# Patient Record
Sex: FEMALE | Race: BLACK OR AFRICAN AMERICAN | NOT HISPANIC OR LATINO | Employment: OTHER | URBAN - METROPOLITAN AREA
[De-identification: names, ages, dates, MRNs, and addresses within clinical notes are randomized per-mention and may not be internally consistent; named-entity substitution may affect disease eponyms.]

---

## 2019-03-01 ENCOUNTER — TELEPHONE (OUTPATIENT)
Dept: FAMILY MEDICINE | Facility: CLINIC | Age: 49
End: 2019-03-01

## 2019-03-01 DIAGNOSIS — F51.01 PRIMARY INSOMNIA: Primary | ICD-10-CM

## 2019-03-03 RX ORDER — ZALEPLON 5 MG/1
5 CAPSULE ORAL NIGHTLY PRN
Qty: 30 CAPSULE | Refills: 2 | Status: SHIPPED | OUTPATIENT
Start: 2019-03-03 | End: 2019-06-04 | Stop reason: SDUPTHER

## 2019-03-04 NOTE — TELEPHONE ENCOUNTER
Patient requesting to change her sleeping med to zaleplon due to her insurance will no longer pay for zolpidem.

## 2019-03-11 RX ORDER — CARVEDILOL 25 MG/1
25 TABLET ORAL
COMMUNITY
End: 2019-10-24 | Stop reason: SDUPTHER

## 2019-03-11 RX ORDER — CYCLOBENZAPRINE HCL 10 MG
10 TABLET ORAL
COMMUNITY
Start: 2019-02-27 | End: 2019-07-11 | Stop reason: CLARIF

## 2019-03-11 RX ORDER — AMLODIPINE BESYLATE 10 MG/1
10 TABLET ORAL
COMMUNITY
End: 2019-07-11 | Stop reason: CLARIF

## 2019-03-11 RX ORDER — LUBIPROSTONE 24 UG/1
CAPSULE ORAL
COMMUNITY
End: 2019-03-11

## 2019-03-12 ENCOUNTER — OFFICE VISIT (OUTPATIENT)
Dept: FAMILY MEDICINE | Facility: CLINIC | Age: 49
End: 2019-03-12
Payer: MEDICARE

## 2019-03-12 VITALS
SYSTOLIC BLOOD PRESSURE: 138 MMHG | WEIGHT: 248.63 LBS | BODY MASS INDEX: 37.68 KG/M2 | HEART RATE: 96 BPM | DIASTOLIC BLOOD PRESSURE: 98 MMHG | TEMPERATURE: 97 F | HEIGHT: 68 IN | OXYGEN SATURATION: 98 %

## 2019-03-12 DIAGNOSIS — M94.0 COSTOCHONDRITIS, ACUTE: ICD-10-CM

## 2019-03-12 DIAGNOSIS — E66.01 CLASS 3 SEVERE OBESITY DUE TO EXCESS CALORIES WITHOUT SERIOUS COMORBIDITY WITH BODY MASS INDEX (BMI) OF 40.0 TO 44.9 IN ADULT: ICD-10-CM

## 2019-03-12 DIAGNOSIS — F51.01 PRIMARY INSOMNIA: ICD-10-CM

## 2019-03-12 DIAGNOSIS — I10 ESSENTIAL HYPERTENSION: Primary | ICD-10-CM

## 2019-03-12 PROBLEM — K21.9 GASTROESOPHAGEAL REFLUX DISEASE WITHOUT ESOPHAGITIS: Status: ACTIVE | Noted: 2019-03-12

## 2019-03-12 PROBLEM — E66.813 CLASS 3 SEVERE OBESITY DUE TO EXCESS CALORIES WITHOUT SERIOUS COMORBIDITY WITH BODY MASS INDEX (BMI) OF 40.0 TO 44.9 IN ADULT: Status: ACTIVE | Noted: 2019-03-12

## 2019-03-12 PROBLEM — K59.04 CHRONIC IDIOPATHIC CONSTIPATION: Status: ACTIVE | Noted: 2019-03-12

## 2019-03-12 PROCEDURE — 99213 PR OFFICE/OUTPT VISIT, EST, LEVL III, 20-29 MIN: ICD-10-PCS | Mod: S$GLB,,, | Performed by: FAMILY MEDICINE

## 2019-03-12 PROCEDURE — 99213 OFFICE O/P EST LOW 20 MIN: CPT | Mod: S$GLB,,, | Performed by: FAMILY MEDICINE

## 2019-03-12 RX ORDER — NAPROXEN 500 MG/1
500 TABLET ORAL 2 TIMES DAILY
Qty: 60 TABLET | Refills: 2 | Status: SHIPPED | OUTPATIENT
Start: 2019-03-12 | End: 2019-07-11 | Stop reason: CLARIF

## 2019-03-12 RX ORDER — ZOLPIDEM TARTRATE 10 MG/1
TABLET ORAL
Qty: 30 TABLET | Refills: 2 | Status: SHIPPED | OUTPATIENT
Start: 2019-03-12 | End: 2019-06-04 | Stop reason: CLARIF

## 2019-03-12 RX ORDER — ZOLPIDEM TARTRATE 10 MG/1
5 TABLET ORAL NIGHTLY PRN
COMMUNITY
End: 2019-03-12 | Stop reason: SDUPTHER

## 2019-03-12 NOTE — PROGRESS NOTES
Subjective:       Patient ID: Nancy Sun is a 48 y.o. female.    Chief Complaint: No chief complaint on file.    49 yo female in for follow up.  She states that she has some pain that is her back that is radiating to the front of her chest.        Review of Systems   Constitutional: Negative for fever.   HENT: Negative for ear pain, postnasal drip, rhinorrhea, sinus pain and sore throat.    Eyes: Negative for redness.   Respiratory: Negative for cough, chest tightness, shortness of breath and wheezing.    Cardiovascular: Negative for chest pain, palpitations and leg swelling.   Gastrointestinal: Negative for constipation, diarrhea, nausea and vomiting.   Genitourinary: Negative for difficulty urinating and dysuria.   Musculoskeletal: Negative for arthralgias.   Skin: Negative for rash.   Neurological: Negative for dizziness.       Objective:      Physical Exam   Constitutional: She is oriented to person, place, and time. She appears well-developed and well-nourished.   HENT:   Head: Normocephalic and atraumatic.   Right Ear: Hearing, tympanic membrane and ear canal normal.   Left Ear: Hearing, tympanic membrane and ear canal normal.   Nose: Nose normal.   Mouth/Throat: Oropharynx is clear and moist.   Eyes: Conjunctivae, EOM and lids are normal. Pupils are equal, round, and reactive to light.   Neck: Normal range of motion. Neck supple.   Cardiovascular: Normal rate, regular rhythm and normal heart sounds.   Pulmonary/Chest: Effort normal and breath sounds normal. She has no wheezes. She has no rales.   Abdominal: Soft. Bowel sounds are normal. She exhibits no distension and no mass. There is no tenderness. There is no guarding.   Musculoskeletal: Normal range of motion. She exhibits no edema or tenderness.   Neurological: She is alert and oriented to person, place, and time. No cranial nerve deficit.   Skin: Skin is warm and dry. No rash noted. No erythema.   Psychiatric: She has a normal mood and affect. Her  behavior is normal.       Assessment:       1. Essential hypertension    2. Costochondritis, acute    3. Primary insomnia    4. Class 3 severe obesity due to excess calories without serious comorbidity with body mass index (BMI) of 40.0 to 44.9 in adult        Plan:     I will try her on naproxen since she takes Aleve OTC and she is allergic to a lot of medications. She will continue the other meds as she is taking them. She will have some labs ordered as well.  .

## 2019-03-14 ENCOUNTER — TELEPHONE (OUTPATIENT)
Dept: FAMILY MEDICINE | Facility: CLINIC | Age: 49
End: 2019-03-14

## 2019-03-14 NOTE — TELEPHONE ENCOUNTER
----- Message from Frantz Duong sent at 3/14/2019 10:37 AM CDT -----  PT CALLED ABOUT HER REFERRAL TO SEE A NEUROLOGIST?

## 2019-03-20 NOTE — TELEPHONE ENCOUNTER
Stabbing pain in back. Went to ER on 3/14/19.  Same problems - ER told her issue sounds like a chronic pain and  pt should see neurologist for chronic nerve pain in back and legs.

## 2019-06-04 DIAGNOSIS — F51.01 PRIMARY INSOMNIA: ICD-10-CM

## 2019-06-04 RX ORDER — ZALEPLON 5 MG/1
CAPSULE ORAL
Qty: 30 CAPSULE | Refills: 2 | Status: SHIPPED | OUTPATIENT
Start: 2019-06-04 | End: 2019-06-18 | Stop reason: RX

## 2019-06-18 ENCOUNTER — OFFICE VISIT (OUTPATIENT)
Dept: FAMILY MEDICINE | Facility: CLINIC | Age: 49
End: 2019-06-18
Payer: MEDICARE

## 2019-06-18 VITALS
HEART RATE: 88 BPM | SYSTOLIC BLOOD PRESSURE: 149 MMHG | WEIGHT: 261 LBS | DIASTOLIC BLOOD PRESSURE: 92 MMHG | TEMPERATURE: 98 F | OXYGEN SATURATION: 99 % | BODY MASS INDEX: 39.68 KG/M2

## 2019-06-18 DIAGNOSIS — Z12.31 BREAST CANCER SCREENING BY MAMMOGRAM: ICD-10-CM

## 2019-06-18 DIAGNOSIS — M79.622 AXILLARY PAIN, LEFT: ICD-10-CM

## 2019-06-18 DIAGNOSIS — R60.0 LOCALIZED EDEMA: ICD-10-CM

## 2019-06-18 DIAGNOSIS — F51.01 PRIMARY INSOMNIA: ICD-10-CM

## 2019-06-18 DIAGNOSIS — I10 ESSENTIAL HYPERTENSION: Primary | ICD-10-CM

## 2019-06-18 DIAGNOSIS — E66.01 CLASS 3 SEVERE OBESITY DUE TO EXCESS CALORIES WITHOUT SERIOUS COMORBIDITY WITH BODY MASS INDEX (BMI) OF 40.0 TO 44.9 IN ADULT: ICD-10-CM

## 2019-06-18 PROCEDURE — 99214 PR OFFICE/OUTPT VISIT, EST, LEVL IV, 30-39 MIN: ICD-10-PCS | Mod: S$GLB,,, | Performed by: FAMILY MEDICINE

## 2019-06-18 PROCEDURE — 99214 OFFICE O/P EST MOD 30 MIN: CPT | Mod: S$GLB,,, | Performed by: FAMILY MEDICINE

## 2019-06-18 RX ORDER — FUROSEMIDE 20 MG/1
20 TABLET ORAL DAILY
Qty: 30 TABLET | Refills: 5 | Status: SHIPPED | OUTPATIENT
Start: 2019-06-18 | End: 2019-07-11 | Stop reason: ALTCHOICE

## 2019-06-18 RX ORDER — TRAZODONE HYDROCHLORIDE 150 MG/1
150 TABLET ORAL NIGHTLY
Qty: 30 TABLET | Refills: 11 | Status: SHIPPED | OUTPATIENT
Start: 2019-06-18 | End: 2019-07-11 | Stop reason: CLARIF

## 2019-06-18 NOTE — PROGRESS NOTES
Subjective:       Patient ID: Nancy Sun is a 48 y.o. female.    Chief Complaint: Follow-up    55 yo female in for follow up.  She needs a refill of medication for her blood pressure.  She complains of left axillary pain.  She needs and order for an annual screening mammogram.  She has RLE edema.  She had a right knee surgery and tried to work a job staying on her feet for several hours a day and it caused her foot and leg to swell.  She also has been taking zolpidem for insomnia and her insurance does not cover it so she wans to try a different med for sleep.    Review of Systems   Constitutional: Negative for fever.   HENT: Negative for ear pain, postnasal drip, rhinorrhea, sinus pain and sore throat.    Eyes: Negative for redness.   Respiratory: Negative for cough, chest tightness, shortness of breath and wheezing.    Cardiovascular: Positive for leg swelling. Negative for chest pain and palpitations.   Gastrointestinal: Negative for constipation, diarrhea, nausea and vomiting.   Genitourinary: Negative for difficulty urinating and dysuria.   Musculoskeletal: Negative for arthralgias.   Skin: Negative for rash.   Neurological: Negative for dizziness.   Psychiatric/Behavioral: Positive for sleep disturbance.       Objective:      Physical Exam   Constitutional: She is oriented to person, place, and time. She appears well-developed and well-nourished.   HENT:   Head: Normocephalic and atraumatic.   Eyes: Pupils are equal, round, and reactive to light. Conjunctivae and EOM are normal.   Neck: Normal range of motion. Neck supple.   Cardiovascular: Normal rate, regular rhythm and normal heart sounds.   Pulmonary/Chest: Effort normal and breath sounds normal. She has no wheezes. She has no rales.   Abdominal: Soft. Bowel sounds are normal. She exhibits no distension and no mass. There is no tenderness. There is no guarding.   Musculoskeletal: Normal range of motion. She exhibits no edema or tenderness.    Lymphadenopathy:     She has no axillary adenopathy.   Neurological: She is alert and oriented to person, place, and time. No cranial nerve deficit.   Skin: Skin is warm and dry. No rash noted. No erythema.   Psychiatric: She has a normal mood and affect. Her behavior is normal.   Nursing note and vitals reviewed.      Assessment:       1. Essential hypertension    2. Localized edema    3. Axillary pain, left    4. Primary insomnia    5. Class 3 severe obesity due to excess calories without serious comorbidity with body mass index (BMI) of 40.0 to 44.9 in adult        Plan:     Hypertension in chronic and not well controlled but not taking meds daily. I will refill the cqrvedilol and add lasix for edema.  Screening mammogram ordered.  Trazodone 150 mg qhs for insomnia.  Weight loss strongly encouraged through diet and exercise.

## 2019-07-11 ENCOUNTER — OFFICE VISIT (OUTPATIENT)
Dept: FAMILY MEDICINE | Facility: CLINIC | Age: 49
End: 2019-07-11
Payer: MEDICARE

## 2019-07-11 VITALS
DIASTOLIC BLOOD PRESSURE: 81 MMHG | SYSTOLIC BLOOD PRESSURE: 145 MMHG | WEIGHT: 262 LBS | BODY MASS INDEX: 39.71 KG/M2 | TEMPERATURE: 98 F | HEIGHT: 68 IN | HEART RATE: 70 BPM | OXYGEN SATURATION: 99 %

## 2019-07-11 DIAGNOSIS — F51.01 PRIMARY INSOMNIA: ICD-10-CM

## 2019-07-11 DIAGNOSIS — I10 ESSENTIAL HYPERTENSION: ICD-10-CM

## 2019-07-11 DIAGNOSIS — R60.0 EDEMA OF BOTH LOWER EXTREMITIES: ICD-10-CM

## 2019-07-11 DIAGNOSIS — M25.461 EFFUSION OF BURSA OF RIGHT KNEE: ICD-10-CM

## 2019-07-11 LAB
CRP QUANTITATIVE: 1 MG/DL (ref 0–0.9)
ERYTHROCYTE [SEDIMENTATION RATE] IN BLOOD: 25 MM/HR (ref 0–20)

## 2019-07-11 PROCEDURE — 99213 PR OFFICE/OUTPT VISIT, EST, LEVL III, 20-29 MIN: ICD-10-PCS | Mod: S$GLB,,, | Performed by: FAMILY MEDICINE

## 2019-07-11 PROCEDURE — 99213 OFFICE O/P EST LOW 20 MIN: CPT | Mod: S$GLB,,, | Performed by: FAMILY MEDICINE

## 2019-07-11 RX ORDER — TORSEMIDE 20 MG/1
20 TABLET ORAL DAILY
Qty: 30 TABLET | Refills: 11 | Status: SHIPPED | OUTPATIENT
Start: 2019-07-11 | End: 2020-04-23

## 2019-07-11 RX ORDER — ZOLPIDEM TARTRATE 10 MG/1
10 TABLET ORAL NIGHTLY
Qty: 30 TABLET | Refills: 2 | Status: SHIPPED | OUTPATIENT
Start: 2019-07-11 | End: 2019-12-18

## 2019-07-11 NOTE — PROGRESS NOTES
Subjective:       Patient ID: Nancy Sun is a 49 y.o. female.    Chief Complaint: Leg Swelling    48 yo female with complaint of bilateral lower extremity edema.  She states that the swelling is worse on the right side and has been occurring more over the past 3 months.  She states that she     Review of Systems   Constitutional: Negative for fever.   HENT: Negative for ear pain, postnasal drip, rhinorrhea, sinus pain and sore throat.    Eyes: Negative for redness.   Respiratory: Negative for cough, chest tightness, shortness of breath and wheezing.    Cardiovascular: Negative for chest pain, palpitations and leg swelling.   Gastrointestinal: Negative for constipation, diarrhea, nausea and vomiting.   Genitourinary: Negative for difficulty urinating and dysuria.   Musculoskeletal: Negative for arthralgias.   Skin: Negative for rash.   Neurological: Negative for dizziness.       Objective:      Physical Exam   Constitutional: She is oriented to person, place, and time. She appears well-developed and well-nourished.   HENT:   Head: Normocephalic and atraumatic.   Eyes: Pupils are equal, round, and reactive to light. Conjunctivae and EOM are normal.   Neck: Normal range of motion. Neck supple.   Cardiovascular: Normal rate, regular rhythm and normal heart sounds.   Pulmonary/Chest: Breath sounds normal. She has no wheezes. She has no rales.   Abdominal: Soft. Bowel sounds are normal. She exhibits no distension and no mass. There is no tenderness. There is no guarding.   Musculoskeletal: Normal range of motion. She exhibits edema (tenderness of both lower extremities) and tenderness.   Neurological: She is alert and oriented to person, place, and time. No cranial nerve deficit.   Skin: Skin is warm and dry. No rash noted. No erythema.   Psychiatric: She has a normal mood and affect. Her behavior is normal.   Nursing note and vitals reviewed.      Assessment:       1. Edema of both lower extremities    2. Essential  hypertension    3. Primary insomnia    4. Effusion of bursa of right knee        Plan:     Referral to vein specialist for further evaluation.  Torsemide 20 mg daily and d/c the furosemide.  Continue the carvedilol for now.  Check the ESR and CRP to eval for right knee infection.

## 2019-07-12 ENCOUNTER — TELEPHONE (OUTPATIENT)
Dept: FAMILY MEDICINE | Facility: CLINIC | Age: 49
End: 2019-07-12

## 2019-07-25 ENCOUNTER — OFFICE VISIT (OUTPATIENT)
Dept: FAMILY MEDICINE | Facility: CLINIC | Age: 49
End: 2019-07-25
Payer: MEDICARE

## 2019-07-25 VITALS
DIASTOLIC BLOOD PRESSURE: 90 MMHG | OXYGEN SATURATION: 99 % | WEIGHT: 258.5 LBS | TEMPERATURE: 98 F | SYSTOLIC BLOOD PRESSURE: 139 MMHG | HEART RATE: 82 BPM | HEIGHT: 68 IN | BODY MASS INDEX: 39.18 KG/M2

## 2019-07-25 DIAGNOSIS — L73.2 HIDRADENITIS SUPPURATIVA OF LEFT AXILLA: Primary | ICD-10-CM

## 2019-07-25 PROCEDURE — 99213 PR OFFICE/OUTPT VISIT, EST, LEVL III, 20-29 MIN: ICD-10-PCS | Mod: 25,S$GLB,, | Performed by: FAMILY MEDICINE

## 2019-07-25 PROCEDURE — 10060 PR DRAIN SKIN ABSCESS SIMPLE: ICD-10-PCS | Mod: S$GLB,,, | Performed by: FAMILY MEDICINE

## 2019-07-25 PROCEDURE — 10060 I&D ABSCESS SIMPLE/SINGLE: CPT | Mod: S$GLB,,, | Performed by: FAMILY MEDICINE

## 2019-07-25 PROCEDURE — 99213 OFFICE O/P EST LOW 20 MIN: CPT | Mod: 25,S$GLB,, | Performed by: FAMILY MEDICINE

## 2019-07-25 RX ORDER — ALBUTEROL SULFATE 90 UG/1
AEROSOL, METERED RESPIRATORY (INHALATION)
Refills: 0 | COMMUNITY
Start: 2019-07-21 | End: 2020-05-29 | Stop reason: SDUPTHER

## 2019-07-25 RX ORDER — FLUTICASONE PROPIONATE 50 MCG
SPRAY, SUSPENSION (ML) NASAL
Refills: 0 | COMMUNITY
Start: 2019-07-21 | End: 2020-04-23

## 2019-07-25 RX ORDER — AMOXICILLIN AND CLAVULANATE POTASSIUM 875; 125 MG/1; MG/1
1 TABLET, FILM COATED ORAL EVERY 12 HOURS
Qty: 20 TABLET | Refills: 0 | Status: SHIPPED | OUTPATIENT
Start: 2019-07-25 | End: 2020-04-23

## 2019-07-25 RX ORDER — MOMETASONE FUROATE 50 UG/1
SPRAY, METERED NASAL
COMMUNITY
End: 2020-05-29

## 2019-07-25 RX ORDER — PROMETHAZINE HYDROCHLORIDE AND DEXTROMETHORPHAN HYDROBROMIDE 6.25; 15 MG/5ML; MG/5ML
5 SYRUP ORAL
COMMUNITY
Start: 2019-07-19 | End: 2020-04-23

## 2019-07-26 ENCOUNTER — OFFICE VISIT (OUTPATIENT)
Dept: FAMILY MEDICINE | Facility: CLINIC | Age: 49
End: 2019-07-26
Payer: MEDICARE

## 2019-07-26 VITALS
DIASTOLIC BLOOD PRESSURE: 95 MMHG | TEMPERATURE: 98 F | WEIGHT: 258.5 LBS | HEART RATE: 72 BPM | HEIGHT: 68 IN | BODY MASS INDEX: 39.18 KG/M2 | SYSTOLIC BLOOD PRESSURE: 152 MMHG | OXYGEN SATURATION: 99 %

## 2019-07-26 DIAGNOSIS — Z98.890 STATUS POST INCISION AND DRAINAGE: Primary | ICD-10-CM

## 2019-07-26 DIAGNOSIS — L73.2 HIDRADENITIS SUPPURATIVA OF LEFT AXILLA: ICD-10-CM

## 2019-07-26 PROCEDURE — 10060 I&D ABSCESS SIMPLE/SINGLE: CPT | Mod: 58,S$GLB,, | Performed by: NURSE PRACTITIONER

## 2019-07-26 PROCEDURE — 10060 PR DRAIN SKIN ABSCESS SIMPLE: ICD-10-PCS | Mod: 58,S$GLB,, | Performed by: NURSE PRACTITIONER

## 2019-07-26 PROCEDURE — 99212 OFFICE O/P EST SF 10 MIN: CPT | Mod: 25,S$GLB,, | Performed by: NURSE PRACTITIONER

## 2019-07-26 PROCEDURE — 99212 PR OFFICE/OUTPT VISIT, EST, LEVL II, 10-19 MIN: ICD-10-PCS | Mod: 25,S$GLB,, | Performed by: NURSE PRACTITIONER

## 2019-07-26 NOTE — PROGRESS NOTES
Subjective:       Patient ID: Nancy Sun is a 49 y.o. female.    Chief Complaint: Follow-up    49-year-old female who presents with a painful swollen area to the left axilla.  She reports that it started swelling over the past couple of days and now is extremely painful.  She states that hurts when she applies deodorant.  She has not had any fever.    Review of Systems   Constitutional: Negative for fever.   HENT: Negative for ear pain, postnasal drip, rhinorrhea, sinus pain and sore throat.    Eyes: Negative for redness.   Respiratory: Negative for cough, chest tightness, shortness of breath and wheezing.    Cardiovascular: Negative for chest pain, palpitations and leg swelling.   Gastrointestinal: Negative for constipation, diarrhea, nausea and vomiting.   Genitourinary: Negative for difficulty urinating and dysuria.   Musculoskeletal: Negative for arthralgias.   Skin: Negative for rash.        Painful swollen nodule to left axilla   Neurological: Negative for dizziness.       Objective:      Physical Exam   Constitutional: She appears well-developed and well-nourished.   Cardiovascular: Normal rate, regular rhythm and normal heart sounds.   Pulmonary/Chest: Breath sounds normal. She has no wheezes. She has no rales.   Tender painful 3 cm by 1 cm nodule to the left axilla.       Abdominal: She exhibits no distension and no mass. There is no tenderness. There is no guarding.   Musculoskeletal: She exhibits no edema or tenderness.   Neurological: No cranial nerve deficit.   Skin: Skin is warm and dry. No rash noted. No erythema.   Psychiatric: She has a normal mood and affect. Her behavior is normal.   Vitals reviewed.      Assessment:       1. Hidradenitis suppurativa of left axilla        Plan:     incision and drainage of abscess performed.  Patient was trip in sterile fashion any and area cleaned with Betadine swabs x3.  Skin was anesthetized with 3 cc of 1% lidocaine with epinephrine.  It was then incised  with a 15.  Blade.  A small amount of purulent drainage was expressed and mostly blood.  The open area was explored with sterile Q-tip an open wound was packed with sterile iodoform gauze.  It was then covered with sterile bandage.  Patient tolerated procedure well and estimated blood loss was less than 10 cc. Patient was given instruction to return tomorrow for wound check.  She was given Augmentin 875 mg to take twice daily for infection.  She is allergic to most pain medications so she was not prescribed any medication for pain at this time.

## 2019-07-26 NOTE — PROGRESS NOTES
Clinic Note  7/26/2019      Subjective:       Patient ID:  NANCY is a 49 y.o. female being seen for an established visit.    Chief Complaint: Follow-up    HPI  Nancy is 49-year-old female in clinic today status post incision and drainage to the left axilla per Dr. Canales on 07/25/19. Reports she is here to have wound repacked and dressing changed. States that left axilla is painful and tender to touch. States she tried OTC tylenol for pain/discomfort without relief. Denies fever or chills.        Review of Systems   Constitutional: Negative for chills and fever.   Skin:        Wound to left axilla, I&D performed on 0725/19. Denies bleeding or excessive drainage.        Medication List with Changes/Refills   Current Medications    AMOXICILLIN-CLAVULANATE 875-125MG (AUGMENTIN) 875-125 MG PER TABLET    Take 1 tablet by mouth every 12 (twelve) hours.    CARVEDILOL (COREG) 25 MG TABLET    25 mg.    FLUTICASONE PROPIONATE (FLONASE) 50 MCG/ACTUATION NASAL SPRAY    USE 2 SPRAYS NASALLY QD    MOMETASONE (NASONEX) 50 MCG/ACTUATION NASAL SPRAY    Nasonex 50 mcg/actuation Spray   Spray 2 sprays every day by intranasal route.    PROAIR HFA 90 MCG/ACTUATION INHALER    USE 1 PUFF PO BID    PROMETHAZINE-DEXTROMETHORPHAN (PROMETHAZINE-DM) 6.25-15 MG/5 ML SYRP    5 mLs.    TORSEMIDE (DEMADEX) 20 MG TAB    Take 1 tablet (20 mg total) by mouth once daily.    UMECLIDINIUM-VILANTEROL (ANORO ELLIPTA) 62.5-25 MCG/ACTUATION DSDV    Inhale into the lungs. Controller    ZOLPIDEM (AMBIEN) 10 MG TAB    Take 1 tablet (10 mg total) by mouth every evening.       Patient Active Problem List   Diagnosis    Essential hypertension    Primary insomnia    Gastroesophageal reflux disease without esophagitis    Chronic idiopathic constipation    Class 3 severe obesity due to excess calories without serious comorbidity with body mass index (BMI) of 40.0 to 44.9 in adult    Edema of both lower extremities    Effusion of bursa of right knee  "   Hidradenitis suppurativa of left axilla           Objective:      BP (!) 152/95 (BP Location: Left arm, Patient Position: Sitting, BP Method: Large (Automatic))   Pulse 72   Temp 97.8 °F (36.6 °C)   Ht 5' 8" (1.727 m)   Wt 117.3 kg (258 lb 8 oz)   SpO2 99%   BMI 39.30 kg/m²   Estimated body mass index is 39.3 kg/m² as calculated from the following:    Height as of this encounter: 5' 8" (1.727 m).    Weight as of this encounter: 117.3 kg (258 lb 8 oz).  Physical Exam   Constitutional: She is oriented to person, place, and time and well-developed, well-nourished, and in no distress. No distress.   Pulmonary/Chest: Effort normal.   Neurological: She is alert and oriented to person, place, and time. Gait normal. GCS score is 15.   Skin: Skin is warm and dry. She is not diaphoretic.   1 cm incision to left axilla, status post I&D performed 07/25/19 in clinic. Dressing and packing removed, minimal serosanguinous drainage.   Psychiatric: Mood, memory, affect and judgment normal.   Nursing note and vitals reviewed.             Procedure:  Previous dressing and packing removed intact. Expressed minimal purulent, serosanguinous drainage from site using manual pressure.    The wound was then packed with 1/4" Iodoform packing, placing a gauze dressing over the wound and securing with tegaderm. The patient tolerated the procedure well.      Assessment and Plan:   Status pos Incision and Drainage of left axilla    Continue wound care.   Take antibiotics as prescribed.  Keep dressing dry and intact  Follow up on Monday 07/29/19 to have wound reevaluated      Problem List Items Addressed This Visit        Derm    Hidradenitis suppurativa of left axilla      Other Visit Diagnoses     Status post incision and drainage    -  Primary          Follow Up:   No follow-ups on file.    Other Orders Placed This Visit:  No orders of the defined types were placed in this encounter.        Tere Marquis    Problem List Items " Addressed This Visit        Derm    Hidradenitis suppurativa of left axilla      Other Visit Diagnoses     Status post incision and drainage    -  Primary

## 2019-07-29 ENCOUNTER — OFFICE VISIT (OUTPATIENT)
Dept: FAMILY MEDICINE | Facility: CLINIC | Age: 49
End: 2019-07-29
Payer: MEDICARE

## 2019-07-29 VITALS
SYSTOLIC BLOOD PRESSURE: 151 MMHG | OXYGEN SATURATION: 100 % | HEART RATE: 67 BPM | BODY MASS INDEX: 39.25 KG/M2 | HEIGHT: 68 IN | WEIGHT: 259 LBS | TEMPERATURE: 99 F | DIASTOLIC BLOOD PRESSURE: 98 MMHG

## 2019-07-29 DIAGNOSIS — L73.2 HIDRADENITIS SUPPURATIVA OF LEFT AXILLA: ICD-10-CM

## 2019-07-29 DIAGNOSIS — Z98.890 STATUS POST INCISION AND DRAINAGE: Primary | ICD-10-CM

## 2019-07-29 PROCEDURE — 99024 POSTOP FOLLOW-UP VISIT: CPT | Mod: S$GLB,,, | Performed by: NURSE PRACTITIONER

## 2019-07-29 PROCEDURE — 99024 PR POST-OP FOLLOW-UP VISIT: ICD-10-PCS | Mod: S$GLB,,, | Performed by: NURSE PRACTITIONER

## 2019-07-29 NOTE — PROGRESS NOTES
Clinic Note  7/29/2019      Subjective:       Patient ID:  NANCY is a 49 y.o. female being seen for an established visit.    Chief Complaint: Follow-up    HPI  Nancy is 49-year-old female in clinic today status post incision and drainage to the left axilla per Dr. Canales on 07/25/19. Reports she is here to have wound evaluated. States that left axilla is painful and tender to touch. Reports taking OTC Tylenlol and warm compresses to area for pain/discomfort without relief. Denies fever or chills.    ROS  Review of Systems   Constitutional: Negative for chills and fever.   Skin:        Wound to left axilla, I&D performed on 07/25/19. Denies bleeding or excessive drainage.  Reports tenderness to left axilla.     Medication List with Changes/Refills   Current Medications    AMOXICILLIN-CLAVULANATE 875-125MG (AUGMENTIN) 875-125 MG PER TABLET    Take 1 tablet by mouth every 12 (twelve) hours.    CARVEDILOL (COREG) 25 MG TABLET    25 mg.    FLUTICASONE PROPIONATE (FLONASE) 50 MCG/ACTUATION NASAL SPRAY    USE 2 SPRAYS NASALLY QD    MOMETASONE (NASONEX) 50 MCG/ACTUATION NASAL SPRAY    Nasonex 50 mcg/actuation Spray   Spray 2 sprays every day by intranasal route.    PROAIR HFA 90 MCG/ACTUATION INHALER    USE 1 PUFF PO BID    PROMETHAZINE-DEXTROMETHORPHAN (PROMETHAZINE-DM) 6.25-15 MG/5 ML SYRP    5 mLs.    TORSEMIDE (DEMADEX) 20 MG TAB    Take 1 tablet (20 mg total) by mouth once daily.    UMECLIDINIUM-VILANTEROL (ANORO ELLIPTA) 62.5-25 MCG/ACTUATION DSDV    Inhale into the lungs. Controller    ZOLPIDEM (AMBIEN) 10 MG TAB    Take 1 tablet (10 mg total) by mouth every evening.       Patient Active Problem List   Diagnosis    Essential hypertension    Primary insomnia    Gastroesophageal reflux disease without esophagitis    Chronic idiopathic constipation    Class 3 severe obesity due to excess calories without serious comorbidity with body mass index (BMI) of 40.0 to 44.9 in adult    Edema of both lower  "extremities    Effusion of bursa of right knee    Hidradenitis suppurativa of left axilla           Objective:      BP (!) 151/98 (BP Location: Right leg, Patient Position: Sitting, BP Method: Large (Automatic))   Pulse 67   Temp 98.5 °F (36.9 °C)   Ht 5' 8" (1.727 m)   Wt 117.5 kg (259 lb)   SpO2 100%   BMI 39.38 kg/m²   Estimated body mass index is 39.38 kg/m² as calculated from the following:    Height as of this encounter: 5' 8" (1.727 m).    Weight as of this encounter: 117.5 kg (259 lb).  Physical Exam   Constitutional: She is well-developed, well-nourished, and in no distress. No distress.   HENT:   Head: Normocephalic and atraumatic.   Right Ear: External ear normal.   Left Ear: External ear normal.   Eyes: Conjunctivae are normal. Right eye exhibits no discharge. Left eye exhibits no discharge. No scleral icterus.   Neck: Normal range of motion. Neck supple. No JVD present. No tracheal deviation present.   Cardiovascular: Normal rate, regular rhythm and normal heart sounds.   No murmur heard.  Pulmonary/Chest: Effort normal and breath sounds normal. No respiratory distress. She has no wheezes. She has no rales.   Skin: Skin is warm. No rash noted. She is not diaphoretic. No erythema.   1 cm incision to left axilla, status post I&D performed 07/25/19 in clinic per Dr. Canales. Dressing and packing removed, minimal serosanguinous drainage.    Psychiatric: Mood, memory, affect and judgment normal.      Procedure:  Previous dressing and packing removed intact. Expressed minimal serosanguinous drainage from site using manual pressure.    The wound was cleaned,  placing a gauze dressing over the wound and securing with tegaderm. The patient tolerated the procedure well.      Assessment and Plan:   Status post incision and drainage in clinic on 07/25/19    Continue wound care.   Take antibiotics as prescribed.  Keep dressing dry and intact  Referral to Dr. KAMERON Gonsalez, General Surgeon  Follow up with Dr." Parker as scheduled and as needed.      Problem List Items Addressed This Visit        Derm    Hidradenitis suppurativa of left axilla    Relevant Orders    Ambulatory referral to General Surgery      Other Visit Diagnoses     Status post incision and drainage    -  Primary          Follow Up:   No follow-ups on file.    Other Orders Placed This Visit:  Orders Placed This Encounter   Procedures    Ambulatory referral to General Surgery         Tere Marquis    Problem List Items Addressed This Visit        Derm    Hidradenitis suppurativa of left axilla    Relevant Orders    Ambulatory referral to General Surgery      Other Visit Diagnoses     Status post incision and drainage    -  Primary

## 2019-07-30 ENCOUNTER — TELEPHONE (OUTPATIENT)
Dept: FAMILY MEDICINE | Facility: CLINIC | Age: 49
End: 2019-07-30

## 2019-07-30 DIAGNOSIS — L73.2 HIDRADENITIS SUPPURATIVA OF LEFT AXILLA: Primary | ICD-10-CM

## 2019-07-30 RX ORDER — LIDOCAINE HYDROCHLORIDE 20 MG/ML
JELLY TOPICAL
Qty: 53 ML | Refills: 0 | Status: SHIPPED | OUTPATIENT
Start: 2019-07-30 | End: 2019-07-31 | Stop reason: SDUPTHER

## 2019-07-30 RX ORDER — CELECOXIB 200 MG/1
200 CAPSULE ORAL 2 TIMES DAILY
Qty: 30 CAPSULE | Refills: 2 | Status: SHIPPED | OUTPATIENT
Start: 2019-07-30 | End: 2020-05-29

## 2019-07-30 NOTE — TELEPHONE ENCOUNTER
Il send a prescription to the pharmacy for her to take in some topical anesthetic gel for her to apply.

## 2019-07-30 NOTE — TELEPHONE ENCOUNTER
----- Message from Tisha Feliciano sent at 7/30/2019 10:40 AM CDT -----  Pt is asking if she can get pain medication called out for the procedure that was done on her arm.

## 2019-07-31 DIAGNOSIS — L73.2 HIDRADENITIS SUPPURATIVA OF LEFT AXILLA: ICD-10-CM

## 2019-07-31 RX ORDER — LIDOCAINE HYDROCHLORIDE 20 MG/ML
JELLY TOPICAL
Qty: 53 ML | Refills: 0 | Status: SHIPPED | OUTPATIENT
Start: 2019-07-31 | End: 2020-04-23

## 2019-10-07 ENCOUNTER — OFFICE VISIT (OUTPATIENT)
Dept: FAMILY MEDICINE | Facility: CLINIC | Age: 49
End: 2019-10-07
Payer: MEDICARE

## 2019-10-07 VITALS
TEMPERATURE: 98 F | OXYGEN SATURATION: 99 % | WEIGHT: 254.38 LBS | BODY MASS INDEX: 38.55 KG/M2 | SYSTOLIC BLOOD PRESSURE: 150 MMHG | HEIGHT: 68 IN | DIASTOLIC BLOOD PRESSURE: 89 MMHG | HEART RATE: 78 BPM

## 2019-10-07 DIAGNOSIS — E66.01 CLASS 3 SEVERE OBESITY DUE TO EXCESS CALORIES WITHOUT SERIOUS COMORBIDITY WITH BODY MASS INDEX (BMI) OF 40.0 TO 44.9 IN ADULT: ICD-10-CM

## 2019-10-07 DIAGNOSIS — I10 ESSENTIAL HYPERTENSION: ICD-10-CM

## 2019-10-07 DIAGNOSIS — F51.01 PRIMARY INSOMNIA: ICD-10-CM

## 2019-10-07 PROCEDURE — 99213 OFFICE O/P EST LOW 20 MIN: CPT | Mod: S$GLB,,, | Performed by: FAMILY MEDICINE

## 2019-10-07 PROCEDURE — 99213 PR OFFICE/OUTPT VISIT, EST, LEVL III, 20-29 MIN: ICD-10-PCS | Mod: S$GLB,,, | Performed by: FAMILY MEDICINE

## 2019-10-07 RX ORDER — ZOLPIDEM TARTRATE 10 MG/1
10 TABLET ORAL NIGHTLY
Qty: 30 TABLET | Refills: 3 | Status: SHIPPED | OUTPATIENT
Start: 2019-10-07 | End: 2019-12-17 | Stop reason: SDUPTHER

## 2019-10-07 NOTE — PROGRESS NOTES
Subjective:      Patient ID: Nancy Sun is a 49 y.o. female.    Chief Complaint: No chief complaint on file.    Hypertension: Patient here for follow-up of elevated blood pressure. She is not exercising and is not adherent to low salt diet.  Blood pressure is not well controlled at home. Cardiac symptoms none. Patient denies chest pain, dyspnea and palpitations.  Cardiovascular risk factors: hypertension, obesity (BMI >= 30 kg/m2) and sedentary lifestyle. Use of agents associated with hypertension: none. History of target organ damage: none    She also need a refill of zolpidem that she takes for insomnia.  She is still seeing the surgeon for hidradenitis of the left axilla.    Hypertension   This is a chronic problem. The problem is uncontrolled. Pertinent negatives include no anxiety, chest pain, headaches, palpitations or shortness of breath. There are no associated agents to hypertension. Risk factors for coronary artery disease include stress and obesity. Past treatments include beta blockers and diuretics. The current treatment provides moderate improvement. Compliance problems include exercise and diet.      Review of Systems   Constitutional: Negative for fever.   HENT: Negative for ear pain, postnasal drip, rhinorrhea, sinus pain and sore throat.    Eyes: Negative for redness.   Respiratory: Negative for cough, chest tightness, shortness of breath and wheezing.    Cardiovascular: Negative for chest pain, palpitations and leg swelling.   Gastrointestinal: Negative for constipation, diarrhea, nausea and vomiting.   Genitourinary: Negative for difficulty urinating and dysuria.   Musculoskeletal: Negative for arthralgias.   Skin: Negative for rash.   Neurological: Negative for dizziness and headaches.   Psychiatric/Behavioral: Positive for sleep disturbance.     Medication List with Changes/Refills   Current Medications    AMOXICILLIN-CLAVULANATE 875-125MG (AUGMENTIN) 875-125 MG PER TABLET    Take 1  "tablet by mouth every 12 (twelve) hours.    CARVEDILOL (COREG) 25 MG TABLET    25 mg.    CELECOXIB (CELEBREX) 200 MG CAPSULE    Take 1 capsule (200 mg total) by mouth 2 (two) times daily.    FLUTICASONE PROPIONATE (FLONASE) 50 MCG/ACTUATION NASAL SPRAY    USE 2 SPRAYS NASALLY QD    LIDOCAINE HCL 2% (XYLOCAINE) 2 % JELLY    Apply topically as needed. Apply to affected area every 8 hr as needed    MOMETASONE (NASONEX) 50 MCG/ACTUATION NASAL SPRAY    Nasonex 50 mcg/actuation Spray   Spray 2 sprays every day by intranasal route.    PROAIR HFA 90 MCG/ACTUATION INHALER    USE 1 PUFF PO BID    PROMETHAZINE-DEXTROMETHORPHAN (PROMETHAZINE-DM) 6.25-15 MG/5 ML SYRP    5 mLs.    TORSEMIDE (DEMADEX) 20 MG TAB    Take 1 tablet (20 mg total) by mouth once daily.    UMECLIDINIUM-VILANTEROL (ANORO ELLIPTA) 62.5-25 MCG/ACTUATION DSDV    Inhale into the lungs. Controller    ZOLPIDEM (AMBIEN) 10 MG TAB    Take 1 tablet (10 mg total) by mouth every evening.      Objective:   There were no vitals taken for this visit.   Estimated body mass index is 39.38 kg/m² as calculated from the following:    Height as of 7/29/19: 5' 8" (1.727 m).    Weight as of 7/29/19: 117.5 kg (259 lb).   Physical Exam   Constitutional: She is oriented to person, place, and time. She appears well-developed and well-nourished.   HENT:   Head: Normocephalic and atraumatic.   Right Ear: Hearing and tympanic membrane normal.   Left Ear: Hearing and tympanic membrane normal.   Nose: Nose normal.   Mouth/Throat: Uvula is midline, oropharynx is clear and moist and mucous membranes are normal.   Eyes: Pupils are equal, round, and reactive to light. Conjunctivae and EOM are normal.   Neck: Normal range of motion. Neck supple.   Cardiovascular: Normal rate, regular rhythm and normal heart sounds.   Pulmonary/Chest: Breath sounds normal. She has no wheezes. She has no rales.   Abdominal: Soft. Bowel sounds are normal. She exhibits no distension and no mass. There is no " tenderness. There is no guarding.   Musculoskeletal: Normal range of motion. She exhibits no edema or tenderness.   Neurological: She is alert and oriented to person, place, and time. No cranial nerve deficit.   Skin: Skin is warm and dry. No rash noted. No erythema.   Psychiatric: She has a normal mood and affect. Her speech is normal and behavior is normal. Judgment and thought content normal. Cognition and memory are normal.   Nursing note and vitals reviewed.    No results found for: WBC, HGB, HCT, PLT, CHOL, TRIG, HDL, LDLDIRECT, ALT, AST, NA, K, CL, CREATININE, BUN, CO2, TSH, PSA, INR, GLUF, HGBA1C, MICROALBUR   Assessment:      Problem List Items Addressed This Visit        Cardiac/Vascular    Essential hypertension    Current Assessment & Plan       Chronic and under fair control but most have caused side effects so I will have her to monitor her blood pressure regularly to prove if she really needs more medication.         Relevant Medications    zolpidem (AMBIEN) 10 mg Tab       Other    Primary insomnia    Current Assessment & Plan       Refill zolpidem.         Class 3 severe obesity due to excess calories without serious comorbidity with body mass index (BMI) of 40.0 to 44.9 in adult    Current Assessment & Plan       Weight loss is strongly encouraged through diet and exercise.                Plan:     Patient to monitor BP closely and keep log of readings and lose weight through diet and exercise.

## 2019-10-08 NOTE — ASSESSMENT & PLAN NOTE
Chronic and under fair control but most have caused side effects so I will have her to monitor her blood pressure regularly to prove if she really needs more medication.

## 2019-10-09 ENCOUNTER — TELEPHONE (OUTPATIENT)
Dept: FAMILY MEDICINE | Facility: CLINIC | Age: 49
End: 2019-10-09

## 2019-10-09 DIAGNOSIS — I10 ESSENTIAL HYPERTENSION: Primary | ICD-10-CM

## 2019-10-09 RX ORDER — DILTIAZEM HYDROCHLORIDE 120 MG/1
120 CAPSULE, COATED, EXTENDED RELEASE ORAL DAILY
Qty: 30 CAPSULE | Refills: 3 | Status: SHIPPED | OUTPATIENT
Start: 2019-10-09 | End: 2019-11-14 | Stop reason: SDUPTHER

## 2019-10-09 NOTE — TELEPHONE ENCOUNTER
----- Message from Tisha Feliciano sent at 10/9/2019  9:05 AM CDT -----  Pt had an appt this week her BP medication was never sent in to [facility] on [address].

## 2019-10-14 ENCOUNTER — TELEPHONE (OUTPATIENT)
Dept: FAMILY MEDICINE | Facility: CLINIC | Age: 49
End: 2019-10-14

## 2019-10-14 NOTE — TELEPHONE ENCOUNTER
----- Message from Tisha Feliciano sent at 10/14/2019  8:58 AM CDT -----  Pt states she went into er over the weekend her whole right arm in swollen she is asking if she can be scheduled to come into be seen today 10/14.

## 2019-10-24 ENCOUNTER — OFFICE VISIT (OUTPATIENT)
Dept: FAMILY MEDICINE | Facility: CLINIC | Age: 49
End: 2019-10-24
Payer: MEDICARE

## 2019-10-24 VITALS
TEMPERATURE: 98 F | OXYGEN SATURATION: 99 % | WEIGHT: 253.5 LBS | BODY MASS INDEX: 38.42 KG/M2 | HEART RATE: 56 BPM | DIASTOLIC BLOOD PRESSURE: 91 MMHG | SYSTOLIC BLOOD PRESSURE: 151 MMHG | HEIGHT: 68 IN

## 2019-10-24 DIAGNOSIS — E66.01 CLASS 3 SEVERE OBESITY DUE TO EXCESS CALORIES WITHOUT SERIOUS COMORBIDITY WITH BODY MASS INDEX (BMI) OF 40.0 TO 44.9 IN ADULT: ICD-10-CM

## 2019-10-24 DIAGNOSIS — I10 ESSENTIAL HYPERTENSION: Primary | ICD-10-CM

## 2019-10-24 DIAGNOSIS — L73.2 HIDRADENITIS SUPPURATIVA OF LEFT AXILLA: ICD-10-CM

## 2019-10-24 PROCEDURE — 99213 OFFICE O/P EST LOW 20 MIN: CPT | Mod: S$GLB,,, | Performed by: FAMILY MEDICINE

## 2019-10-24 PROCEDURE — 99213 PR OFFICE/OUTPT VISIT, EST, LEVL III, 20-29 MIN: ICD-10-PCS | Mod: S$GLB,,, | Performed by: FAMILY MEDICINE

## 2019-10-24 RX ORDER — CARVEDILOL 25 MG/1
25 TABLET ORAL 2 TIMES DAILY
Qty: 60 TABLET | Refills: 5 | Status: SHIPPED | OUTPATIENT
Start: 2019-10-24 | End: 2019-10-24 | Stop reason: DRUGHIGH

## 2019-10-24 NOTE — PROGRESS NOTES
Subjective:      Patient ID: Nancy Snu is a 49 y.o. female.    Chief Complaint: Follow-up and Abscess    49-year-old female in for follow-up.  Patient has hidradenitis and has been with recurrence swelling of the left forearm and pain to left axilla on and off since surgery.  She went to emergency room recently because of swelling to the left forearm.  She is also follow-up on her blood pressure at the last visit her blood pressure was elevated and she was told to continue to take carvedilol and I added the diltiazem.  She has been taking diltiazem but not the carvedilol and her current blood pressure is still elevated.  She reports the surgeon is having her to see an infectious disease doctor at [facility].    Review of Systems   Constitutional: Negative for fever.   HENT: Negative for ear pain, postnasal drip, rhinorrhea, sinus pain and sore throat.    Eyes: Negative for redness.   Respiratory: Negative for cough, chest tightness, shortness of breath and wheezing.    Cardiovascular: Negative for chest pain, palpitations and leg swelling.   Gastrointestinal: Negative for constipation, diarrhea, nausea and vomiting.   Genitourinary: Negative for difficulty urinating and dysuria.   Musculoskeletal: Positive for arthralgias.        Left forearm and hand swelling   Skin: Positive for wound (Open wound to left axilla still healing but does not appear to be infected at this time). Negative for rash.   Neurological: Negative for dizziness.     Medication List with Changes/Refills   Current Medications    AMOXICILLIN-CLAVULANATE 875-125MG (AUGMENTIN) 875-125 MG PER TABLET    Take 1 tablet by mouth every 12 (twelve) hours.    CARVEDILOL (COREG) 25 MG TABLET    25 mg.    CELECOXIB (CELEBREX) 200 MG CAPSULE    Take 1 capsule (200 mg total) by mouth 2 (two) times daily.    DILTIAZEM (CARDIZEM CD) 120 MG CP24    Take 1 capsule (120 mg total) by mouth once daily.    FLUTICASONE PROPIONATE (FLONASE) 50 MCG/ACTUATION  "NASAL SPRAY    USE 2 SPRAYS NASALLY QD    LIDOCAINE HCL 2% (XYLOCAINE) 2 % JELLY    Apply topically as needed. Apply to affected area every 8 hr as needed    MOMETASONE (NASONEX) 50 MCG/ACTUATION NASAL SPRAY    Nasonex 50 mcg/actuation Spray   Spray 2 sprays every day by intranasal route.    PROAIR HFA 90 MCG/ACTUATION INHALER    USE 1 PUFF PO BID    PROMETHAZINE-DEXTROMETHORPHAN (PROMETHAZINE-DM) 6.25-15 MG/5 ML SYRP    5 mLs.    TORSEMIDE (DEMADEX) 20 MG TAB    Take 1 tablet (20 mg total) by mouth once daily.    UMECLIDINIUM-VILANTEROL (ANORO ELLIPTA) 62.5-25 MCG/ACTUATION DSDV    Inhale into the lungs. Controller    ZOLPIDEM (AMBIEN) 10 MG TAB    Take 1 tablet (10 mg total) by mouth every evening.    ZOLPIDEM (AMBIEN) 10 MG TAB    Take 1 tablet (10 mg total) by mouth every evening.      Objective:   There were no vitals taken for this visit.   Estimated body mass index is 38.68 kg/m² as calculated from the following:    Height as of 10/7/19: 5' 8" (1.727 m).    Weight as of 10/7/19: 115.4 kg (254 lb 6 oz).   Physical Exam   Constitutional: She is oriented to person, place, and time. She appears well-developed and well-nourished.   HENT:   Head: Normocephalic and atraumatic.   Right Ear: Hearing and tympanic membrane normal.   Left Ear: Hearing and tympanic membrane normal.   Nose: Nose normal.   Mouth/Throat: Uvula is midline, oropharynx is clear and moist and mucous membranes are normal.   Eyes: Pupils are equal, round, and reactive to light. Conjunctivae and EOM are normal.   Neck: Normal range of motion. Neck supple.   Cardiovascular: Normal rate, regular rhythm and normal heart sounds.   Pulmonary/Chest: Breath sounds normal. She has no wheezes. She has no rales.   Abdominal: Soft. Bowel sounds are normal. She exhibits no distension and no mass. There is no tenderness. There is no guarding.   Musculoskeletal: Normal range of motion. She exhibits no edema or tenderness.        Left forearm: She exhibits " swelling.        Left hand: She exhibits swelling.   Neurological: She is alert and oriented to person, place, and time. No cranial nerve deficit.   Skin: Skin is warm and dry. No rash noted. No erythema.   Left axilla with previous surgical site appears to be healing well in addition to small open wound just below axilla which is a new abscess that recently opened but does not appear to be actively infected.   Psychiatric: She has a normal mood and affect. Her speech is normal and behavior is normal. Judgment and thought content normal. Cognition and memory are normal.   Nursing note and vitals reviewed.    No results found for: WBC, HGB, HCT, PLT, CHOL, TRIG, HDL, LDLDIRECT, ALT, AST, NA, K, CL, CREATININE, BUN, CO2, TSH, PSA, INR, GLUF, HGBA1C, MICROALBUR   Assessment:      Problem List Items Addressed This Visit        Derm    Hidradenitis suppurativa of left axilla - Primary       Cardiac/Vascular    Essential hypertension       Other    Class 3 severe obesity due to excess calories without serious comorbidity with body mass index (BMI) of 40.0 to 44.9 in adult           Plan:     Patient to continue to monitor area for now and continue to follow up with her surgeon and the Infectious Disease provider that he is referring her to.  Hypertension is chronic and fairly well controlled and I will decrease carvedilol to 6.125 mg tablets twice a day and she will continue Cardizem for now.  Weight loss is strongly encouraged through diet and exercise.

## 2019-11-12 ENCOUNTER — TELEPHONE (OUTPATIENT)
Dept: FAMILY MEDICINE | Facility: CLINIC | Age: 49
End: 2019-11-12

## 2019-11-12 NOTE — TELEPHONE ENCOUNTER
----- Message from Lilia Ghosh sent at 11/12/2019 11:42 AM CST -----  Contact: patient  Patient still taking BP medicine but BP is 167/97 has questions

## 2019-11-14 ENCOUNTER — OFFICE VISIT (OUTPATIENT)
Dept: FAMILY MEDICINE | Facility: CLINIC | Age: 49
End: 2019-11-14
Payer: MEDICARE

## 2019-11-14 VITALS
HEART RATE: 66 BPM | OXYGEN SATURATION: 96 % | TEMPERATURE: 98 F | BODY MASS INDEX: 39.27 KG/M2 | SYSTOLIC BLOOD PRESSURE: 160 MMHG | DIASTOLIC BLOOD PRESSURE: 87 MMHG | HEIGHT: 68 IN | WEIGHT: 259.13 LBS

## 2019-11-14 DIAGNOSIS — I10 ESSENTIAL HYPERTENSION: ICD-10-CM

## 2019-11-14 DIAGNOSIS — E66.01 CLASS 2 SEVERE OBESITY DUE TO EXCESS CALORIES WITH SERIOUS COMORBIDITY AND BODY MASS INDEX (BMI) OF 39.0 TO 39.9 IN ADULT: ICD-10-CM

## 2019-11-14 PROCEDURE — 99214 OFFICE O/P EST MOD 30 MIN: CPT | Mod: S$GLB,,, | Performed by: FAMILY MEDICINE

## 2019-11-14 PROCEDURE — 99214 PR OFFICE/OUTPT VISIT, EST, LEVL IV, 30-39 MIN: ICD-10-PCS | Mod: S$GLB,,, | Performed by: FAMILY MEDICINE

## 2019-11-14 RX ORDER — DILTIAZEM HYDROCHLORIDE 120 MG/1
120 CAPSULE, COATED, EXTENDED RELEASE ORAL DAILY
Qty: 30 CAPSULE | Refills: 5 | Status: SHIPPED | OUTPATIENT
Start: 2019-11-14 | End: 2019-11-18

## 2019-11-14 RX ORDER — CARVEDILOL 6.25 MG/1
TABLET ORAL
Refills: 5 | COMMUNITY
Start: 2019-10-24 | End: 2020-04-23

## 2019-11-14 NOTE — PROGRESS NOTES
Subjective:      Patient ID: Nanyc Sun is a 49 y.o. female.    Chief Complaint: Follow-up and Hypertension    52-year-old female in for follow-up on hypertension.  At her last visit she was started on Cardizem 120 mg in addition to carvedilol 6.25 mg b.i.d..  She has been tried on multiple blood pressure medicines in the past and has had side effects from mostly all of the other so far.  She reports her blood pressure is still staying high despite taking this Cardizem.  She denies any side effects at this time.  She states she is having headaches so she feels her blood pressure is still staying high.  She was recently started on antibiotics by infectious disease specialist for the hidradenitis.    Review of Systems   Constitutional: Negative for fever.   HENT: Negative for ear pain, postnasal drip, rhinorrhea, sinus pain and sore throat.    Eyes: Negative for redness.   Respiratory: Negative for cough, chest tightness, shortness of breath and wheezing.    Cardiovascular: Negative for chest pain, palpitations and leg swelling.   Gastrointestinal: Negative for constipation, diarrhea, nausea and vomiting.   Genitourinary: Negative for difficulty urinating and dysuria.   Musculoskeletal: Negative for arthralgias.   Skin: Negative for rash.   Neurological: Positive for headaches. Negative for dizziness.     Medication List with Changes/Refills   Current Medications    AMOXICILLIN-CLAVULANATE 875-125MG (AUGMENTIN) 875-125 MG PER TABLET    Take 1 tablet by mouth every 12 (twelve) hours.    CARVEDILOL (COREG) 6.25 MG TABLET    TK 1 T PO BID    CELECOXIB (CELEBREX) 200 MG CAPSULE    Take 1 capsule (200 mg total) by mouth 2 (two) times daily.    FLUTICASONE PROPIONATE (FLONASE) 50 MCG/ACTUATION NASAL SPRAY    USE 2 SPRAYS NASALLY QD    LIDOCAINE HCL 2% (XYLOCAINE) 2 % JELLY    Apply topically as needed. Apply to affected area every 8 hr as needed    MOMETASONE (NASONEX) 50 MCG/ACTUATION NASAL SPRAY    Nasonex 50  "mcg/actuation Spray   Spray 2 sprays every day by intranasal route.    PROAIR HFA 90 MCG/ACTUATION INHALER    USE 1 PUFF PO BID    PROMETHAZINE-DEXTROMETHORPHAN (PROMETHAZINE-DM) 6.25-15 MG/5 ML SYRP    5 mLs.    TORSEMIDE (DEMADEX) 20 MG TAB    Take 1 tablet (20 mg total) by mouth once daily.    UMECLIDINIUM-VILANTEROL (ANORO ELLIPTA) 62.5-25 MCG/ACTUATION DSDV    Inhale into the lungs. Controller    ZOLPIDEM (AMBIEN) 10 MG TAB    Take 1 tablet (10 mg total) by mouth every evening.    ZOLPIDEM (AMBIEN) 10 MG TAB    Take 1 tablet (10 mg total) by mouth every evening.   Changed and/or Refilled Medications    Modified Medication Previous Medication    DILTIAZEM (CARDIZEM CD) 120 MG CP24 diltiaZEM (CARDIZEM CD) 120 MG Cp24       Take 1 capsule (120 mg total) by mouth once daily.    Take 1 capsule (120 mg total) by mouth once daily.      Objective:   BP (!) 160/87 (BP Location: Right arm, Patient Position: Sitting, BP Method: Medium (Automatic)) Comment (BP Location): forearm  Pulse 66   Temp 98.3 °F (36.8 °C)   Ht 5' 8" (1.727 m)   Wt 117.5 kg (259 lb 2 oz)   SpO2 96%   BMI 39.40 kg/m²    Estimated body mass index is 39.4 kg/m² as calculated from the following:    Height as of this encounter: 5' 8" (1.727 m).    Weight as of this encounter: 117.5 kg (259 lb 2 oz).   Physical Exam   Constitutional: She is oriented to person, place, and time. She appears well-developed and well-nourished.   HENT:   Head: Normocephalic and atraumatic.   Right Ear: Hearing and tympanic membrane normal.   Left Ear: Hearing and tympanic membrane normal.   Nose: Nose normal.   Mouth/Throat: Uvula is midline, oropharynx is clear and moist and mucous membranes are normal.   Cardiovascular: Normal rate, regular rhythm and normal heart sounds.   Pulmonary/Chest: Effort normal and breath sounds normal. She has no wheezes. She has no rales.   Abdominal: Soft. Bowel sounds are normal. She exhibits no distension and no mass. There is no " tenderness. There is no guarding.   Musculoskeletal: Normal range of motion. She exhibits no edema or tenderness.   Neurological: She is alert and oriented to person, place, and time. No cranial nerve deficit.   Skin: Skin is warm and dry. No rash noted. No erythema.   Psychiatric: She has a normal mood and affect. Her speech is normal and behavior is normal. Judgment and thought content normal. Cognition and memory are normal.   Nursing note and vitals reviewed.    No results found for: WBC, HGB, HCT, PLT, CHOL, TRIG, HDL, LDLDIRECT, ALT, AST, NA, K, CL, CREATININE, BUN, CO2, TSH, PSA, INR, GLUF, HGBA1C, MICROALBUR   Assessment:      Problem List Items Addressed This Visit        Cardiac/Vascular    Essential hypertension    Relevant Medications    diltiaZEM (CARDIZEM CD) 120 MG Cp24       Other    Class 3 severe obesity due to excess calories without serious comorbidity with body mass index (BMI) of 40.0 to 44.9 in adult           Plan:   Had a long discussion with patient about the need for her to lose weight through diet and exercise in order to help her blood pressure to stay down.  He will also help her back pain and bilateral knee arthritis pain as well.  I feel headaches that she is experiencing may be come from her blood pressure so I will increase the Cardizem to 180 mg daily.  She will follow up in a month.

## 2019-11-18 ENCOUNTER — TELEPHONE (OUTPATIENT)
Dept: FAMILY MEDICINE | Facility: CLINIC | Age: 49
End: 2019-11-18

## 2019-11-18 DIAGNOSIS — I10 ESSENTIAL HYPERTENSION: ICD-10-CM

## 2019-11-18 RX ORDER — DILTIAZEM HYDROCHLORIDE 180 MG/1
180 CAPSULE, COATED, EXTENDED RELEASE ORAL DAILY
Qty: 30 CAPSULE | Refills: 5 | Status: SHIPPED | OUTPATIENT
Start: 2019-11-18 | End: 2019-11-18 | Stop reason: CLARIF

## 2019-11-18 RX ORDER — DILTIAZEM HYDROCHLORIDE 180 MG/1
180 CAPSULE, COATED, EXTENDED RELEASE ORAL DAILY
Qty: 30 CAPSULE | Refills: 5 | Status: SHIPPED | OUTPATIENT
Start: 2019-11-18 | End: 2020-04-06 | Stop reason: SDUPTHER

## 2019-11-18 NOTE — TELEPHONE ENCOUNTER
----- Message from Lilia Ghosh sent at 11/18/2019  8:37 AM CST -----  Contact: patient  Dr Canales was supposed to up her Cardizem to 180 was 120 she called it in to [facility] [address] with 120 and they patient picked up the script. It does say in her notes on the last visit to up her dose since her blood pressure is still high

## 2019-12-17 DIAGNOSIS — I10 ESSENTIAL HYPERTENSION: ICD-10-CM

## 2019-12-17 RX ORDER — ZOLPIDEM TARTRATE 10 MG/1
10 TABLET ORAL NIGHTLY
Qty: 30 TABLET | Refills: 2 | Status: SHIPPED | OUTPATIENT
Start: 2019-12-17 | End: 2019-12-18

## 2019-12-17 NOTE — TELEPHONE ENCOUNTER
----- Message from Tisha Feliciano sent at 12/17/2019  3:11 PM CST -----  Refill request    ambien     [facility]

## 2019-12-18 ENCOUNTER — TELEPHONE (OUTPATIENT)
Dept: FAMILY MEDICINE | Facility: CLINIC | Age: 49
End: 2019-12-18

## 2019-12-18 DIAGNOSIS — F51.01 PRIMARY INSOMNIA: Primary | ICD-10-CM

## 2019-12-18 RX ORDER — ZOLPIDEM TARTRATE 10 MG/1
10 TABLET ORAL NIGHTLY PRN
Qty: 30 TABLET | Refills: 2 | Status: SHIPPED | OUTPATIENT
Start: 2019-12-18 | End: 2020-03-02 | Stop reason: SDUPTHER

## 2019-12-18 NOTE — TELEPHONE ENCOUNTER
----- Message from Tisha Feliciano sent at 12/18/2019  8:52 AM CST -----  Pts ambien was sent to the wrong Pharmacy on yesterday its to go to [facility] she has no car so its hard for her to get to around.

## 2020-03-02 DIAGNOSIS — F51.01 PRIMARY INSOMNIA: ICD-10-CM

## 2020-03-02 RX ORDER — ZOLPIDEM TARTRATE 10 MG/1
10 TABLET ORAL NIGHTLY
Qty: 30 TABLET | Refills: 2 | Status: SHIPPED | OUTPATIENT
Start: 2020-03-02 | End: 2020-05-29 | Stop reason: SDUPTHER

## 2020-03-02 NOTE — TELEPHONE ENCOUNTER
----- Message from Lilia Ghosh sent at 3/2/2020 11:06 AM CST -----  Contact: patient  Needs a refill on Ambien 10mg  [facility] on [address]

## 2020-04-06 DIAGNOSIS — I10 ESSENTIAL HYPERTENSION: ICD-10-CM

## 2020-04-06 RX ORDER — DILTIAZEM HYDROCHLORIDE 180 MG/1
180 CAPSULE, COATED, EXTENDED RELEASE ORAL DAILY
Qty: 30 CAPSULE | Refills: 5 | Status: SHIPPED | OUTPATIENT
Start: 2020-04-06 | End: 2020-04-23

## 2020-04-22 ENCOUNTER — TELEPHONE (OUTPATIENT)
Dept: INTERNAL MEDICINE | Facility: CLINIC | Age: 50
End: 2020-04-22

## 2020-04-22 NOTE — TELEPHONE ENCOUNTER
lulu will call pt to make appt... messge left for pt to call [facility] for appt...    lulu will call again to try to schedule appt

## 2020-04-22 NOTE — TELEPHONE ENCOUNTER
Pt stated her BP hs been running high  and 160/100 today,  Day before yesterday 180/100    Pt stated she have mild COPD and is  SOB for 5 days and gasping for air and not sure if its due to the high BP..    Pt went to [facility] Sunday and she stated they gave her an inhaler and that was it    Please advise

## 2020-04-23 ENCOUNTER — OFFICE VISIT (OUTPATIENT)
Dept: INTERNAL MEDICINE | Facility: CLINIC | Age: 50
End: 2020-04-23
Payer: MEDICARE

## 2020-04-23 DIAGNOSIS — G47.00 INSOMNIA, UNSPECIFIED TYPE: ICD-10-CM

## 2020-04-23 DIAGNOSIS — I10 ESSENTIAL HYPERTENSION: Primary | ICD-10-CM

## 2020-04-23 PROCEDURE — 99441 PR PHYSICIAN TELEPHONE EVALUATION 5-10 MIN: CPT | Mod: 95,,, | Performed by: INTERNAL MEDICINE

## 2020-04-23 PROCEDURE — 99441 PR PHYSICIAN TELEPHONE EVALUATION 5-10 MIN: ICD-10-PCS | Mod: 95,,, | Performed by: INTERNAL MEDICINE

## 2020-04-23 RX ORDER — CARVEDILOL 12.5 MG/1
12.5 TABLET ORAL 2 TIMES DAILY WITH MEALS
Qty: 60 TABLET | Refills: 11 | Status: SHIPPED | OUTPATIENT
Start: 2020-04-23 | End: 2020-05-29

## 2020-04-23 RX ORDER — HYDROCHLOROTHIAZIDE 25 MG/1
25 TABLET ORAL DAILY
Qty: 30 TABLET | Refills: 11 | Status: SHIPPED | OUTPATIENT
Start: 2020-04-23 | End: 2020-11-24 | Stop reason: SDUPTHER

## 2020-04-23 NOTE — PROGRESS NOTES
Established Patient - Audio Only Telehealth Visit     The patient location is: (Home) [facility]   The chief complaint leading to consultation is: HTN  Visit type: Virtual visit with audio only (telephone)     The reason for the audio only service rather than synchronous audio and video virtual visit was related to technical difficulties or patient preference/necessity.     Each patient to whom I provide medical services by telemedicine is:  (1) informed of the relationship between the physician and patient and the respective role of any other health care provider with respect to management of the patient; and (2) notified that they may decline to receive medical services by telemedicine and may withdraw from such care at any time. Patient verbally consented to receive this service via voice-only telephone call.        Subjective:      Patient ID: Nancy Sun is a 49 y.o. female.    Chief Complaint: Hypertension    HPI: Patient has h/o HTN and BP has been runing high for last week. 180s/100. Patient is taking Carvedilol and Cqrdiazam without much help. Patient reports adherenec with medications and not adherent to diet. Patient denies snoring at night.     Patient reports using Ambien for Insomnia.     Review of Systems   Constitutional: Negative for chills, diaphoresis, fever, malaise/fatigue and weight loss.   HENT: Negative for congestion, ear pain, sinus pain, sore throat and tinnitus.    Eyes: Negative for blurred vision and photophobia.   Respiratory: Positive for shortness of breath and wheezing. Negative for cough and hemoptysis.    Cardiovascular: Negative for chest pain, palpitations, orthopnea, leg swelling and PND.   Gastrointestinal: Negative for abdominal pain, blood in stool, constipation, diarrhea, heartburn, melena, nausea and vomiting.   Genitourinary: Negative for dysuria, frequency and urgency.   Musculoskeletal: Negative for back pain, myalgias and neck pain.   Skin: Negative for rash.    Neurological: Negative for dizziness, tremors, seizures, loss of consciousness and weakness.   Endo/Heme/Allergies: Negative for polydipsia.   Psychiatric/Behavioral: Negative for depression and hallucinations. The patient does not have insomnia.      Objective:     Patient not examined.     Assessment:       ICD-10-CM ICD-9-CM   1. Essential hypertension I10 401.9   2. Insomnia, unspecified type G47.00 780.52       Plan:    Patient blood pressures are running high.   Will increase carvedilol to 12.5 mg and add hydrochlorothiazide  Patient insomnia is improved with Ambien.  Will continue medication.  Patient to come to clinic to get blood pressures checked on Monday.  Will follow-up patient in a week  Ordered Annual Wellness exam labs. Advised patient to get the labs done         This service was not originating from a related E/M service provided within the previous 7 days nor will  to an E/M service or procedure within the next 24 hours or my soonest available appointment.  Prevailing standard of care was able to be met in this audio-only visit.

## 2020-04-24 ENCOUNTER — TELEPHONE (OUTPATIENT)
Dept: INTERNAL MEDICINE | Facility: CLINIC | Age: 50
End: 2020-04-24

## 2020-04-24 LAB
ABS NRBC COUNT: 0 X 10 3/UL (ref 0–0.01)
ABSOLUTE BASOPHIL: 0.04 X 10 3/UL (ref 0–0.22)
ABSOLUTE EOSINOPHIL: 0.22 X 10 3/UL (ref 0.04–0.54)
ABSOLUTE IMMATURE GRAN: 0.06 X 10 3/UL (ref 0–0.04)
ABSOLUTE LYMPHOCYTE: 2.21 X 10 3/UL (ref 0.86–4.75)
ABSOLUTE MONOCYTE: 0.44 X 10 3/UL (ref 0.22–1.08)
ALBUMIN SERPL-MCNC: 4.1 G/DL (ref 3.5–5.2)
ALBUMIN/GLOB SERPL ELPH: 1.3 {RATIO} (ref 1–2.7)
ALP ISOS SERPL LEV INH-CCNC: 138 U/L (ref 35–105)
ALT (SGPT): 23 U/L (ref 0–33)
ANION GAP SERPL CALC-SCNC: 13 MMOL/L (ref 8–17)
AST SERPL-CCNC: 16 U/L (ref 0–32)
BASOPHILS NFR BLD: 0.6 % (ref 0.2–1.2)
BILIRUBIN, TOTAL: 0.4 MG/DL (ref 0–1.2)
BUN/CREAT SERPL: 21.3 (ref 6–20)
CALCIUM SERPL-MCNC: 9.3 MG/DL (ref 8.6–10.2)
CARBON DIOXIDE, CO2: 24 MMOL/L (ref 22–29)
CHLORIDE: 105 MMOL/L (ref 98–107)
CHOLEST SERPL-MSCNC: 141 MG/DL (ref 100–200)
CREAT SERPL-MCNC: 0.64 MG/DL (ref 0.5–0.9)
EOSINOPHIL NFR BLD: 3.5 % (ref 0.7–7)
GFR ESTIMATION: 97.83
GLOBULIN: 3.2 G/DL (ref 1.5–4.5)
GLUCOSE: 102 MG/DL (ref 74–106)
HCT VFR BLD AUTO: 38.1 % (ref 37–47)
HDLC SERPL-MCNC: 53 MG/DL
HGB BLD-MCNC: 11.6 G/DL (ref 12–16)
IMMATURE GRANULOCYTES: 0.9 % (ref 0–0.5)
LDL/HDL RATIO: 1.4 (ref 1–3)
LDLC SERPL CALC-MCNC: 74.6 MG/DL (ref 0–100)
LYMPHOCYTES NFR BLD: 35 % (ref 19.3–53.1)
MCH RBC QN AUTO: 28.2 PG (ref 27–32)
MCHC RBC AUTO-ENTMCNC: 30.4 G/DL (ref 32–36)
MCV RBC AUTO: 92.5 FL (ref 82–100)
MONOCYTES NFR BLD: 7 % (ref 4.7–12.5)
NEUTROPHILS ABSOLUTE COUNT: 3.35 X 10 3/UL (ref 2.15–7.56)
NEUTROPHILS NFR BLD: 53 %
NUCLEATED RED BLOOD CELLS: 0 /100 WBC (ref 0–0.2)
PLATELET # BLD AUTO: 221 X 10 3/UL (ref 135–400)
POTASSIUM: 4 MMOL/L (ref 3.5–5.1)
PROT SNV-MCNC: 7.3 G/DL (ref 6.4–8.3)
RBC # BLD AUTO: 4.12 X 10 6/UL (ref 4.2–5.4)
RDW-SD: 46.5 FL (ref 37–54)
SODIUM: 142 MMOL/L (ref 136–145)
TRIGL SERPL-MCNC: 67 MG/DL (ref 0–150)
TSH SERPL DL<=0.005 MIU/L-ACNC: 1.38 UIU/ML (ref 0.27–4.2)
UREA NITROGEN (BUN): 13.6 MG/DL (ref 6–20)
WBC # BLD: 6.32 X 10 3/UL (ref 4.3–10.8)

## 2020-04-24 NOTE — TELEPHONE ENCOUNTER
----- Message from Jeffry Paz sent at 4/24/2020  2:23 PM CDT -----  Contact: self  Requesting call back regarding questions on if provider wants pt to stop taking rx cartia. Please call back at [phone]

## 2020-04-28 ENCOUNTER — TELEPHONE (OUTPATIENT)
Dept: INTERNAL MEDICINE | Facility: CLINIC | Age: 50
End: 2020-04-28

## 2020-04-28 NOTE — TELEPHONE ENCOUNTER
----- Message from Malika Denny sent at 4/27/2020  3:09 PM CDT -----  Contact: pt   Type:  Needs Medical Advice    Who Called: pt  Symptoms (please be specific): chest pains from the med change   How long has patient had these symptoms: amanda her dosage was changed  Pharmacy name and phone #:    Would the patient rather a call back or a response via [facility]? phone  Best Call Back Number: [phone]  Additional Information: went to the ER today for it and was told it could be the side effects of the medicine and pt is wnting to discuss

## 2020-04-30 ENCOUNTER — OFFICE VISIT (OUTPATIENT)
Dept: INTERNAL MEDICINE | Facility: CLINIC | Age: 50
End: 2020-04-30
Payer: MEDICARE

## 2020-04-30 DIAGNOSIS — I10 ESSENTIAL HYPERTENSION: Primary | ICD-10-CM

## 2020-04-30 PROCEDURE — 99441 PR PHYSICIAN TELEPHONE EVALUATION 5-10 MIN: ICD-10-PCS | Mod: 95,,, | Performed by: INTERNAL MEDICINE

## 2020-04-30 PROCEDURE — 99441 PR PHYSICIAN TELEPHONE EVALUATION 5-10 MIN: CPT | Mod: 95,,, | Performed by: INTERNAL MEDICINE

## 2020-04-30 RX ORDER — HYDRALAZINE HYDROCHLORIDE 50 MG/1
50 TABLET, FILM COATED ORAL EVERY 12 HOURS
Qty: 60 TABLET | Refills: 11 | Status: SHIPPED | OUTPATIENT
Start: 2020-04-30 | End: 2020-05-29

## 2020-04-30 NOTE — PROGRESS NOTES
Established Patient - Audio Only Telehealth Visit     The patient location is: (home) [facility]   The chief complaint leading to consultation is: HTN   Visit type: Virtual visit with audio only (telephone)     The reason for the audio only service rather than synchronous audio and video virtual visit was related to technical difficulties or patient preference/necessity.     Each patient to whom I provide medical services by telemedicine is:  (1) informed of the relationship between the physician and patient and the respective role of any other health care provider with respect to management of the patient; and (2) notified that they may decline to receive medical services by telemedicine and may withdraw from such care at any time. Patient verbally consented to receive this service via voice-only telephone call.        Subjective:      Patient ID: Nancy Sun is a 49 y.o. female.    Chief Complaint: Hypertension    HPI: Patient has h/o HTN and BP has been runing high for last week. 180s/100. Patient was  taking Carvedilol and Cardiazam without much help. Patient reports adherenec with medications and not adherent to diet. Patient Carvedilol Was increased to 12.5 mg from 6.25. Patient reports she developed chest pain and went to ER and all the testing were negative. Patient Has stopped taking Carvedilol. Patient reports BP are running high.     Patient reports using Ambien for Insomnia.     Review of Systems   Constitutional: Negative for chills, diaphoresis, fever, malaise/fatigue and weight loss.   HENT: Negative for congestion, ear pain, sinus pain, sore throat and tinnitus.    Eyes: Negative for blurred vision and photophobia.   Respiratory: Positive for shortness of breath and wheezing. Negative for cough and hemoptysis.    Cardiovascular: Negative for chest pain, palpitations, orthopnea, leg swelling and PND.   Gastrointestinal: Negative for abdominal pain, blood in stool, constipation, diarrhea,  "heartburn, melena, nausea and vomiting.   Genitourinary: Negative for dysuria, frequency and urgency.   Musculoskeletal: Negative for back pain, myalgias and neck pain.   Skin: Negative for rash.   Neurological: Negative for dizziness, tremors, seizures, loss of consciousness and weakness.   Endo/Heme/Allergies: Negative for polydipsia.   Psychiatric/Behavioral: Negative for depression and hallucinations. The patient does not have insomnia.      Objective:     Patient not examined.     Assessment:     No diagnosis found.    Plan:   Patient insists that carvedilol call the chest pain and is not willing to take the medication.  Patient reports medicine is "too strong"  Patient is offered amlodipine but patient declined as the medicine cause shortness of breath in past.    Patient is allergic to losartan.  Will try hydralazine 50 mg twice a day  Advised patient to monitor blood pressures at home, patient reports she will not be able to as she does not have a machine + pharmacies have removed blood pressure machine secondary to COVID-19.  Offered patient to come to clinic to get blood pressure checked but that was declined as patient does not have any transportation  Consult patient that it will be difficult to control blood pressure if patient continued to have side effect of all medications.       This service was not originating from a related E/M service provided within the previous 7 days nor will  to an E/M service or procedure within the next 24 hours or my soonest available appointment.  Prevailing standard of care was able to be met in this audio-only visit.                         This service was not originating from a related E/M service provided within the previous 7 days nor will  to an E/M service or procedure within the next 24 hours or my soonest available appointment.  Prevailing standard of care was able to be met in this audio-only visit.      "

## 2020-05-14 ENCOUNTER — TELEPHONE (OUTPATIENT)
Dept: FAMILY MEDICINE | Facility: CLINIC | Age: 50
End: 2020-05-14

## 2020-05-14 NOTE — TELEPHONE ENCOUNTER
----- Message from Gladys Davis sent at 5/14/2020  9:53 AM CDT -----  Contact: pt  Type:  Needs Medical Advice    Who Called: pt  Symptoms (please be specific): Arm is swelling  How long has patient had these symptoms:2 weeks   Pharmacy name and phone #:  n/a  Would the patient rather a call back or a response via [facility]? Call back  Best Call Back Number: [phone]  Additional Information: Caller is calling in regards to swelling under her arm and on top of her hand//

## 2020-05-15 NOTE — TELEPHONE ENCOUNTER
Patient states that she has Lymphedema, she wants to get a referral to go back to [facility] to have outpatient physical therapy where they massage the swelling. She's done so in the past and says that it worked for her.

## 2020-05-29 ENCOUNTER — OFFICE VISIT (OUTPATIENT)
Dept: INTERNAL MEDICINE | Facility: CLINIC | Age: 50
End: 2020-05-29
Payer: MEDICARE

## 2020-05-29 VITALS
HEART RATE: 67 BPM | OXYGEN SATURATION: 98 % | WEIGHT: 269 LBS | DIASTOLIC BLOOD PRESSURE: 93 MMHG | SYSTOLIC BLOOD PRESSURE: 153 MMHG | RESPIRATION RATE: 16 BRPM | TEMPERATURE: 97 F | BODY MASS INDEX: 40.77 KG/M2 | HEIGHT: 68 IN

## 2020-05-29 DIAGNOSIS — J45.909 ASTHMA, UNSPECIFIED ASTHMA SEVERITY, UNSPECIFIED WHETHER COMPLICATED, UNSPECIFIED WHETHER PERSISTENT: Primary | ICD-10-CM

## 2020-05-29 DIAGNOSIS — I10 ESSENTIAL HYPERTENSION: ICD-10-CM

## 2020-05-29 DIAGNOSIS — L08.9 SKIN INFECTION: ICD-10-CM

## 2020-05-29 DIAGNOSIS — F51.01 PRIMARY INSOMNIA: ICD-10-CM

## 2020-05-29 PROCEDURE — 99214 OFFICE O/P EST MOD 30 MIN: CPT | Mod: S$GLB,,, | Performed by: NURSE PRACTITIONER

## 2020-05-29 PROCEDURE — 99214 PR OFFICE/OUTPT VISIT, EST, LEVL IV, 30-39 MIN: ICD-10-PCS | Mod: S$GLB,,, | Performed by: NURSE PRACTITIONER

## 2020-05-29 RX ORDER — ALBUTEROL SULFATE 90 UG/1
AEROSOL, METERED RESPIRATORY (INHALATION)
Qty: 18 G | Refills: 2 | Status: SHIPPED | OUTPATIENT
Start: 2020-05-29 | End: 2020-12-30 | Stop reason: SDUPTHER

## 2020-05-29 RX ORDER — SULFAMETHOXAZOLE AND TRIMETHOPRIM 800; 160 MG/1; MG/1
1 TABLET ORAL 2 TIMES DAILY
Qty: 20 TABLET | Refills: 0 | Status: SHIPPED | OUTPATIENT
Start: 2020-05-29 | End: 2020-06-18

## 2020-05-29 RX ORDER — AMLODIPINE BESYLATE 10 MG/1
10 TABLET ORAL DAILY
Qty: 30 TABLET | Refills: 1 | Status: SHIPPED | OUTPATIENT
Start: 2020-05-29 | End: 2020-07-21

## 2020-05-29 RX ORDER — ZOLPIDEM TARTRATE 10 MG/1
10 TABLET ORAL NIGHTLY
Qty: 30 TABLET | Refills: 0 | Status: SHIPPED | OUTPATIENT
Start: 2020-05-29 | End: 2020-07-23 | Stop reason: SDUPTHER

## 2020-05-29 NOTE — PROGRESS NOTES
"Subjective:       Patient ID: Nancy Sun is a 49 y.o. female.    Chief Complaint: Medication Problem (pt c/o BP med too strong) and Mass ( L arm pit have a growth in area where boil was twice and now its back in another spot its hard and hurts )    Patient has h/o HTN and BP has been runing high for last week. Pt has been on hctz and carvedilol- failed on many htn meds. Patient Carvedilol Was increased to 12.5 mg from 6.25. Patient reports she developed chest pain and went to ER and all the testing were negative.      Patient using Ambien for Insomnia.      Pt states hydralazine causing chest tightness and still htn not well controlled- will d/c and start amlodipine- will monitor for lower leg edema worsening    States she has an appt with gen sx she doesn't know his name next week re left axilla growths have been - she is not sure if it was abcess, cyst, or lymph node issue- dr agudelo was surgeon will try to get note    Pt states onset nodular lesion to left armpit with tenderness to it one week ago- states it has increased in size in the last week- states temp 100.7 last week- was seen in the er states was given pain meds and antibiotics but she didn't take antibiotics but states only can take "special antibiotic from the infection doctor"    Review of Systems   Constitutional: Negative.    HENT: Negative.    Respiratory: Negative.    Cardiovascular: Negative.    Gastrointestinal: Negative.    Genitourinary: Negative for dysuria.   Skin:        Lesion to left axilla   Psychiatric/Behavioral: Positive for sleep disturbance.       Objective:      Physical Exam   Constitutional: She is oriented to person, place, and time. She appears well-developed and well-nourished.   HENT:   Head: Normocephalic.   Eyes: Pupils are equal, round, and reactive to light. Right eye exhibits no discharge. Left eye exhibits no discharge.   Cardiovascular: Normal rate, regular rhythm and normal heart sounds.   No murmur " heard.  Pulmonary/Chest: Effort normal and breath sounds normal. She has no wheezes. She has no rales.   Musculoskeletal: Normal range of motion. She exhibits edema (+1 bilat lower legs).   Neurological: She is alert and oriented to person, place, and time.   Skin: Skin is warm and dry.   Indurated tender lesion to left axillar approx 0.5 cm - no drainage, no surrounding erythema, lesion is not with warmth compared to surrounding tissue   Psychiatric: She has a normal mood and affect. Her behavior is normal. Judgment normal.   Nursing note and vitals reviewed.      Assessment:       1. Asthma, unspecified asthma severity, unspecified whether complicated, unspecified whether persistent    2. Primary insomnia        Plan:       1. Asthma, unspecified asthma severity, unspecified whether complicated, unspecified whether persistent  PROAIR HFA 90 mcg/actuation inhaler   2. Primary insomnia  zolpidem (AMBIEN) 10 mg Tab   3. Essential hypertension  amLODIPine (NORVASC) 10 MG tablet   4. Skin infection  sulfamethoxazole-trimethoprim 800-160mg (BACTRIM DS) 800-160 mg Tab   d/c hydralazine due to pt reports causing chest discomfort

## 2020-06-05 ENCOUNTER — CLINICAL SUPPORT (OUTPATIENT)
Dept: INTERNAL MEDICINE | Facility: CLINIC | Age: 50
End: 2020-06-05
Payer: MEDICARE

## 2020-06-05 VITALS — SYSTOLIC BLOOD PRESSURE: 139 MMHG | OXYGEN SATURATION: 99 % | HEART RATE: 90 BPM | DIASTOLIC BLOOD PRESSURE: 90 MMHG

## 2020-06-05 DIAGNOSIS — I10 ESSENTIAL HYPERTENSION: Primary | ICD-10-CM

## 2020-06-05 NOTE — PROGRESS NOTES
Improved from last visit in the office, pt has very limited options for htn due to not tolerating many classes, keep follow up with dr fonseca in the next 2 weeks

## 2020-06-08 ENCOUNTER — PATIENT OUTREACH (OUTPATIENT)
Dept: ADMINISTRATIVE | Facility: HOSPITAL | Age: 50
End: 2020-06-08

## 2020-06-18 ENCOUNTER — OFFICE VISIT (OUTPATIENT)
Dept: INTERNAL MEDICINE | Facility: CLINIC | Age: 50
End: 2020-06-18
Payer: MEDICARE

## 2020-06-18 VITALS
TEMPERATURE: 99 F | DIASTOLIC BLOOD PRESSURE: 82 MMHG | HEART RATE: 68 BPM | BODY MASS INDEX: 40.77 KG/M2 | WEIGHT: 269 LBS | OXYGEN SATURATION: 96 % | HEIGHT: 68 IN | RESPIRATION RATE: 16 BRPM | SYSTOLIC BLOOD PRESSURE: 123 MMHG

## 2020-06-18 DIAGNOSIS — G47.00 INSOMNIA, UNSPECIFIED TYPE: Primary | ICD-10-CM

## 2020-06-18 DIAGNOSIS — I10 ESSENTIAL HYPERTENSION: ICD-10-CM

## 2020-06-18 DIAGNOSIS — Z23 NEED FOR SHINGLES VACCINE: ICD-10-CM

## 2020-06-18 DIAGNOSIS — E66.01 MORBID OBESITY: ICD-10-CM

## 2020-06-18 PROBLEM — M25.462 EFFUSION OF LEFT KNEE: Status: ACTIVE | Noted: 2019-07-11

## 2020-06-18 PROCEDURE — 99214 PR OFFICE/OUTPT VISIT, EST, LEVL IV, 30-39 MIN: ICD-10-PCS | Mod: S$GLB,,, | Performed by: INTERNAL MEDICINE

## 2020-06-18 PROCEDURE — 99214 OFFICE O/P EST MOD 30 MIN: CPT | Mod: S$GLB,,, | Performed by: INTERNAL MEDICINE

## 2020-06-18 RX ORDER — ZOSTER VACCINE RECOMBINANT, ADJUVANTED 50 MCG/0.5
KIT INTRAMUSCULAR
Qty: 0.5 ML | Refills: 0 | Status: SHIPPED | OUTPATIENT
Start: 2020-06-18 | End: 2021-05-14 | Stop reason: SINTOL

## 2020-06-18 NOTE — PROGRESS NOTES
Subjective:       Patient ID: Nancy Sun is a 49 y.o. female.    Chief Complaint: Arm Pain    Patient with h/o HTN and BP seem under good control.     Patient with h/o Hidradenitis cyst in left axilla removed  On 6/16/2020. Patient reports the wound is healing well. Patient reports pain in the axillary area and is prescribed pain medications by the surgeon. Patient has two small blister, on the lower part of the axilla. These are not painful, no redness, no fever or chills.      Patient using Ambien for Insomnia. Patient requests refill of Ambien and reports that she is out of medication. Patient took last tablet yesterday.        Review of Systems   Constitutional: Negative for activity change, appetite change, chills, diaphoresis, fatigue, fever and unexpected weight change.   HENT: Negative for congestion, ear pain, postnasal drip, rhinorrhea, sinus pressure, sinus pain and sore throat.    Respiratory: Negative for apnea, cough, chest tightness, shortness of breath and wheezing.    Cardiovascular: Negative for chest pain, palpitations and leg swelling.   Gastrointestinal: Negative for abdominal distention, abdominal pain, blood in stool, constipation, diarrhea, nausea and vomiting.   Endocrine: Negative for cold intolerance, heat intolerance, polydipsia and polyuria.   Genitourinary: Negative for difficulty urinating, dysuria, frequency, hematuria and urgency.   Musculoskeletal: Negative for arthralgias, back pain and myalgias.   Skin:        Lesion to left axilla   Allergic/Immunologic: Negative for environmental allergies.   Neurological: Negative for dizziness, tremors, seizures, syncope, weakness, light-headedness and numbness.   Hematological: Does not bruise/bleed easily.   Psychiatric/Behavioral: Positive for sleep disturbance. Negative for agitation and suicidal ideas. The patient is not nervous/anxious.        Objective:      Physical Exam  Vitals signs and nursing note reviewed.   Constitutional:        Appearance: She is well-developed.   HENT:      Head: Normocephalic.   Eyes:      General:         Right eye: No discharge.         Left eye: No discharge.      Pupils: Pupils are equal, round, and reactive to light.   Cardiovascular:      Rate and Rhythm: Normal rate and regular rhythm.      Heart sounds: Normal heart sounds. No murmur.   Pulmonary:      Effort: Pulmonary effort is normal.      Breath sounds: Normal breath sounds. No wheezing or rales.   Musculoskeletal: Normal range of motion.   Skin:     General: Skin is warm and dry.      Comments: Left axilla has 4 stitches and the surgical wound is healing well, no erythema or redness.     There are two 0.5cm round raised, fluid filled lesions on the lower posterior part of axilla.      Neurological:      Mental Status: She is alert and oriented to person, place, and time.   Psychiatric:         Behavior: Behavior normal.         Judgment: Judgment normal.         Assessment:       1. Insomnia, unspecified type    2. Morbid obesity    3. Essential hypertension    4. Need for shingles vaccine       Plan:     patient blood pressure seem under good control.  Will continue medication  Consult patient about need to lose weight.  Patient requested refill on Ambien and reported that she has taken her last Ambien yesterday.   Patient last and BN was failed 20 days ago a prescription of 30 tablets.  When inquired if she ever took more than 1 tablet patient initially declined.  When questioned again that how can she consumed 30 tablets in 20 days when she does not take Ambien more than prescribed.  She reported that she has taken an extra tablet a few times to sleep in the evening.  One patient was reminded that she should not be using medication more than recommended by physician.   She reported that she has taken medication in the evening 1 or 2 times only.  On this she was questioned if she has used it only 1 or 2 times than why she is out of medication.   This  time patient reported that she still has 10 tablets left.   Counseled the patient in detail the medication should be taken as prescribed.   Ambien should not be used more than 6.5 mg in female patients.  Will refill Ambien 5 mg.  And if this pattern of abuse is repeated will stop the medication.   Patient expressed her dysplasia in changing her blood pressure medication.  Though she does not have any side effect of the medication + and blood pressures are much better controlled.  Patient was not able to explain why she was upset with changing blood pressure medication.

## 2020-06-26 DIAGNOSIS — F51.01 PRIMARY INSOMNIA: ICD-10-CM

## 2020-06-26 RX ORDER — ZOLPIDEM TARTRATE 10 MG/1
10 TABLET ORAL NIGHTLY
Qty: 30 TABLET | Refills: 0 | OUTPATIENT
Start: 2020-06-26

## 2020-06-26 NOTE — TELEPHONE ENCOUNTER
----- Message from Kylah Arguelles NP sent at 6/26/2020  3:27 PM CDT -----  Please notify pt I will not fill ambien when she is also prescribed pain medication, I see she was recently given demerol, these 2 meds are not compatible

## 2020-07-23 DIAGNOSIS — F51.01 PRIMARY INSOMNIA: ICD-10-CM

## 2020-07-23 NOTE — TELEPHONE ENCOUNTER
----- Message from Ayaka Sun sent at 7/23/2020  1:33 PM CDT -----  Regarding: refill  Contact: pt  Type:  RX Refill Request    Who Called:pt   Refill or New Rx:refill  RX Name and Strength:Ambien  How is the patient currently taking it? (ex. 1XDay):1xday  Is this a 30 day or 90 day RX:  Preferred Pharmacy with phone number:  [id number] - [address] [address]  Phone: [phone] Fax: [phone]  Local or Mail Order:local  Ordering Provider:  Would the patient rather a call back or a response via [facility]? call  Best Call Back Number:[phone]  Additional Information: n/a

## 2020-07-24 RX ORDER — ZOLPIDEM TARTRATE 5 MG/1
5 TABLET ORAL NIGHTLY
Qty: 30 TABLET | Refills: 0 | Status: SHIPPED | OUTPATIENT
Start: 2020-07-24 | End: 2020-08-18

## 2020-08-18 ENCOUNTER — OFFICE VISIT (OUTPATIENT)
Dept: INTERNAL MEDICINE | Facility: CLINIC | Age: 50
End: 2020-08-18
Payer: MEDICARE

## 2020-08-18 VITALS
OXYGEN SATURATION: 98 % | WEIGHT: 270 LBS | TEMPERATURE: 98 F | DIASTOLIC BLOOD PRESSURE: 88 MMHG | BODY MASS INDEX: 40.92 KG/M2 | HEART RATE: 68 BPM | HEIGHT: 68 IN | SYSTOLIC BLOOD PRESSURE: 138 MMHG

## 2020-08-18 DIAGNOSIS — K21.9 GASTROESOPHAGEAL REFLUX DISEASE, ESOPHAGITIS PRESENCE NOT SPECIFIED: ICD-10-CM

## 2020-08-18 DIAGNOSIS — I10 ESSENTIAL HYPERTENSION: ICD-10-CM

## 2020-08-18 DIAGNOSIS — F51.01 PRIMARY INSOMNIA: Primary | ICD-10-CM

## 2020-08-18 DIAGNOSIS — R19.7 DIARRHEA, UNSPECIFIED TYPE: ICD-10-CM

## 2020-08-18 DIAGNOSIS — R14.0 BLOATING: ICD-10-CM

## 2020-08-18 PROCEDURE — 99214 OFFICE O/P EST MOD 30 MIN: CPT | Mod: S$GLB,,, | Performed by: NURSE PRACTITIONER

## 2020-08-18 PROCEDURE — 99214 PR OFFICE/OUTPT VISIT, EST, LEVL IV, 30-39 MIN: ICD-10-PCS | Mod: S$GLB,,, | Performed by: NURSE PRACTITIONER

## 2020-08-18 RX ORDER — CLONIDINE HYDROCHLORIDE 0.1 MG/1
0.1 TABLET ORAL DAILY PRN
Qty: 30 TABLET | Refills: 1 | Status: SHIPPED | OUTPATIENT
Start: 2020-08-18 | End: 2020-12-30

## 2020-08-18 RX ORDER — ZOLPIDEM TARTRATE 5 MG/1
5 TABLET ORAL NIGHTLY PRN
Qty: 30 TABLET | Refills: 0 | Status: SHIPPED | OUTPATIENT
Start: 2020-08-18 | End: 2020-11-24 | Stop reason: SDUPTHER

## 2020-08-18 RX ORDER — PANTOPRAZOLE SODIUM 40 MG/1
40 TABLET, DELAYED RELEASE ORAL EVERY MORNING
Qty: 30 TABLET | Refills: 3 | Status: SHIPPED | OUTPATIENT
Start: 2020-08-18 | End: 2020-12-30 | Stop reason: SDUPTHER

## 2020-08-18 NOTE — PROGRESS NOTES
Subjective:       Patient ID: Nancy Sun is a 50 y.o. female.    Chief Complaint: No chief complaint on file.    Patient with h/o HTN and BP seem under good control.     Patient with h/o Hidradenitis cyst in left axilla removed  On 6/16/2020. Patient reports the wound is healing well. Patient reports pain in the axillary area and is prescribed pain medications by the surgeon.     Patient using Ambien for Insomnia. Patient requests refill of Ambien - will give max of 5mg and instructed can NEVER take with pain medication     Pt saw gen surg dameon valles yesterday- said lymphedema inflammation- ordered a machine to help with lymph edema- was put on antibiotic but unsure of what one- she thinks clindamycin  And also got a port put in due to shes a hard stick    Wellness labs utd 4/2020    htn- well controlled- pt states this weekend b/p went to 180/90 due to pain- will give clonidine just to use as prn b/p greater than 160/90    Pt c/o bloating, bleching, and epigastric discomfort - states long standing symptoms, she states shes had 7 hernia repairs with santiago,  and bowel reconstruction surgery with dr denton- will start PPI and refer to GI- denies bloody diarrhea    Pt states utd on colonoscopy states she gets them every 5 years, unsure when last scope was and unsure what doctor did it-    Review of Systems   Constitutional: Negative for activity change, appetite change, chills, diaphoresis, fatigue, fever and unexpected weight change.   HENT: Negative for congestion, ear pain, postnasal drip, rhinorrhea, sinus pressure, sinus pain and sore throat.    Respiratory: Negative for apnea, cough, chest tightness, shortness of breath and wheezing.    Cardiovascular: Negative for chest pain, palpitations and leg swelling.   Gastrointestinal: Positive for abdominal distention. Negative for abdominal pain, blood in stool, constipation, diarrhea, nausea and vomiting.   Endocrine: Negative for cold intolerance, heat  intolerance, polydipsia and polyuria.   Genitourinary: Negative for difficulty urinating, dysuria, frequency, hematuria and urgency.   Musculoskeletal: Negative for arthralgias, back pain and myalgias.   Skin:        Healed port wound to right chest   Allergic/Immunologic: Negative for environmental allergies.   Neurological: Negative for dizziness, tremors, seizures, syncope, weakness, light-headedness and numbness.   Hematological: Does not bruise/bleed easily.   Psychiatric/Behavioral: Positive for sleep disturbance. Negative for agitation and suicidal ideas. The patient is not nervous/anxious.        Objective:      Physical Exam  Vitals signs and nursing note reviewed.   Constitutional:       Appearance: She is well-developed.   HENT:      Head: Normocephalic.   Eyes:      General:         Right eye: No discharge.         Left eye: No discharge.      Pupils: Pupils are equal, round, and reactive to light.   Cardiovascular:      Rate and Rhythm: Normal rate and regular rhythm.      Heart sounds: Normal heart sounds. No murmur.   Pulmonary:      Effort: Pulmonary effort is normal.      Breath sounds: Normal breath sounds. No wheezing, rhonchi or rales.   Musculoskeletal: Normal range of motion.   Skin:     General: Skin is warm and dry.      Comments: Healing port wound right anterior chest, no drainage, no erythema, skin intact   Neurological:      Mental Status: She is alert and oriented to person, place, and time.   Psychiatric:         Mood and Affect: Mood normal.         Behavior: Behavior normal.         Thought Content: Thought content normal.         Judgment: Judgment normal.         Assessment:       No diagnosis found.   Plan:       1. Primary insomnia  zolpidem (AMBIEN) 5 MG Tab   2. Gastroesophageal reflux disease, esophagitis presence not specified  Ambulatory referral/consult to Gastroenterology    pantoprazole (PROTONIX) 40 MG tablet   3. Bloating  Ambulatory referral/consult to Gastroenterology    4. Diarrhea, unspecified type  Ambulatory referral/consult to Gastroenterology   5. Essential hypertension  cloNIDine (CATAPRES) 0.1 MG tablet   follow up 3 months

## 2020-09-25 ENCOUNTER — HOSPITAL ENCOUNTER (EMERGENCY)
Facility: OTHER | Age: 50
Discharge: HOME | End: 2020-09-25
Attending: EMERGENCY MEDICINE
Payer: MEDICARE

## 2020-09-25 VITALS
RESPIRATION RATE: 19 BRPM | WEIGHT: 270 LBS | TEMPERATURE: 98 F | HEIGHT: 68 IN | HEART RATE: 75 BPM | OXYGEN SATURATION: 100 % | SYSTOLIC BLOOD PRESSURE: 149 MMHG | BODY MASS INDEX: 40.92 KG/M2 | DIASTOLIC BLOOD PRESSURE: 88 MMHG

## 2020-09-25 DIAGNOSIS — R07.9 CHEST PAIN: Primary | ICD-10-CM

## 2020-09-25 DIAGNOSIS — R07.89 CHEST WALL PAIN: ICD-10-CM

## 2020-09-25 LAB
ALBUMIN SERPL BCP-MCNC: 3.6 G/DL (ref 3.5–5.2)
ALP SERPL-CCNC: 120 U/L (ref 55–135)
ALT SERPL W/O P-5'-P-CCNC: 22 U/L (ref 10–44)
ANION GAP SERPL CALC-SCNC: 9 MMOL/L (ref 8–16)
AST SERPL-CCNC: 19 U/L (ref 10–40)
BASOPHILS # BLD AUTO: 0.04 K/UL (ref 0–0.2)
BASOPHILS NFR BLD: 0.6 % (ref 0–1.9)
BILIRUB SERPL-MCNC: 0.6 MG/DL (ref 0.1–1)
BNP SERPL-MCNC: <10 PG/ML (ref 0–99)
BUN SERPL-MCNC: 11 MG/DL (ref 6–20)
CALCIUM SERPL-MCNC: 9 MG/DL (ref 8.7–10.5)
CHLORIDE SERPL-SCNC: 107 MMOL/L (ref 95–110)
CO2 SERPL-SCNC: 26 MMOL/L (ref 23–29)
CREAT SERPL-MCNC: 0.6 MG/DL (ref 0.5–1.4)
DIFFERENTIAL METHOD: ABNORMAL
EOSINOPHIL # BLD AUTO: 0.2 K/UL (ref 0–0.5)
EOSINOPHIL NFR BLD: 2.9 % (ref 0–8)
ERYTHROCYTE [DISTWIDTH] IN BLOOD BY AUTOMATED COUNT: 13.8 % (ref 11.5–14.5)
EST. GFR  (AFRICAN AMERICAN): >60 ML/MIN/1.73 M^2
EST. GFR  (NON AFRICAN AMERICAN): >60 ML/MIN/1.73 M^2
GLUCOSE SERPL-MCNC: 92 MG/DL (ref 70–110)
HCT VFR BLD AUTO: 36.1 % (ref 37–48.5)
HGB BLD-MCNC: 11.3 G/DL (ref 12–16)
IMM GRANULOCYTES # BLD AUTO: 0.03 K/UL (ref 0–0.04)
IMM GRANULOCYTES NFR BLD AUTO: 0.4 % (ref 0–0.5)
LYMPHOCYTES # BLD AUTO: 2.2 K/UL (ref 1–4.8)
LYMPHOCYTES NFR BLD: 31.3 % (ref 18–48)
MAGNESIUM SERPL-MCNC: 2 MG/DL (ref 1.6–2.6)
MCH RBC QN AUTO: 28.3 PG (ref 27–31)
MCHC RBC AUTO-ENTMCNC: 31.3 G/DL (ref 32–36)
MCV RBC AUTO: 91 FL (ref 82–98)
MONOCYTES # BLD AUTO: 0.6 K/UL (ref 0.3–1)
MONOCYTES NFR BLD: 8 % (ref 4–15)
NEUTROPHILS # BLD AUTO: 4 K/UL (ref 1.8–7.7)
NEUTROPHILS NFR BLD: 56.8 % (ref 38–73)
NRBC BLD-RTO: 0 /100 WBC
PLATELET # BLD AUTO: 231 K/UL (ref 150–350)
PMV BLD AUTO: 10 FL (ref 9.2–12.9)
POTASSIUM SERPL-SCNC: 4.1 MMOL/L (ref 3.5–5.1)
PROT SERPL-MCNC: 7.4 G/DL (ref 6–8.4)
RBC # BLD AUTO: 3.99 M/UL (ref 4–5.4)
SODIUM SERPL-SCNC: 142 MMOL/L (ref 136–145)
TROPONIN I SERPL DL<=0.01 NG/ML-MCNC: <0.006 NG/ML (ref 0–0.03)
WBC # BLD AUTO: 7 K/UL (ref 3.9–12.7)

## 2020-09-25 PROCEDURE — 93010 EKG 12-LEAD: ICD-10-PCS | Mod: ,,, | Performed by: INTERNAL MEDICINE

## 2020-09-25 PROCEDURE — 93010 ELECTROCARDIOGRAM REPORT: CPT | Mod: ,,, | Performed by: INTERNAL MEDICINE

## 2020-09-25 PROCEDURE — 93005 ELECTROCARDIOGRAM TRACING: CPT

## 2020-09-25 PROCEDURE — 85025 COMPLETE CBC W/AUTO DIFF WBC: CPT

## 2020-09-25 PROCEDURE — 83880 ASSAY OF NATRIURETIC PEPTIDE: CPT

## 2020-09-25 PROCEDURE — 99285 EMERGENCY DEPT VISIT HI MDM: CPT | Mod: 25

## 2020-09-25 PROCEDURE — 80053 COMPREHEN METABOLIC PANEL: CPT

## 2020-09-25 PROCEDURE — 63600175 PHARM REV CODE 636 W HCPCS: Performed by: EMERGENCY MEDICINE

## 2020-09-25 PROCEDURE — 25000003 PHARM REV CODE 250: Performed by: EMERGENCY MEDICINE

## 2020-09-25 PROCEDURE — 84484 ASSAY OF TROPONIN QUANT: CPT

## 2020-09-25 PROCEDURE — 83735 ASSAY OF MAGNESIUM: CPT

## 2020-09-25 RX ORDER — HEPARIN SODIUM (PORCINE) LOCK FLUSH IV SOLN 100 UNIT/ML 100 UNIT/ML
100 SOLUTION INTRAVENOUS ONCE
Status: COMPLETED | OUTPATIENT
Start: 2020-09-25 | End: 2020-09-25

## 2020-09-25 RX ORDER — ASPIRIN 325 MG
325 TABLET ORAL
Status: COMPLETED | OUTPATIENT
Start: 2020-09-25 | End: 2020-09-25

## 2020-09-25 RX ADMIN — HEPARIN SODIUM (PORCINE) LOCK FLUSH IV SOLN 100 UNIT/ML 100 UNITS: 100 SOLUTION at 02:09

## 2020-09-25 RX ADMIN — ASPIRIN 325 MG ORAL TABLET 325 MG: 325 PILL ORAL at 12:09

## 2020-09-25 NOTE — ED NOTES
Pt presents to ED c/o chest pain x several days. Denies fevers, chills, coughing. Hx of lymphedema to LUE. Limb alert applied. Pt AAOx4 and appropriate at this time. Respirations even and unlabored. No acute distress noted.   Pt updated on POC. Bed is locked and in lowest position with side rails up x2. Call bell within reach and pt oriented to use of call bell.

## 2020-09-25 NOTE — ED NOTES
Appearance: Pt awake, alert & oriented to person, place & time. Pt in no acute distress at present time. Pt is clean and well groomed with clothes appropriately fastened.   Skin: Skin warm, dry & intact. Color consistent with ethnicity. Mucous membranes moist. No breakdown or brusing noted.   Musculoskeletal: Patient moving all extremities well, no obvious swelling or deformities noted.   Respiratory: Respirations spontaneous, even, and non-labored. Visible chest rise noted. Airway is open and patent. No accessory muscle use noted.   Neurologic: Sensation is intact. Speech is clear and appropriate. Eyes open spontaneously, behavior appropriate to situation, follows commands, facial expression symmetrical, bilateral hand grasp equal and even, purposeful motor response noted.  Cardiac: All peripheral pulses present. No Bilateral lower extremity edema. Cap refill is <3 seconds. Pt denies SOB, dizziness, blurred vision, weakness or fatigue at this time. + mid sternal CP reported.   Abdomen:  Pt denies active abd pains, cramping or discomfort, No N/V/D at this time.

## 2020-09-25 NOTE — ED NOTES
Accessed pts port a cath to RIGHT anterior chest, per MD verbal order. Pt has power point per her reports. Used 0.75 in power port needle. + blood return noted after access. Pt tolerated well.
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
## 2020-09-25 NOTE — ED NOTES
De-accessed pt port a cath from right anterior chest, flushed with 100 cc heparin. Pt tolerated well. Pt AAOx4 and appropriate at this time. Respirations even and unlabored. No acute distress noted.

## 2020-11-18 ENCOUNTER — TELEPHONE (OUTPATIENT)
Dept: PRIMARY CARE CLINIC | Facility: CLINIC | Age: 50
End: 2020-11-18

## 2020-11-18 NOTE — TELEPHONE ENCOUNTER
----- Message from Gloria Carpenter sent at 11/18/2020  9:19 AM CST -----  Type:  Needs Medical Advice    Who Called: pt   Symptoms (please be specific): water leaking from nose    How long has patient had these symptoms:  a week   Pharmacy name and phone #:    Would the patient rather a call back or a response via [facility]? Callback   Best Call Back Number: [phone]   Additional Information:

## 2020-11-18 NOTE — TELEPHONE ENCOUNTER
Pt stated shes having a lot of water drainage from nose for 4 days now even while asleep...  Pt stated she had sinus surgery about 1-2 yrs ago but dr le has retired..       Pt notified to use flonase

## 2020-11-24 ENCOUNTER — OFFICE VISIT (OUTPATIENT)
Dept: PRIMARY CARE CLINIC | Facility: CLINIC | Age: 50
End: 2020-11-24
Payer: MEDICARE

## 2020-11-24 VITALS
HEART RATE: 81 BPM | HEIGHT: 68 IN | TEMPERATURE: 97 F | BODY MASS INDEX: 41.65 KG/M2 | DIASTOLIC BLOOD PRESSURE: 86 MMHG | WEIGHT: 274.81 LBS | OXYGEN SATURATION: 97 % | SYSTOLIC BLOOD PRESSURE: 145 MMHG

## 2020-11-24 DIAGNOSIS — E66.01 MORBID OBESITY: ICD-10-CM

## 2020-11-24 DIAGNOSIS — I10 ESSENTIAL HYPERTENSION: Primary | ICD-10-CM

## 2020-11-24 DIAGNOSIS — F51.01 PRIMARY INSOMNIA: ICD-10-CM

## 2020-11-24 PROCEDURE — 99214 OFFICE O/P EST MOD 30 MIN: CPT | Mod: S$GLB,,, | Performed by: INTERNAL MEDICINE

## 2020-11-24 PROCEDURE — 99214 PR OFFICE/OUTPT VISIT, EST, LEVL IV, 30-39 MIN: ICD-10-PCS | Mod: S$GLB,,, | Performed by: INTERNAL MEDICINE

## 2020-11-24 RX ORDER — CARVEDILOL 6.25 MG/1
6.25 TABLET ORAL 2 TIMES DAILY WITH MEALS
Qty: 60 TABLET | Refills: 11 | Status: SHIPPED | OUTPATIENT
Start: 2020-11-24 | End: 2021-05-14

## 2020-11-24 RX ORDER — HYDROCHLOROTHIAZIDE 25 MG/1
25 TABLET ORAL DAILY
Qty: 90 TABLET | Refills: 1 | Status: SHIPPED | OUTPATIENT
Start: 2020-11-24 | End: 2021-12-03 | Stop reason: SDUPTHER

## 2020-11-24 RX ORDER — AMLODIPINE BESYLATE 10 MG/1
10 TABLET ORAL DAILY
Qty: 90 TABLET | Refills: 1 | Status: SHIPPED | OUTPATIENT
Start: 2020-11-24 | End: 2021-12-03 | Stop reason: SDUPTHER

## 2020-11-24 RX ORDER — ZOLPIDEM TARTRATE 5 MG/1
5 TABLET ORAL NIGHTLY PRN
Qty: 30 TABLET | Refills: 0 | Status: SHIPPED | OUTPATIENT
Start: 2020-11-24 | End: 2020-12-23 | Stop reason: SDUPTHER

## 2020-11-24 NOTE — PROGRESS NOTES
Subjective:      Patient ID: Nancy Sun is a 50 y.o. female.    Chief Complaint: Follow-up    HPI: Patient with h/o HTN and BP seem to be running high.  Patient reports compliance with medication and diet.        Patient using Ambien for Insomnia.  Patient goes to blood at 9 and wakes up at 6 or 7:00 a.m. patient denies watching TV while in bed.  Patient requests refill of Ambien.       Review of Systems   Constitutional: Negative for chills, diaphoresis, fever, malaise/fatigue and weight loss.   HENT: Negative for congestion, ear pain, sinus pain, sore throat and tinnitus.    Eyes: Negative for blurred vision and photophobia.   Respiratory: Negative for cough, hemoptysis, shortness of breath and wheezing.    Cardiovascular: Negative for chest pain, palpitations, orthopnea, leg swelling and PND.   Gastrointestinal: Negative for abdominal pain, blood in stool, constipation, diarrhea, heartburn, melena, nausea and vomiting.   Genitourinary: Negative for dysuria, frequency and urgency.   Musculoskeletal: Negative for back pain, myalgias and neck pain.   Skin: Negative for rash.   Neurological: Negative for dizziness, tremors, seizures, loss of consciousness and weakness.   Endo/Heme/Allergies: Negative for polydipsia.   Psychiatric/Behavioral: Negative for depression and hallucinations. The patient has insomnia.      Objective:     Physical Exam  Constitutional:       General: She is not in acute distress.     Appearance: She is not diaphoretic.   Neck:      Thyroid: No thyromegaly.   Cardiovascular:      Rate and Rhythm: Normal rate and regular rhythm.      Heart sounds: Normal heart sounds. No murmur.   Pulmonary:      Effort: Pulmonary effort is normal. No respiratory distress.      Breath sounds: Normal breath sounds. No wheezing.   Abdominal:      General: Bowel sounds are normal. There is no distension.      Palpations: Abdomen is soft.      Tenderness: There is no abdominal tenderness.   Lymphadenopathy:       Cervical: No cervical adenopathy.   Neurological:      Mental Status: She is alert and oriented to person, place, and time.   Psychiatric:         Behavior: Behavior normal.         Thought Content: Thought content normal.         Judgment: Judgment normal.       Assessment:       ICD-10-CM ICD-9-CM   1. Essential hypertension  I10 401.9   2. Primary insomnia  F51.01 307.42   3. Morbid obesity  E66.01 278.01       Plan:   Patient blood pressures are running high will add carvedilol  Consult patient about need to adhere with diet and minimize salt intake  Patient is staying in bed more than 10 hr and is waking up at different times.  Consult patient about sleep hygiene.  Consuled patient about need to lose weight + minimize carbohydrate intake.    Medication List with Changes/Refills   New Medications    CARVEDILOL (COREG) 6.25 MG TABLET    Take 1 tablet (6.25 mg total) by mouth 2 (two) times daily with meals.   Current Medications    CLONIDINE (CATAPRES) 0.1 MG TABLET    Take 1 tablet (0.1 mg total) by mouth daily as needed (for blood pressure over 160/90).    PANTOPRAZOLE (PROTONIX) 40 MG TABLET    Take 1 tablet (40 mg total) by mouth every morning.    PROAIR HFA 90 MCG/ACTUATION INHALER    USE 1 PUFF PO BID    VARICELLA-ZOSTER GE-AS01B, PF, (SHINGRIX, PF,) 50 MCG/0.5 ML INJECTION    Administer two doses 2 months apart   Changed and/or Refilled Medications    Modified Medication Previous Medication    AMLODIPINE (NORVASC) 10 MG TABLET amLODIPine (NORVASC) 10 MG tablet       Take 1 tablet (10 mg total) by mouth once daily.    TAKE ONE TABLET BY MOUTH ONCE DAILY    HYDROCHLOROTHIAZIDE (HYDRODIURIL) 25 MG TABLET hydroCHLOROthiazide (HYDRODIURIL) 25 MG tablet       Take 1 tablet (25 mg total) by mouth once daily.    Take 1 tablet (25 mg total) by mouth once daily.    ZOLPIDEM (AMBIEN) 5 MG TAB zolpidem (AMBIEN) 5 MG Tab       Take 1 tablet (5 mg total) by mouth nightly as needed.    Take 1 tablet (5 mg total) by  mouth nightly as needed.

## 2020-12-23 DIAGNOSIS — F51.01 PRIMARY INSOMNIA: ICD-10-CM

## 2020-12-23 RX ORDER — ZOLPIDEM TARTRATE 5 MG/1
5 TABLET ORAL NIGHTLY PRN
Qty: 30 TABLET | Refills: 0 | Status: SHIPPED | OUTPATIENT
Start: 2020-12-23 | End: 2021-01-14 | Stop reason: SDUPTHER

## 2020-12-23 NOTE — TELEPHONE ENCOUNTER
----- Message from Luan Berg sent at 12/23/2020 10:21 AM CST -----  Regarding: Refill  Type:  RX Refill Request    Who Called: Nancy  Refill or New Rx:Refill  RX Name and Strength:ambien 10mg  How is the patient currently taking it? (ex. 1XDay):1xday  Is this a 30 day or 90 day Rx:30  Preferred Pharmacy with phone number:  [id number] [address] [address]  Phone: [phone] Fax: [phone]  Local or Mail Order:Local  Ordering Provider:Dr. Hanna  Would the patient rather a call back or a response via [facility]? Call back  Best Call Back Number:[phone] (home)   Additional Information: Pt also needs something for acid reflux.

## 2020-12-30 ENCOUNTER — OFFICE VISIT (OUTPATIENT)
Dept: PRIMARY CARE CLINIC | Facility: CLINIC | Age: 50
End: 2020-12-30
Payer: MEDICARE

## 2020-12-30 ENCOUNTER — TELEPHONE (OUTPATIENT)
Dept: PRIMARY CARE CLINIC | Facility: CLINIC | Age: 50
End: 2020-12-30

## 2020-12-30 VITALS
TEMPERATURE: 98 F | WEIGHT: 271 LBS | BODY MASS INDEX: 41.07 KG/M2 | RESPIRATION RATE: 16 BRPM | OXYGEN SATURATION: 97 % | DIASTOLIC BLOOD PRESSURE: 76 MMHG | HEIGHT: 68 IN | HEART RATE: 72 BPM | SYSTOLIC BLOOD PRESSURE: 112 MMHG

## 2020-12-30 DIAGNOSIS — K21.9 GASTROESOPHAGEAL REFLUX DISEASE: Primary | ICD-10-CM

## 2020-12-30 DIAGNOSIS — J44.9 CHRONIC OBSTRUCTIVE PULMONARY DISEASE, UNSPECIFIED COPD TYPE: ICD-10-CM

## 2020-12-30 DIAGNOSIS — J44.9 CHRONIC OBSTRUCTIVE PULMONARY DISEASE, UNSPECIFIED COPD TYPE: Primary | ICD-10-CM

## 2020-12-30 DIAGNOSIS — E66.01 MORBID OBESITY: ICD-10-CM

## 2020-12-30 DIAGNOSIS — Z11.59 NEED FOR HEPATITIS C SCREENING TEST: ICD-10-CM

## 2020-12-30 DIAGNOSIS — I10 ESSENTIAL HYPERTENSION: ICD-10-CM

## 2020-12-30 PROCEDURE — 99214 PR OFFICE/OUTPT VISIT, EST, LEVL IV, 30-39 MIN: ICD-10-PCS | Mod: S$GLB,,, | Performed by: INTERNAL MEDICINE

## 2020-12-30 PROCEDURE — 99214 OFFICE O/P EST MOD 30 MIN: CPT | Mod: S$GLB,,, | Performed by: INTERNAL MEDICINE

## 2020-12-30 RX ORDER — ALBUTEROL SULFATE 90 UG/1
AEROSOL, METERED RESPIRATORY (INHALATION)
Qty: 18 G | Refills: 2 | Status: SHIPPED | OUTPATIENT
Start: 2020-12-30 | End: 2020-12-30

## 2020-12-30 RX ORDER — PANTOPRAZOLE SODIUM 40 MG/1
40 TABLET, DELAYED RELEASE ORAL EVERY MORNING
Qty: 30 TABLET | Refills: 3 | Status: SHIPPED | OUTPATIENT
Start: 2020-12-30 | End: 2021-05-14 | Stop reason: SDUPTHER

## 2020-12-30 RX ORDER — ALBUTEROL SULFATE 90 UG/1
2 POWDER, METERED RESPIRATORY (INHALATION) EVERY 4 HOURS PRN
Qty: 1 EACH | Refills: 11 | Status: SHIPPED | OUTPATIENT
Start: 2020-12-30 | End: 2021-06-29 | Stop reason: RX

## 2020-12-30 NOTE — PROGRESS NOTES
Subjective:      Patient ID: Nancy Sun is a 50 y.o. female.    Chief Complaint: Gastroesophageal Reflux (really bad)    Patient with h/o HTN and BP seem under good control.  Patient reports compliance with medication and diet.      Patient using Ambien for Insomnia.  Patient goes to blood at 9 and wakes up at 6 or 7:00 a.m. patient denies watching TV while in bed.  Patient requests refill of Ambien.     Patient today complains of GERD x long.  Patient takes PPI with some help.  Patient is off of medication for some time.     Patient has history of mild COPD that is improved/controlled with ProAir.        Review of Systems   Constitutional: Positive for weight loss (3 lb weight loss). Negative for chills, diaphoresis, fever and malaise/fatigue.   HENT: Negative for congestion, ear pain, sinus pain, sore throat and tinnitus.    Eyes: Negative for blurred vision and photophobia.   Respiratory: Negative for cough, hemoptysis, shortness of breath and wheezing.    Cardiovascular: Negative for chest pain, palpitations, orthopnea, leg swelling and PND.   Gastrointestinal: Positive for heartburn. Negative for abdominal pain, blood in stool, constipation, diarrhea, melena, nausea and vomiting.   Genitourinary: Negative for dysuria, frequency and urgency.   Musculoskeletal: Negative for back pain, myalgias and neck pain.   Skin: Negative for rash.   Neurological: Negative for dizziness, tremors, seizures, loss of consciousness and weakness.   Endo/Heme/Allergies: Negative for polydipsia.   Psychiatric/Behavioral: Negative for depression and hallucinations. The patient has insomnia.      Objective:     Physical Exam  Constitutional:       General: She is not in acute distress.     Appearance: She is not diaphoretic.   Neck:      Thyroid: No thyromegaly.   Cardiovascular:      Rate and Rhythm: Normal rate and regular rhythm.      Heart sounds: Normal heart sounds. No murmur.   Pulmonary:      Effort: Pulmonary effort is  normal. No respiratory distress.      Breath sounds: Normal breath sounds. No wheezing.   Abdominal:      General: Bowel sounds are normal. There is no distension.      Palpations: Abdomen is soft.      Tenderness: There is no abdominal tenderness.   Lymphadenopathy:      Cervical: No cervical adenopathy.   Neurological:      Mental Status: She is alert and oriented to person, place, and time.   Psychiatric:         Behavior: Behavior normal.         Thought Content: Thought content normal.         Judgment: Judgment normal.       Assessment:       ICD-10-CM ICD-9-CM   1. Gastroesophageal reflux disease  K21.9 530.81   2. Chronic obstructive pulmonary disease, unspecified COPD type  J44.9 496   3. Need for hepatitis C screening test  Z11.59 V73.89   4. Essential hypertension  I10 401.9   5. Morbid obesity  E66.01 278.01       Plan:     Patient with history of GERD.  Will restart PPI.  Will also check for H pylori.  Patient has mild COPD that is improved with ProAir.  Will continue medication  Patient blood pressures are under good control with medication.  Will stop clonidine  Patient was taking clonidine as needed for really high blood pressure  Patient is very active + uses bike for her transportation.  Bikes couple of miles a day.  Advised patient about minimizing carbohydrate intake, avoiding juices, avoiding snacks between meals  Patient is losing weight slowly..  Patient is encouraged    Medication List with Changes/Refills   Current Medications    AMLODIPINE (NORVASC) 10 MG TABLET    Take 1 tablet (10 mg total) by mouth once daily.    CARVEDILOL (COREG) 6.25 MG TABLET    Take 1 tablet (6.25 mg total) by mouth 2 (two) times daily with meals.    HYDROCHLOROTHIAZIDE (HYDRODIURIL) 25 MG TABLET    Take 1 tablet (25 mg total) by mouth once daily.    VARICELLA-ZOSTER GE-AS01B, PF, (SHINGRIX, PF,) 50 MCG/0.5 ML INJECTION    Administer two doses 2 months apart    ZOLPIDEM (AMBIEN) 5 MG TAB    Take 1 tablet (5 mg  total) by mouth nightly as needed.   Changed and/or Refilled Medications    Modified Medication Previous Medication    PANTOPRAZOLE (PROTONIX) 40 MG TABLET pantoprazole (PROTONIX) 40 MG tablet       Take 1 tablet (40 mg total) by mouth every morning.    Take 1 tablet (40 mg total) by mouth every morning.    PROAIR HFA 90 MCG/ACTUATION INHALER PROAIR HFA 90 mcg/actuation inhaler       USE 1 PUFF PO BID    USE 1 PUFF PO BID   Discontinued Medications    CLONIDINE (CATAPRES) 0.1 MG TABLET    Take 1 tablet (0.1 mg total) by mouth daily as needed (for blood pressure over 160/90).

## 2020-12-31 LAB
HCV AB S/CO SERPL IA: 0.02
HCV AB SERPL QL IA: NORMAL

## 2021-01-02 LAB — UREA BREATH TEST QL: DETECTED

## 2021-01-14 DIAGNOSIS — F51.01 PRIMARY INSOMNIA: ICD-10-CM

## 2021-01-14 RX ORDER — ZOLPIDEM TARTRATE 5 MG/1
5 TABLET ORAL NIGHTLY PRN
Qty: 30 TABLET | Refills: 0 | Status: SHIPPED | OUTPATIENT
Start: 2021-01-14 | End: 2021-02-18

## 2021-01-19 DIAGNOSIS — A04.8 H. PYLORI INFECTION: Primary | ICD-10-CM

## 2021-01-19 RX ORDER — CLARITHROMYCIN 500 MG/1
500 TABLET, FILM COATED ORAL EVERY 12 HOURS
Qty: 28 TABLET | Refills: 0 | Status: SHIPPED | OUTPATIENT
Start: 2021-01-19 | End: 2021-02-26

## 2021-01-19 RX ORDER — AMOXICILLIN 500 MG/1
TABLET, FILM COATED ORAL
Qty: 56 TABLET | Refills: 0 | Status: SHIPPED | OUTPATIENT
Start: 2021-01-19 | End: 2021-02-26

## 2021-02-26 ENCOUNTER — OFFICE VISIT (OUTPATIENT)
Dept: PRIMARY CARE CLINIC | Facility: CLINIC | Age: 51
End: 2021-02-26
Payer: MEDICARE

## 2021-02-26 VITALS
HEIGHT: 68 IN | TEMPERATURE: 98 F | BODY MASS INDEX: 40.04 KG/M2 | HEART RATE: 65 BPM | DIASTOLIC BLOOD PRESSURE: 87 MMHG | SYSTOLIC BLOOD PRESSURE: 132 MMHG | WEIGHT: 264.19 LBS | OXYGEN SATURATION: 98 %

## 2021-02-26 DIAGNOSIS — E66.01 MORBID OBESITY: ICD-10-CM

## 2021-02-26 DIAGNOSIS — I10 ESSENTIAL HYPERTENSION: ICD-10-CM

## 2021-02-26 DIAGNOSIS — J44.9 CHRONIC OBSTRUCTIVE PULMONARY DISEASE, UNSPECIFIED COPD TYPE: ICD-10-CM

## 2021-02-26 DIAGNOSIS — A04.8 H. PYLORI INFECTION: Primary | ICD-10-CM

## 2021-02-26 PROCEDURE — 99214 PR OFFICE/OUTPT VISIT, EST, LEVL IV, 30-39 MIN: ICD-10-PCS | Mod: S$GLB,,, | Performed by: INTERNAL MEDICINE

## 2021-02-26 PROCEDURE — 99214 OFFICE O/P EST MOD 30 MIN: CPT | Mod: S$GLB,,, | Performed by: INTERNAL MEDICINE

## 2021-04-21 DIAGNOSIS — F51.01 PRIMARY INSOMNIA: ICD-10-CM

## 2021-04-21 RX ORDER — ZOLPIDEM TARTRATE 5 MG/1
5 TABLET ORAL NIGHTLY PRN
Qty: 30 TABLET | Refills: 0 | Status: SHIPPED | OUTPATIENT
Start: 2021-04-21 | End: 2021-05-18

## 2021-05-13 ENCOUNTER — TELEPHONE (OUTPATIENT)
Dept: PRIMARY CARE CLINIC | Facility: CLINIC | Age: 51
End: 2021-05-13

## 2021-05-14 ENCOUNTER — OFFICE VISIT (OUTPATIENT)
Dept: PRIMARY CARE CLINIC | Facility: CLINIC | Age: 51
End: 2021-05-14
Payer: MEDICARE

## 2021-05-14 VITALS
SYSTOLIC BLOOD PRESSURE: 142 MMHG | OXYGEN SATURATION: 99 % | HEART RATE: 87 BPM | HEIGHT: 68 IN | WEIGHT: 269 LBS | BODY MASS INDEX: 40.77 KG/M2 | RESPIRATION RATE: 18 BRPM | DIASTOLIC BLOOD PRESSURE: 100 MMHG

## 2021-05-14 DIAGNOSIS — K21.00 GASTROESOPHAGEAL REFLUX DISEASE WITH ESOPHAGITIS WITHOUT HEMORRHAGE: ICD-10-CM

## 2021-05-14 DIAGNOSIS — I10 ESSENTIAL HYPERTENSION: Primary | ICD-10-CM

## 2021-05-14 PROCEDURE — 99213 OFFICE O/P EST LOW 20 MIN: CPT | Mod: S$GLB,,, | Performed by: NURSE PRACTITIONER

## 2021-05-14 PROCEDURE — 99213 PR OFFICE/OUTPT VISIT, EST, LEVL III, 20-29 MIN: ICD-10-PCS | Mod: S$GLB,,, | Performed by: NURSE PRACTITIONER

## 2021-05-14 RX ORDER — CARVEDILOL 12.5 MG/1
12.5 TABLET ORAL 2 TIMES DAILY WITH MEALS
Qty: 60 TABLET | Refills: 1 | Status: SHIPPED | OUTPATIENT
Start: 2021-05-14 | End: 2021-06-29

## 2021-05-14 RX ORDER — PANTOPRAZOLE SODIUM 40 MG/1
40 TABLET, DELAYED RELEASE ORAL EVERY MORNING
Qty: 30 TABLET | Refills: 3 | Status: SHIPPED | OUTPATIENT
Start: 2021-05-14 | End: 2021-08-19

## 2021-05-26 ENCOUNTER — OFFICE VISIT (OUTPATIENT)
Dept: PRIMARY CARE CLINIC | Facility: CLINIC | Age: 51
End: 2021-05-26
Payer: MEDICARE

## 2021-05-26 VITALS
HEIGHT: 68 IN | OXYGEN SATURATION: 97 % | TEMPERATURE: 98 F | WEIGHT: 275 LBS | RESPIRATION RATE: 16 BRPM | SYSTOLIC BLOOD PRESSURE: 126 MMHG | BODY MASS INDEX: 41.68 KG/M2 | HEART RATE: 79 BPM | DIASTOLIC BLOOD PRESSURE: 83 MMHG

## 2021-05-26 DIAGNOSIS — I10 ESSENTIAL HYPERTENSION: Primary | ICD-10-CM

## 2021-05-26 DIAGNOSIS — E66.01 MORBID OBESITY: ICD-10-CM

## 2021-05-26 DIAGNOSIS — J44.9 CHRONIC OBSTRUCTIVE PULMONARY DISEASE, UNSPECIFIED COPD TYPE: ICD-10-CM

## 2021-05-26 DIAGNOSIS — R09.81 NASAL CONGESTION: ICD-10-CM

## 2021-05-26 PROCEDURE — 99214 PR OFFICE/OUTPT VISIT, EST, LEVL IV, 30-39 MIN: ICD-10-PCS | Mod: S$GLB,,, | Performed by: INTERNAL MEDICINE

## 2021-05-26 PROCEDURE — 99214 OFFICE O/P EST MOD 30 MIN: CPT | Mod: S$GLB,,, | Performed by: INTERNAL MEDICINE

## 2021-05-26 RX ORDER — FLUTICASONE PROPIONATE 50 MCG
1 SPRAY, SUSPENSION (ML) NASAL DAILY
Qty: 16 G | Refills: 2 | Status: SHIPPED | OUTPATIENT
Start: 2021-05-26 | End: 2021-12-03

## 2021-05-28 LAB
ALBUMIN SERPL BCP-MCNC: 3.6 G/DL (ref 3.4–5)
ALBUMIN/GLOBULIN RATIO: 0.77 RATIO (ref 1.1–1.8)
ALP SERPL-CCNC: 169 U/L (ref 46–116)
ALT SERPL W P-5'-P-CCNC: 46 U/L (ref 12–78)
ANION GAP SERPL CALC-SCNC: 5 MMOL/L (ref 3–11)
AST SERPL-CCNC: 21 U/L (ref 15–37)
BASOPHILS NFR BLD: 0.6 % (ref 0–3)
BILIRUB SERPL-MCNC: 0.4 MG/DL (ref 0–1)
BUN SERPL-MCNC: 12 MG/DL (ref 7–18)
BUN/CREAT SERPL: 18.46 RATIO (ref 7–18)
CALCIUM SERPL-MCNC: 9.1 MG/DL (ref 8.8–10.5)
CHLORIDE SERPL-SCNC: 106 MMOL/L (ref 100–108)
CHOLEST SERPL-MSCNC: 149 MG/DL
CO2 SERPL-SCNC: 32 MMOL/L (ref 21–32)
CREAT SERPL-MCNC: 0.65 MG/DL (ref 0.55–1.02)
EOSINOPHIL NFR BLD: 2.7 % (ref 1–3)
ERYTHROCYTE [DISTWIDTH] IN BLOOD BY AUTOMATED COUNT: 13.5 % (ref 12.5–18)
GFR ESTIMATION: > 60
GLOBULIN: 4.7 G/DL (ref 2.3–3.5)
GLUCOSE SERPL-MCNC: 100 MG/DL (ref 70–110)
HCT VFR BLD AUTO: 39.4 % (ref 37–47)
HDL/CHOLESTEROL RATIO: 2.7 RATIO
HDLC SERPL-MCNC: 55 MG/DL (ref 39–96)
HGB BLD-MCNC: 12.1 G/DL (ref 12–16)
HYPOCHROMIA BLD QL SMEAR: NORMAL
LDLC SERPL CALC-MCNC: 69.4 MG/DL
LYMPHOCYTES NFR BLD: 33.7 % (ref 25–40)
MCH RBC QN AUTO: 28.5 PG (ref 27–31.2)
MCHC RBC AUTO-ENTMCNC: 30.7 G/DL (ref 31.8–35.4)
MCV RBC AUTO: 92.9 FL (ref 80–97)
MONOCYTES NFR BLD: 8.5 % (ref 1–15)
NEUTROPHILS # BLD AUTO: 3.6 10*3/UL (ref 1.8–7.7)
NEUTROPHILS NFR BLD: 54.1 % (ref 37–80)
NUCLEATED RED BLOOD CELLS: 0 %
PLATELETS: 213 10*3/UL (ref 142–424)
POTASSIUM SERPL-SCNC: 4.1 MMOL/L (ref 3.6–5.2)
PROT SERPL-MCNC: 8.3 G/DL (ref 6.4–8.2)
RBC # BLD AUTO: 4.24 10*6/UL (ref 4.2–5.4)
SODIUM BLD-SCNC: 143 MMOL/L (ref 135–145)
TRIGL SERPL-MCNC: 123 MG/DL (ref 30–200)
TSH SERPL DL<=0.005 MIU/L-ACNC: 1.62 UIU/ML (ref 0.36–3.74)
VLDL CHOLESTEROL: 25 MG/DL (ref 0–40)
WBC # BLD: 6.7 10*3/UL (ref 4.6–10.2)

## 2021-05-31 DIAGNOSIS — D89.2 HYPERGAMMAGLOBULINEMIA: Primary | ICD-10-CM

## 2021-05-31 DIAGNOSIS — D50.8 HYPOCHROMIC ERYTHROCYTES: ICD-10-CM

## 2021-06-01 LAB
Lab: ABNORMAL
METANEPHRINE FREE PLASMA: 0.18 NMOL/L (ref 0–0.49)
NORMETANEPHRINE FREE PLASMA: 1.05 NMOL/L (ref 0–0.89)

## 2021-06-04 DIAGNOSIS — I10 ESSENTIAL HYPERTENSION: Primary | ICD-10-CM

## 2021-06-14 DIAGNOSIS — F51.01 PRIMARY INSOMNIA: ICD-10-CM

## 2021-06-15 RX ORDER — ZOLPIDEM TARTRATE 5 MG/1
5 TABLET ORAL NIGHTLY PRN
Qty: 30 TABLET | Refills: 0 | Status: SHIPPED | OUTPATIENT
Start: 2021-06-15 | End: 2021-07-23 | Stop reason: SDUPTHER

## 2021-06-21 LAB
FERRITIN SERPL-MCNC: 78 NG/ML (ref 8–388)
IRON: 40 UG/DL (ref 26–170)
TOTAL IRON BINDING CAPACITY: 318 UG/DL (ref 250–450)

## 2021-06-22 ENCOUNTER — TELEPHONE (OUTPATIENT)
Dept: PRIMARY CARE CLINIC | Facility: CLINIC | Age: 51
End: 2021-06-22

## 2021-06-23 LAB
INTERPRETATION UR IFE-IMP: ABNORMAL
Lab: ABNORMAL HR
TOTAL PROTEIN: ABNORMAL
URINE FREE KAPPA EXCRETION/DAY: ABNORMAL MG/D
URINE FREE KAPPA LIGHT CHAINS: 54.45 MG/L (ref 0–32.9)
URINE FREE LAMBDA EXCRETION/DAY: ABNORMAL MG/D
URINE FREE LAMBDA LIGHT CHAINS: 3.97 MG/L (ref 0–3.79)
VOLUME: ABNORMAL ML

## 2021-06-29 ENCOUNTER — OFFICE VISIT (OUTPATIENT)
Dept: PRIMARY CARE CLINIC | Facility: CLINIC | Age: 51
End: 2021-06-29
Payer: MEDICARE

## 2021-06-29 ENCOUNTER — TELEPHONE (OUTPATIENT)
Dept: HEMATOLOGY/ONCOLOGY | Facility: CLINIC | Age: 51
End: 2021-06-29

## 2021-06-29 VITALS
HEIGHT: 68 IN | DIASTOLIC BLOOD PRESSURE: 99 MMHG | BODY MASS INDEX: 42.38 KG/M2 | SYSTOLIC BLOOD PRESSURE: 155 MMHG | WEIGHT: 279.63 LBS | TEMPERATURE: 98 F | HEART RATE: 84 BPM | OXYGEN SATURATION: 99 % | RESPIRATION RATE: 18 BRPM

## 2021-06-29 DIAGNOSIS — E66.01 MORBID OBESITY: ICD-10-CM

## 2021-06-29 DIAGNOSIS — D89.2 HYPERGAMMAGLOBULINEMIA: ICD-10-CM

## 2021-06-29 DIAGNOSIS — F51.01 PRIMARY INSOMNIA: ICD-10-CM

## 2021-06-29 DIAGNOSIS — I10 ESSENTIAL HYPERTENSION: ICD-10-CM

## 2021-06-29 DIAGNOSIS — Z23 NEED FOR 23-POLYVALENT PNEUMOCOCCAL POLYSACCHARIDE VACCINE: Primary | ICD-10-CM

## 2021-06-29 LAB
ALBUMIN, ELECTROPHORESIS: 3.8 G/DL (ref 3.75–5.01)
ALPHA1 GLOB FLD ELPH-MCNC: 0.34 G/DL (ref 0.19–0.46)
ALPHA2 GLOB FLD ELPH-MCNC: 0.75 G/DL (ref 0.48–1.05)
B-GLOBULIN FLD ELPH-MCNC: 0.88 G/DL (ref 0.48–1.1)
EER PROTEIN ELECTROPHORESIS, SERUM: ABNORMAL
GAMMA GLOB FLD ELPH-MCNC: 1.53 G/DL (ref 0.62–1.51)
IMMUNOFIXATION INTERPRETATION: ABNORMAL
TOTAL PROTEIN, ELECTROPHORESIS: 7.3 G/DL (ref 6.3–8.2)

## 2021-06-29 PROCEDURE — 99215 OFFICE O/P EST HI 40 MIN: CPT | Mod: S$GLB,,, | Performed by: INTERNAL MEDICINE

## 2021-06-29 PROCEDURE — 99215 PR OFFICE/OUTPT VISIT, EST, LEVL V, 40-54 MIN: ICD-10-PCS | Mod: S$GLB,,, | Performed by: INTERNAL MEDICINE

## 2021-06-29 RX ORDER — CARVEDILOL 25 MG/1
25 TABLET ORAL 2 TIMES DAILY WITH MEALS
Qty: 60 TABLET | Refills: 11 | Status: SHIPPED | OUTPATIENT
Start: 2021-06-29 | End: 2021-12-03 | Stop reason: SDUPTHER

## 2021-06-29 RX ORDER — PRAZOSIN HYDROCHLORIDE 1 MG/1
1 CAPSULE ORAL 2 TIMES DAILY
Qty: 60 CAPSULE | Refills: 11 | Status: CANCELLED | OUTPATIENT
Start: 2021-06-29 | End: 2022-06-29

## 2021-07-01 ENCOUNTER — PATIENT MESSAGE (OUTPATIENT)
Dept: ADMINISTRATIVE | Facility: OTHER | Age: 51
End: 2021-07-01

## 2021-07-07 ENCOUNTER — OFFICE VISIT (OUTPATIENT)
Dept: HEMATOLOGY/ONCOLOGY | Facility: CLINIC | Age: 51
End: 2021-07-07
Payer: MEDICARE

## 2021-07-07 VITALS
HEART RATE: 76 BPM | WEIGHT: 273.5 LBS | HEIGHT: 68 IN | RESPIRATION RATE: 18 BRPM | TEMPERATURE: 98 F | DIASTOLIC BLOOD PRESSURE: 89 MMHG | OXYGEN SATURATION: 97 % | BODY MASS INDEX: 41.45 KG/M2 | SYSTOLIC BLOOD PRESSURE: 146 MMHG

## 2021-07-07 DIAGNOSIS — D89.2 HYPERGAMMAGLOBULINEMIA: ICD-10-CM

## 2021-07-07 DIAGNOSIS — D47.2 MONOCLONAL GAMMOPATHY: Primary | ICD-10-CM

## 2021-07-07 LAB
ALBUMIN SERPL BCP-MCNC: 3.7 G/DL (ref 3.4–5)
ALBUMIN/GLOBULIN RATIO: 0.82 RATIO (ref 1.1–1.8)
ALP SERPL-CCNC: 183 U/L (ref 46–116)
ALT SERPL W P-5'-P-CCNC: 41 U/L (ref 12–78)
ANION GAP SERPL CALC-SCNC: 8 MMOL/L (ref 3–11)
AST SERPL-CCNC: 17 U/L (ref 15–37)
BASOPHILS NFR BLD: 0.9 % (ref 0–3)
BILIRUB SERPL-MCNC: 0.4 MG/DL (ref 0–1)
BUN SERPL-MCNC: 15 MG/DL (ref 7–18)
BUN/CREAT SERPL: 21.42 RATIO (ref 7–18)
CALCIUM SERPL-MCNC: 9 MG/DL (ref 8.8–10.5)
CHLORIDE SERPL-SCNC: 105 MMOL/L (ref 100–108)
CO2 SERPL-SCNC: 32 MMOL/L (ref 21–32)
CREAT SERPL-MCNC: 0.7 MG/DL (ref 0.55–1.02)
EOSINOPHIL NFR BLD: 3.4 % (ref 1–3)
ERYTHROCYTE [DISTWIDTH] IN BLOOD BY AUTOMATED COUNT: 14.2 % (ref 12.5–18)
GFR ESTIMATION: > 60
GLOBULIN: 4.5 G/DL (ref 2.3–3.5)
GLUCOSE SERPL-MCNC: 139 MG/DL (ref 70–110)
HCT VFR BLD AUTO: 42.1 % (ref 37–47)
HGB BLD-MCNC: 12.9 G/DL (ref 12–16)
HYPOCHROMIA BLD QL SMEAR: NORMAL
LYMPHOCYTES NFR BLD: 28 % (ref 25–40)
MCH RBC QN AUTO: 27.7 PG (ref 27–31.2)
MCHC RBC AUTO-ENTMCNC: 30.6 G/DL (ref 31.8–35.4)
MCV RBC AUTO: 90.5 FL (ref 80–97)
MONOCYTES NFR BLD: 7 % (ref 1–15)
NEUTROPHILS # BLD AUTO: 4.79 10*3/UL (ref 1.8–7.7)
NEUTROPHILS NFR BLD: 60.2 % (ref 37–80)
NUCLEATED RED BLOOD CELLS: 0 %
PLATELETS: 245 10*3/UL (ref 142–424)
POTASSIUM SERPL-SCNC: 3.8 MMOL/L (ref 3.6–5.2)
PROT SERPL-MCNC: 8.2 G/DL (ref 6.4–8.2)
RBC # BLD AUTO: 4.65 10*6/UL (ref 4.2–5.4)
SODIUM BLD-SCNC: 145 MMOL/L (ref 135–145)
WBC # BLD: 8 10*3/UL (ref 4.6–10.2)

## 2021-07-07 PROCEDURE — 99205 PR OFFICE/OUTPT VISIT, NEW, LEVL V, 60-74 MIN: ICD-10-PCS | Mod: S$GLB,,, | Performed by: INTERNAL MEDICINE

## 2021-07-07 PROCEDURE — 99205 OFFICE O/P NEW HI 60 MIN: CPT | Mod: S$GLB,,, | Performed by: INTERNAL MEDICINE

## 2021-07-08 LAB — SPECIMEN TO PATHOLOGY: NORMAL

## 2021-07-10 LAB
IGA SERPL-MCNC: 189 MG/DL (ref 68–408)
IGG SERPL-MCNC: 1636 MG/DL (ref 768–1632)
IGM: 145 MG/DL (ref 35–263)
INTERPRETATION UR IFE-IMP: ABNORMAL
KAPPA FREE LIGHT CHAINS: 32.71 MG/L (ref 3.3–19.4)
KAPPA/LAMBDA FLC RATIO: 1.3 (ref 0.26–1.65)
LAMBDA LIGHT CHAIN, FREE, SERUM: 25.07 MG/L (ref 5.71–26.3)
Lab: ABNORMAL HR
TOTAL PROTEIN: ABNORMAL
URINE FREE KAPPA EXCRETION/DAY: ABNORMAL MG/D
URINE FREE KAPPA LIGHT CHAINS: 71.71 MG/L (ref 0–32.9)
URINE FREE LAMBDA EXCRETION/DAY: ABNORMAL MG/D
URINE FREE LAMBDA LIGHT CHAINS: 4.08 MG/L (ref 0–3.79)
VOLUME: ABNORMAL ML

## 2021-07-13 ENCOUNTER — TELEPHONE (OUTPATIENT)
Dept: HEMATOLOGY/ONCOLOGY | Facility: CLINIC | Age: 51
End: 2021-07-13

## 2021-07-15 ENCOUNTER — TELEPHONE (OUTPATIENT)
Dept: PRIMARY CARE CLINIC | Facility: CLINIC | Age: 51
End: 2021-07-15

## 2021-07-23 DIAGNOSIS — F51.01 PRIMARY INSOMNIA: ICD-10-CM

## 2021-07-23 RX ORDER — ZOLPIDEM TARTRATE 5 MG/1
5 TABLET ORAL NIGHTLY PRN
Qty: 30 TABLET | Refills: 0 | Status: SHIPPED | OUTPATIENT
Start: 2021-07-23 | End: 2021-09-02 | Stop reason: SDUPTHER

## 2021-07-26 ENCOUNTER — OFFICE VISIT (OUTPATIENT)
Dept: HEMATOLOGY/ONCOLOGY | Facility: CLINIC | Age: 51
End: 2021-07-26
Payer: MEDICARE

## 2021-07-26 VITALS
HEIGHT: 68 IN | RESPIRATION RATE: 16 BRPM | TEMPERATURE: 98 F | WEIGHT: 276 LBS | SYSTOLIC BLOOD PRESSURE: 157 MMHG | BODY MASS INDEX: 41.83 KG/M2 | OXYGEN SATURATION: 96 % | HEART RATE: 87 BPM | DIASTOLIC BLOOD PRESSURE: 95 MMHG

## 2021-07-26 DIAGNOSIS — C90.00 MULTIPLE MYELOMA, REMISSION STATUS UNSPECIFIED: ICD-10-CM

## 2021-07-26 DIAGNOSIS — D47.2 MGUS (MONOCLONAL GAMMOPATHY OF UNKNOWN SIGNIFICANCE): ICD-10-CM

## 2021-07-26 DIAGNOSIS — D47.2 MONOCLONAL GAMMOPATHY: Primary | ICD-10-CM

## 2021-07-26 LAB
BASOPHILS NFR BLD: 0.8 % (ref 0–3)
EOSINOPHIL NFR BLD: 2.5 % (ref 1–3)
ERYTHROCYTE [DISTWIDTH] IN BLOOD BY AUTOMATED COUNT: 14.2 % (ref 12.5–18)
HCT VFR BLD AUTO: 40.8 % (ref 37–47)
HGB BLD-MCNC: 12.4 G/DL (ref 12–16)
HYPOCHROMIA BLD QL SMEAR: NORMAL
LYMPHOCYTES NFR BLD: 27.2 % (ref 25–40)
MCH RBC QN AUTO: 27.6 PG (ref 27–31.2)
MCHC RBC AUTO-ENTMCNC: 30.4 G/DL (ref 31.8–35.4)
MCV RBC AUTO: 90.7 FL (ref 80–97)
MONOCYTES NFR BLD: 6.6 % (ref 1–15)
NEUTROPHILS # BLD AUTO: 4.79 10*3/UL (ref 1.8–7.7)
NEUTROPHILS NFR BLD: 62.4 % (ref 37–80)
NUCLEATED RED BLOOD CELLS: 0 %
PLATELETS: 230 10*3/UL (ref 142–424)
RBC # BLD AUTO: 4.5 10*6/UL (ref 4.2–5.4)
WBC # BLD: 7.7 10*3/UL (ref 4.6–10.2)

## 2021-07-26 PROCEDURE — 99214 PR OFFICE/OUTPT VISIT, EST, LEVL IV, 30-39 MIN: ICD-10-PCS | Mod: S$GLB,,, | Performed by: INTERNAL MEDICINE

## 2021-07-26 PROCEDURE — 99214 OFFICE O/P EST MOD 30 MIN: CPT | Mod: S$GLB,,, | Performed by: INTERNAL MEDICINE

## 2021-08-05 LAB
APTT PPP: 30.8 SEC (ref 25.1–36.5)
CELLS COUNTED: 100
EOSINOPHIL NFR BLD: 2 % (ref 0–6)
ERYTHROCYTE [DISTWIDTH] IN BLOOD BY AUTOMATED COUNT: 14.2 % (ref 0–15.5)
GRANULOCYTES: 3.6 10*3/UL (ref 1.4–7)
HCT VFR BLD AUTO: 36.8 % (ref 37–47)
HGB BLD-MCNC: 11.3 G/DL (ref 12–16)
INR PPP: 1 INR (ref 0.9–1.1)
LYMPHOCYTES NFR BLD: 49 % (ref 20–45)
MCH RBC QN AUTO: 27.4 PG (ref 27–32)
MCHC RBC AUTO-ENTMCNC: 30.7 % (ref 32–36)
MCV RBC AUTO: 89.1 FL (ref 80–99)
MONOCYTES NFR BLD: 8 % (ref 2–10)
NEUTROPHILS NFR BLD: 41 % (ref 50–80)
NUCLEATED RBC-MANUAL: 0 /100 WBC
PLATELET MORPHOLOGY: NORMAL
PLATELETS: 241 10*3/UL (ref 130–400)
PMV BLD AUTO: 10.1 FL (ref 9.2–12.2)
PROTHROMBIN TIME: 11.5 SEC (ref 10.2–12.9)
RBC # BLD AUTO: 4.13 10*6/UL (ref 4.2–5.4)
RBC MORPH BLD: ABNORMAL
SMALL PLATELETS BLD QL SMEAR: NORMAL
WBC # BLD: 8.8 10*3/UL (ref 4.5–10)

## 2021-08-19 ENCOUNTER — OFFICE VISIT (OUTPATIENT)
Dept: PRIMARY CARE CLINIC | Facility: CLINIC | Age: 51
End: 2021-08-19
Payer: MEDICARE

## 2021-08-19 ENCOUNTER — TELEPHONE (OUTPATIENT)
Dept: HEMATOLOGY/ONCOLOGY | Facility: CLINIC | Age: 51
End: 2021-08-19

## 2021-08-19 ENCOUNTER — TELEPHONE (OUTPATIENT)
Dept: PRIMARY CARE CLINIC | Facility: CLINIC | Age: 51
End: 2021-08-19

## 2021-08-19 VITALS
RESPIRATION RATE: 20 BRPM | SYSTOLIC BLOOD PRESSURE: 159 MMHG | WEIGHT: 274.38 LBS | HEIGHT: 68 IN | OXYGEN SATURATION: 98 % | HEART RATE: 85 BPM | DIASTOLIC BLOOD PRESSURE: 92 MMHG | BODY MASS INDEX: 41.59 KG/M2 | TEMPERATURE: 98 F

## 2021-08-19 DIAGNOSIS — F41.9 ANXIETY: Primary | ICD-10-CM

## 2021-08-19 DIAGNOSIS — R51.9 ACUTE NONINTRACTABLE HEADACHE, UNSPECIFIED HEADACHE TYPE: ICD-10-CM

## 2021-08-19 DIAGNOSIS — I10 ESSENTIAL HYPERTENSION: Primary | ICD-10-CM

## 2021-08-19 LAB — SPECIMEN TO PATHOLOGY: NORMAL

## 2021-08-19 PROCEDURE — 99214 PR OFFICE/OUTPT VISIT, EST, LEVL IV, 30-39 MIN: ICD-10-PCS | Mod: S$GLB,,, | Performed by: INTERNAL MEDICINE

## 2021-08-19 PROCEDURE — 99214 OFFICE O/P EST MOD 30 MIN: CPT | Mod: S$GLB,,, | Performed by: INTERNAL MEDICINE

## 2021-08-19 RX ORDER — LORAZEPAM 0.5 MG/1
0.5 TABLET ORAL
Qty: 2 TABLET | Refills: 0 | Status: SHIPPED | OUTPATIENT
Start: 2021-08-19 | End: 2021-12-03

## 2021-08-19 RX ORDER — SPIRONOLACTONE 50 MG/1
50 TABLET, FILM COATED ORAL DAILY
Qty: 30 TABLET | Refills: 11 | Status: SHIPPED | OUTPATIENT
Start: 2021-08-19 | End: 2021-12-03 | Stop reason: SDUPTHER

## 2021-08-20 ENCOUNTER — TELEPHONE (OUTPATIENT)
Dept: HEMATOLOGY/ONCOLOGY | Facility: CLINIC | Age: 51
End: 2021-08-20

## 2021-08-25 ENCOUNTER — OFFICE VISIT (OUTPATIENT)
Dept: HEMATOLOGY/ONCOLOGY | Facility: CLINIC | Age: 51
End: 2021-08-25
Payer: MEDICARE

## 2021-08-25 VITALS
HEART RATE: 71 BPM | WEIGHT: 273.88 LBS | TEMPERATURE: 98 F | RESPIRATION RATE: 18 BRPM | HEIGHT: 68 IN | OXYGEN SATURATION: 97 % | BODY MASS INDEX: 41.51 KG/M2 | DIASTOLIC BLOOD PRESSURE: 88 MMHG | SYSTOLIC BLOOD PRESSURE: 136 MMHG

## 2021-08-25 DIAGNOSIS — D47.2 SMOLDERING MYELOMA: Primary | ICD-10-CM

## 2021-08-25 LAB
ALBUMIN SERPL BCP-MCNC: 3.6 G/DL (ref 3.4–5)
ALBUMIN/GLOBULIN RATIO: 0.8 RATIO (ref 1.1–1.8)
ALP SERPL-CCNC: 159 U/L (ref 46–116)
ALT SERPL W P-5'-P-CCNC: 53 U/L (ref 12–78)
ANION GAP SERPL CALC-SCNC: 9 MMOL/L (ref 3–11)
AST SERPL-CCNC: 20 U/L (ref 15–37)
BASOPHILS NFR BLD: 0.8 % (ref 0–3)
BILIRUB SERPL-MCNC: 0.3 MG/DL (ref 0–1)
BUN SERPL-MCNC: 22 MG/DL (ref 7–18)
BUN/CREAT SERPL: 26.82 RATIO (ref 7–18)
CALCIUM SERPL-MCNC: 8.7 MG/DL (ref 8.8–10.5)
CHLORIDE SERPL-SCNC: 106 MMOL/L (ref 100–108)
CO2 SERPL-SCNC: 28 MMOL/L (ref 21–32)
CREAT SERPL-MCNC: 0.82 MG/DL (ref 0.55–1.02)
EOSINOPHIL NFR BLD: 2.7 % (ref 1–3)
ERYTHROCYTE [DISTWIDTH] IN BLOOD BY AUTOMATED COUNT: 14.6 % (ref 12.5–18)
GFR ESTIMATION: > 60
GLOBULIN: 4.5 G/DL (ref 2.3–3.5)
GLUCOSE SERPL-MCNC: 115 MG/DL (ref 70–110)
HCT VFR BLD AUTO: 41.7 % (ref 37–47)
HGB BLD-MCNC: 12.5 G/DL (ref 12–16)
HYPOCHROMIA BLD QL SMEAR: NORMAL
LYMPHOCYTES NFR BLD: 30.2 % (ref 25–40)
MCH RBC QN AUTO: 27.6 PG (ref 27–31.2)
MCHC RBC AUTO-ENTMCNC: 30 G/DL (ref 31.8–35.4)
MCV RBC AUTO: 92.1 FL (ref 80–97)
MONOCYTES NFR BLD: 8.3 % (ref 1–15)
NEUTROPHILS # BLD AUTO: 4.22 10*3/UL (ref 1.8–7.7)
NEUTROPHILS NFR BLD: 57.1 % (ref 37–80)
NUCLEATED RED BLOOD CELLS: 0 %
PLATELETS: 227 10*3/UL (ref 142–424)
POTASSIUM SERPL-SCNC: 4.3 MMOL/L (ref 3.6–5.2)
PROT SERPL-MCNC: 8.1 G/DL (ref 6.4–8.2)
RBC # BLD AUTO: 4.53 10*6/UL (ref 4.2–5.4)
SODIUM BLD-SCNC: 143 MMOL/L (ref 135–145)
WBC # BLD: 7.4 10*3/UL (ref 4.6–10.2)

## 2021-08-25 PROCEDURE — 99214 PR OFFICE/OUTPT VISIT, EST, LEVL IV, 30-39 MIN: ICD-10-PCS | Mod: S$GLB,,, | Performed by: INTERNAL MEDICINE

## 2021-08-25 PROCEDURE — 99214 OFFICE O/P EST MOD 30 MIN: CPT | Mod: S$GLB,,, | Performed by: INTERNAL MEDICINE

## 2021-08-30 ENCOUNTER — TELEPHONE (OUTPATIENT)
Dept: PRIMARY CARE CLINIC | Facility: CLINIC | Age: 51
End: 2021-08-30

## 2021-09-02 DIAGNOSIS — F51.01 PRIMARY INSOMNIA: ICD-10-CM

## 2021-09-02 RX ORDER — ZOLPIDEM TARTRATE 5 MG/1
5 TABLET ORAL NIGHTLY PRN
Qty: 30 TABLET | Refills: 0 | Status: SHIPPED | OUTPATIENT
Start: 2021-09-02 | End: 2021-10-14 | Stop reason: SDUPTHER

## 2021-09-20 ENCOUNTER — TELEPHONE (OUTPATIENT)
Dept: PRIMARY CARE CLINIC | Facility: CLINIC | Age: 51
End: 2021-09-20
Payer: MEDICARE

## 2021-09-23 ENCOUNTER — TELEPHONE (OUTPATIENT)
Dept: PRIMARY CARE CLINIC | Facility: CLINIC | Age: 51
End: 2021-09-23

## 2021-10-14 DIAGNOSIS — F51.01 PRIMARY INSOMNIA: ICD-10-CM

## 2021-10-14 RX ORDER — ZOLPIDEM TARTRATE 5 MG/1
5 TABLET ORAL NIGHTLY PRN
Qty: 30 TABLET | Refills: 0 | Status: SHIPPED | OUTPATIENT
Start: 2021-10-14 | End: 2021-12-03 | Stop reason: SDUPTHER

## 2021-12-02 ENCOUNTER — TELEPHONE (OUTPATIENT)
Dept: PRIMARY CARE CLINIC | Facility: CLINIC | Age: 51
End: 2021-12-02
Payer: MEDICARE

## 2021-12-02 DIAGNOSIS — F51.01 PRIMARY INSOMNIA: ICD-10-CM

## 2021-12-03 ENCOUNTER — OFFICE VISIT (OUTPATIENT)
Dept: PRIMARY CARE CLINIC | Facility: CLINIC | Age: 51
End: 2021-12-03
Payer: MEDICARE

## 2021-12-03 VITALS
SYSTOLIC BLOOD PRESSURE: 141 MMHG | DIASTOLIC BLOOD PRESSURE: 89 MMHG | RESPIRATION RATE: 16 BRPM | HEART RATE: 83 BPM | HEIGHT: 68 IN | TEMPERATURE: 98 F | OXYGEN SATURATION: 96 % | WEIGHT: 280.63 LBS | BODY MASS INDEX: 42.53 KG/M2

## 2021-12-03 DIAGNOSIS — D50.8 HYPOCHROMIC ERYTHROCYTES: ICD-10-CM

## 2021-12-03 DIAGNOSIS — D53.1 OTHER MEGALOBLASTIC ANEMIAS, NOT ELSEWHERE CLASSIFIED: ICD-10-CM

## 2021-12-03 DIAGNOSIS — I10 ESSENTIAL HYPERTENSION: ICD-10-CM

## 2021-12-03 DIAGNOSIS — D75.89 MACROCYTOSIS: ICD-10-CM

## 2021-12-03 DIAGNOSIS — F51.01 PRIMARY INSOMNIA: ICD-10-CM

## 2021-12-03 DIAGNOSIS — N20.0 NEPHROLITHIASIS: Primary | ICD-10-CM

## 2021-12-03 DIAGNOSIS — B35.4 TINEA CORPORIS: ICD-10-CM

## 2021-12-03 LAB
ALBUMIN SERPL BCP-MCNC: 3.5 G/DL (ref 3.4–5)
ALBUMIN/GLOBULIN RATIO: 0.88 RATIO (ref 1.1–1.8)
ALP SERPL-CCNC: 145 U/L (ref 46–116)
ALT SERPL W P-5'-P-CCNC: 62 U/L (ref 12–78)
ANION GAP SERPL CALC-SCNC: 9 MMOL/L (ref 3–11)
APPEARANCE, UA: CLEAR
AST SERPL-CCNC: 43 U/L (ref 15–37)
BACTERIA SPEC CULT: ABNORMAL /HPF
BASOPHILS NFR BLD: 0.3 % (ref 0–3)
BILIRUB SERPL-MCNC: 0.3 MG/DL (ref 0–1)
BILIRUB UR QL STRIP: NEGATIVE MG/DL
BUN SERPL-MCNC: 14 MG/DL (ref 7–18)
BUN/CREAT SERPL: 17.5 RATIO (ref 7–18)
CALCIUM SERPL-MCNC: 8.8 MG/DL (ref 8.8–10.5)
CHLORIDE SERPL-SCNC: 107 MMOL/L (ref 100–108)
CO2 SERPL-SCNC: 28 MMOL/L (ref 21–32)
COLOR UR: ABNORMAL
CREAT SERPL-MCNC: 0.8 MG/DL (ref 0.55–1.02)
EOSINOPHIL NFR BLD: 0 % (ref 1–3)
ERYTHROCYTE [DISTWIDTH] IN BLOOD BY AUTOMATED COUNT: 13.8 % (ref 12.5–18)
GFR ESTIMATION: > 60
GLOBULIN: 4 G/DL (ref 2.3–3.5)
GLUCOSE (UA): NORMAL MG/DL
GLUCOSE SERPL-MCNC: 159 MG/DL (ref 70–110)
HCT VFR BLD AUTO: 41.1 % (ref 37–47)
HGB BLD-MCNC: 12.7 G/DL (ref 12–16)
HGB UR QL STRIP: 50 /UL
HYPOCHROMIA BLD QL SMEAR: NORMAL
KETONES UR QL STRIP: NEGATIVE MG/DL
LEUKOCYTE ESTERASE UR QL STRIP: NEGATIVE /UL
LYMPHOCYTES NFR BLD: 14.2 % (ref 25–40)
MACROCYTES BLD QL SMEAR: NORMAL
MCH RBC QN AUTO: 27.9 PG (ref 27–31.2)
MCHC RBC AUTO-ENTMCNC: 30.9 G/DL (ref 31.8–35.4)
MCV RBC AUTO: 90.3 FL (ref 80–97)
MONOCYTES NFR BLD: 2.6 % (ref 1–15)
NEUTROPHILS # BLD AUTO: 4.81 10*3/UL (ref 1.8–7.7)
NEUTROPHILS NFR BLD: 82.2 % (ref 37–80)
NITRITE UR QL STRIP: NEGATIVE
NUCLEATED RED BLOOD CELLS: 0 %
PH UR STRIP: 6 PH (ref 5–9)
PLATELETS: 242 10*3/UL (ref 142–424)
POTASSIUM SERPL-SCNC: 3.8 MMOL/L (ref 3.6–5.2)
PROT SERPL-MCNC: 7.5 G/DL (ref 6.4–8.2)
PROT UR QL STRIP: ABNORMAL MG/DL
RBC # BLD AUTO: 4.55 10*6/UL (ref 4.2–5.4)
RBC #/AREA URNS HPF: ABNORMAL /HPF (ref 0–2)
SERVICE COMMENT 03: ABNORMAL
SODIUM BLD-SCNC: 144 MMOL/L (ref 135–145)
SP GR UR STRIP: 1.02 (ref 1–1.03)
SPECIMEN COLLECTION METHOD, URINE: ABNORMAL
SQUAMOUS EPITHELIAL, UA: ABNORMAL /LPF
UROBILINOGEN UR STRIP-ACNC: 4 MG/DL
WBC # BLD: 5.9 10*3/UL (ref 4.6–10.2)
WBC #/AREA URNS HPF: ABNORMAL /HPF (ref 0–5)

## 2021-12-03 PROCEDURE — 99214 PR OFFICE/OUTPT VISIT, EST, LEVL IV, 30-39 MIN: ICD-10-PCS | Mod: S$GLB,,, | Performed by: INTERNAL MEDICINE

## 2021-12-03 PROCEDURE — 99214 OFFICE O/P EST MOD 30 MIN: CPT | Mod: S$GLB,,, | Performed by: INTERNAL MEDICINE

## 2021-12-03 RX ORDER — KETOCONAZOLE 20 MG/G
CREAM TOPICAL DAILY
Qty: 30 G | Refills: 1 | Status: SHIPPED | OUTPATIENT
Start: 2021-12-03 | End: 2022-01-19 | Stop reason: SDUPTHER

## 2021-12-03 RX ORDER — AMLODIPINE BESYLATE 10 MG/1
10 TABLET ORAL DAILY
Qty: 90 TABLET | Refills: 1 | Status: SHIPPED | OUTPATIENT
Start: 2021-12-03 | End: 2022-01-19 | Stop reason: SDUPTHER

## 2021-12-03 RX ORDER — TAMSULOSIN HYDROCHLORIDE 0.4 MG/1
0.4 CAPSULE ORAL DAILY
Qty: 30 CAPSULE | Refills: 0 | Status: SHIPPED | OUTPATIENT
Start: 2021-12-03 | End: 2022-01-19

## 2021-12-03 RX ORDER — CARVEDILOL 25 MG/1
25 TABLET ORAL 2 TIMES DAILY WITH MEALS
Qty: 60 TABLET | Refills: 11 | Status: SHIPPED | OUTPATIENT
Start: 2021-12-03 | End: 2022-01-19 | Stop reason: SDUPTHER

## 2021-12-03 RX ORDER — ZOLPIDEM TARTRATE 5 MG/1
5 TABLET ORAL NIGHTLY PRN
Qty: 30 TABLET | Refills: 0 | Status: SHIPPED | OUTPATIENT
Start: 2021-12-03 | End: 2022-01-04 | Stop reason: SDUPTHER

## 2021-12-03 RX ORDER — SPIRONOLACTONE 50 MG/1
50 TABLET, FILM COATED ORAL DAILY
Qty: 30 TABLET | Refills: 11 | Status: SHIPPED | OUTPATIENT
Start: 2021-12-03 | End: 2022-01-19

## 2021-12-03 RX ORDER — HYDROCHLOROTHIAZIDE 25 MG/1
25 TABLET ORAL DAILY
Qty: 90 TABLET | Refills: 1 | Status: SHIPPED | OUTPATIENT
Start: 2021-12-03 | End: 2022-01-19 | Stop reason: SDUPTHER

## 2021-12-03 RX ORDER — ZOLPIDEM TARTRATE 5 MG/1
5 TABLET ORAL NIGHTLY PRN
Qty: 30 TABLET | Refills: 0 | OUTPATIENT
Start: 2021-12-03

## 2021-12-04 LAB
ALBUMIN SERPL-MCNC: 4.2 G/DL (ref 3.6–5.1)
ALBUMIN/GLOB SERPL: 1.2 (CALC) (ref 1–2.5)
ALP SERPL-CCNC: 129 U/L (ref 37–153)
ALT SERPL-CCNC: 37 U/L (ref 6–29)
APPEARANCE UR: CLEAR
AST SERPL-CCNC: 38 U/L (ref 10–35)
BACTERIA #/AREA URNS HPF: ABNORMAL /HPF
BILIRUB SERPL-MCNC: 0.4 MG/DL (ref 0.2–1.2)
BILIRUB UR QL STRIP: NEGATIVE
BUN SERPL-MCNC: 14 MG/DL (ref 7–25)
BUN/CREAT SERPL: ABNORMAL (CALC) (ref 6–22)
CALCIUM SERPL-MCNC: 9.3 MG/DL (ref 8.6–10.4)
CHLORIDE SERPL-SCNC: 105 MMOL/L (ref 98–110)
CO2 SERPL-SCNC: 22 MMOL/L (ref 20–32)
COLOR UR: YELLOW
CREAT SERPL-MCNC: 0.66 MG/DL (ref 0.5–1.05)
GLOBULIN SER CALC-MCNC: 3.4 G/DL (CALC) (ref 1.9–3.7)
GLUCOSE SERPL-MCNC: 154 MG/DL (ref 65–99)
GLUCOSE UR QL STRIP: NEGATIVE
HGB UR QL STRIP: NEGATIVE
HYALINE CASTS #/AREA URNS LPF: ABNORMAL /LPF
KETONES UR QL STRIP: NEGATIVE
LEUKOCYTE ESTERASE UR QL STRIP: NEGATIVE
NITRITE UR QL STRIP: NEGATIVE
PH UR STRIP: 5.5 [PH] (ref 5–8)
POTASSIUM SERPL-SCNC: 4.1 MMOL/L (ref 3.5–5.3)
PROT SERPL-MCNC: 7.6 G/DL (ref 6.1–8.1)
PROT UR QL STRIP: ABNORMAL
RBC #/AREA URNS HPF: ABNORMAL /HPF
SODIUM SERPL-SCNC: 141 MMOL/L (ref 135–146)
SP GR UR STRIP: 1.03 (ref 1–1.03)
SQUAMOUS #/AREA URNS HPF: ABNORMAL /HPF
WBC #/AREA URNS HPF: ABNORMAL /HPF
YEAST #/AREA URNS HPF: ABNORMAL /HPF

## 2021-12-08 ENCOUNTER — TELEPHONE (OUTPATIENT)
Dept: PRIMARY CARE CLINIC | Facility: CLINIC | Age: 51
End: 2021-12-08
Payer: MEDICARE

## 2021-12-09 ENCOUNTER — OFFICE VISIT (OUTPATIENT)
Dept: HEMATOLOGY/ONCOLOGY | Facility: CLINIC | Age: 51
End: 2021-12-09
Payer: MEDICARE

## 2021-12-09 ENCOUNTER — TELEPHONE (OUTPATIENT)
Dept: HEMATOLOGY/ONCOLOGY | Facility: CLINIC | Age: 51
End: 2021-12-09

## 2021-12-09 VITALS
BODY MASS INDEX: 42.15 KG/M2 | TEMPERATURE: 97 F | SYSTOLIC BLOOD PRESSURE: 137 MMHG | WEIGHT: 278.13 LBS | HEART RATE: 76 BPM | RESPIRATION RATE: 18 BRPM | OXYGEN SATURATION: 96 % | DIASTOLIC BLOOD PRESSURE: 84 MMHG | HEIGHT: 68 IN

## 2021-12-09 DIAGNOSIS — Z12.89 ENCOUNTER FOR SCREENING FOR MALIGNANT NEOPLASM OF OTHER SITES: ICD-10-CM

## 2021-12-09 DIAGNOSIS — D47.2 SMOLDERING MYELOMA: Primary | ICD-10-CM

## 2021-12-09 LAB
ALBUMIN SERPL BCP-MCNC: 3.5 G/DL (ref 3.4–5)
ALBUMIN/GLOBULIN RATIO: 0.83 RATIO (ref 1.1–1.8)
ALP SERPL-CCNC: 134 U/L (ref 46–116)
ALT SERPL W P-5'-P-CCNC: 42 U/L (ref 12–78)
ANION GAP SERPL CALC-SCNC: 7 MMOL/L (ref 3–11)
AST SERPL-CCNC: 20 U/L (ref 15–37)
BASOPHILS NFR BLD: 0.7 % (ref 0–3)
BILIRUB SERPL-MCNC: 0.2 MG/DL (ref 0–1)
BUN SERPL-MCNC: 18 MG/DL (ref 7–18)
BUN/CREAT SERPL: 22.78 RATIO (ref 7–18)
CALCIUM SERPL-MCNC: 8.6 MG/DL (ref 8.8–10.5)
CHLORIDE SERPL-SCNC: 106 MMOL/L (ref 100–108)
CO2 SERPL-SCNC: 30 MMOL/L (ref 21–32)
CREAT SERPL-MCNC: 0.79 MG/DL (ref 0.55–1.02)
EOSINOPHIL NFR BLD: 2.2 % (ref 1–3)
ERYTHROCYTE [DISTWIDTH] IN BLOOD BY AUTOMATED COUNT: 13.9 % (ref 12.5–18)
GFR ESTIMATION: > 60
GLOBULIN: 4.2 G/DL (ref 2.3–3.5)
GLUCOSE SERPL-MCNC: 134 MG/DL (ref 70–110)
HCT VFR BLD AUTO: 41.2 % (ref 37–47)
HGB BLD-MCNC: 12.7 G/DL (ref 12–16)
HYPOCHROMIA BLD QL SMEAR: NORMAL
LYMPHOCYTES NFR BLD: 33 % (ref 25–40)
MCH RBC QN AUTO: 28 PG (ref 27–31.2)
MCHC RBC AUTO-ENTMCNC: 30.8 G/DL (ref 31.8–35.4)
MCV RBC AUTO: 90.7 FL (ref 80–97)
MONOCYTES NFR BLD: 7.8 % (ref 1–15)
NEUTROPHILS # BLD AUTO: 4.08 10*3/UL (ref 1.8–7.7)
NEUTROPHILS NFR BLD: 55.8 % (ref 37–80)
NUCLEATED RED BLOOD CELLS: 0 %
PLATELETS: 232 10*3/UL (ref 142–424)
POTASSIUM SERPL-SCNC: 3.8 MMOL/L (ref 3.6–5.2)
PROT SERPL-MCNC: 7.7 G/DL (ref 6.4–8.2)
RBC # BLD AUTO: 4.54 10*6/UL (ref 4.2–5.4)
SODIUM BLD-SCNC: 143 MMOL/L (ref 135–145)
WBC # BLD: 7.3 10*3/UL (ref 4.6–10.2)

## 2021-12-09 PROCEDURE — 99214 OFFICE O/P EST MOD 30 MIN: CPT | Mod: S$GLB,,, | Performed by: INTERNAL MEDICINE

## 2021-12-09 PROCEDURE — 99214 PR OFFICE/OUTPT VISIT, EST, LEVL IV, 30-39 MIN: ICD-10-PCS | Mod: S$GLB,,, | Performed by: INTERNAL MEDICINE

## 2021-12-10 ENCOUNTER — TELEPHONE (OUTPATIENT)
Dept: HEMATOLOGY/ONCOLOGY | Facility: CLINIC | Age: 51
End: 2021-12-10
Payer: MEDICARE

## 2021-12-12 LAB
INTERPRETATION UR IFE-IMP: NORMAL
KAPPA FREE LIGHT CHAINS: 24.33 MG/L (ref 3.3–19.4)
KAPPA/LAMBDA FLC RATIO: 1.18 (ref 0.26–1.65)
LAMBDA LIGHT CHAIN, FREE, SERUM: 20.63 MG/L (ref 5.71–26.3)
Lab: NORMAL HR
TOTAL PROTEIN: NORMAL MG/D
URINE FREE KAPPA EXCRETION/DAY: NORMAL MG/D
URINE FREE KAPPA LIGHT CHAINS: 17.67 MG/L (ref 0–32.9)
URINE FREE LAMBDA EXCRETION/DAY: NORMAL MG/D
URINE FREE LAMBDA LIGHT CHAINS: 1.35 MG/L (ref 0–3.79)
VOLUME: NORMAL ML

## 2022-01-03 ENCOUNTER — TELEPHONE (OUTPATIENT)
Dept: HEMATOLOGY/ONCOLOGY | Facility: CLINIC | Age: 52
End: 2022-01-03
Payer: MEDICARE

## 2022-01-03 NOTE — TELEPHONE ENCOUNTER
Pt called to get test results. Voiced to pt she has an appt on 01/10/2022 @ 2:00pm and test results will be discussed that day. Pt voiced understanding.

## 2022-01-03 NOTE — TELEPHONE ENCOUNTER
----- Message from Cindy Sanford sent at 1/3/2022  9:32 AM CST -----  Regarding: test resutls  Contact: Pt  Type:  Test Results    Who Called:  Nancy Sun   Name of Test (Lab/Mammo/Etc):  bone scan   Date of Test: last week   Ordering Provider:  Radames   Where the test was performed:  [place]  Would the patient rather a call back or a response via [facility]? Call back   Best Call Back Number:  [phone]  Additional Information:

## 2022-01-04 DIAGNOSIS — F51.01 PRIMARY INSOMNIA: ICD-10-CM

## 2022-01-05 RX ORDER — ZOLPIDEM TARTRATE 5 MG/1
5 TABLET ORAL NIGHTLY PRN
Qty: 30 TABLET | Refills: 0 | Status: SHIPPED | OUTPATIENT
Start: 2022-01-05 | End: 2022-01-19 | Stop reason: SDUPTHER

## 2022-01-19 ENCOUNTER — OFFICE VISIT (OUTPATIENT)
Dept: PRIMARY CARE CLINIC | Facility: CLINIC | Age: 52
End: 2022-01-19
Payer: MEDICARE

## 2022-01-19 VITALS
OXYGEN SATURATION: 96 % | WEIGHT: 281.63 LBS | HEART RATE: 76 BPM | DIASTOLIC BLOOD PRESSURE: 81 MMHG | TEMPERATURE: 98 F | BODY MASS INDEX: 42.68 KG/M2 | HEIGHT: 68 IN | SYSTOLIC BLOOD PRESSURE: 136 MMHG | RESPIRATION RATE: 16 BRPM

## 2022-01-19 DIAGNOSIS — K57.90 DIVERTICULOSIS: Primary | ICD-10-CM

## 2022-01-19 DIAGNOSIS — D47.2 SMOLDERING MYELOMA: ICD-10-CM

## 2022-01-19 DIAGNOSIS — I10 ESSENTIAL HYPERTENSION: ICD-10-CM

## 2022-01-19 DIAGNOSIS — B35.4 TINEA CORPORIS: ICD-10-CM

## 2022-01-19 DIAGNOSIS — E66.01 MORBID OBESITY: ICD-10-CM

## 2022-01-19 DIAGNOSIS — F51.01 PRIMARY INSOMNIA: ICD-10-CM

## 2022-01-19 PROCEDURE — 99214 PR OFFICE/OUTPT VISIT, EST, LEVL IV, 30-39 MIN: ICD-10-PCS | Mod: S$GLB,,, | Performed by: INTERNAL MEDICINE

## 2022-01-19 PROCEDURE — 99214 OFFICE O/P EST MOD 30 MIN: CPT | Mod: S$GLB,,, | Performed by: INTERNAL MEDICINE

## 2022-01-19 RX ORDER — KETOCONAZOLE 20 MG/G
CREAM TOPICAL DAILY
Qty: 30 G | Refills: 1 | Status: SHIPPED | OUTPATIENT
Start: 2022-01-19 | End: 2022-03-21

## 2022-01-19 RX ORDER — AMLODIPINE BESYLATE 10 MG/1
10 TABLET ORAL DAILY
Qty: 30 TABLET | Refills: 3 | Status: SHIPPED | OUTPATIENT
Start: 2022-01-19 | End: 2022-06-15

## 2022-01-19 RX ORDER — SPIRONOLACTONE 50 MG/1
50 TABLET, FILM COATED ORAL DAILY
Qty: 30 TABLET | Refills: 11 | Status: SHIPPED | OUTPATIENT
Start: 2022-01-19 | End: 2022-02-01

## 2022-01-19 RX ORDER — ZOLPIDEM TARTRATE 5 MG/1
5 TABLET ORAL NIGHTLY PRN
Qty: 30 TABLET | Refills: 0 | Status: SHIPPED | OUTPATIENT
Start: 2022-01-19 | End: 2022-03-10 | Stop reason: SDUPTHER

## 2022-01-19 RX ORDER — CARVEDILOL 25 MG/1
25 TABLET ORAL 2 TIMES DAILY WITH MEALS
Qty: 60 TABLET | Refills: 3 | Status: SHIPPED | OUTPATIENT
Start: 2022-01-19 | End: 2022-07-12 | Stop reason: SDUPTHER

## 2022-01-19 RX ORDER — HYDROCHLOROTHIAZIDE 25 MG/1
25 TABLET ORAL DAILY
Qty: 30 TABLET | Refills: 3 | Status: SHIPPED | OUTPATIENT
Start: 2022-01-19 | End: 2022-06-15

## 2022-01-19 RX ORDER — SPIRONOLACTONE 50 MG/1
50 TABLET, FILM COATED ORAL DAILY
Qty: 30 TABLET | Refills: 3 | Status: CANCELLED | OUTPATIENT
Start: 2022-01-19 | End: 2023-01-19

## 2022-01-19 NOTE — PROGRESS NOTES
"  Subjective:      Patient ID: Nancy Sun is a 51 y.o. female.    Chief Complaint: Generalized Body Aches, Flank Pain (Pt stated "dr gomez said something is wrong w/my left rib"), and Cough (Pt stated "when coughing a lot I become really dizzy like im gonna black out" this has been going on for about a month or two)     Patient with h/o HTN, home blood pressures are not being monitored.  Patient reports compliance with medication.   Patient serum metanephrines were slightly high and urine metanephrines were normal.  Patient denies any chest pain, shortness of breath, ankle swelling. Bp still a little on higher side.     Patient has history of MCGUS and is being followed by Dr. Gomez.  Patient was evaluated on last visit with complains of Left Flansk and lower Quadrant pain. Patient was evalauted in ER where she was found to have 2mm Renal stone. Patient had Bone scan done by oncologist and was found to have increase uptake in Left 7th rib posteriorly. Patient reports of pain in the left lower Quadrant and that is intermittent, severe in intensity, sharp, no exacerbating or relieving factors known.       Review of Systems   Constitutional: Positive for weight loss (2 lb weight loss.). Negative for chills, diaphoresis, fever and malaise/fatigue.   HENT: Negative for congestion, ear pain, sinus pain, sore throat and tinnitus.    Eyes: Negative for blurred vision and photophobia.   Respiratory: Negative for cough, hemoptysis, shortness of breath and wheezing.    Cardiovascular: Negative for chest pain, palpitations, orthopnea, leg swelling and PND.   Gastrointestinal: Negative for abdominal pain, blood in stool, constipation, diarrhea, heartburn, melena, nausea and vomiting.   Genitourinary: Negative for dysuria, frequency and urgency.   Musculoskeletal: Negative for back pain, myalgias and neck pain.   Skin: Negative for rash.   Neurological: Positive for headaches. Negative for dizziness, tremors, seizures, " loss of consciousness and weakness.   Endo/Heme/Allergies: Negative for polydipsia.   Psychiatric/Behavioral: Negative for depression and hallucinations. The patient does not have insomnia.    :     Objective:     Physical Exam  Constitutional:       General: She is not in acute distress.     Appearance: She is not diaphoretic.   Eyes:      Extraocular Movements: Extraocular movements intact.      Pupils: Pupils are equal, round, and reactive to light.   Neck:      Thyroid: No thyromegaly.   Cardiovascular:      Rate and Rhythm: Normal rate and regular rhythm.      Heart sounds: Normal heart sounds. No murmur heard.      Pulmonary:      Effort: Pulmonary effort is normal. No respiratory distress.      Breath sounds: Normal breath sounds. No wheezing.   Chest:      Chest wall: No tenderness.   Abdominal:      General: Bowel sounds are normal. There is no distension.      Palpations: Abdomen is soft.      Tenderness: There is no abdominal tenderness. There is no right CVA tenderness or left CVA tenderness.   Musculoskeletal:         General: No tenderness.   Lymphadenopathy:      Cervical: No cervical adenopathy.   Neurological:      Mental Status: She is alert and oriented to person, place, and time.      Cranial Nerves: No cranial nerve deficit.      Sensory: No sensory deficit.   Psychiatric:         Behavior: Behavior normal.         Thought Content: Thought content normal.         Judgment: Judgment normal.       Assessment:       ICD-10-CM ICD-9-CM   1. Diverticulosis  K57.90 562.10   2. Essential hypertension  I10 401.9   3. Tinea corporis  B35.4 110.5   4. Primary insomnia  F51.01 307.42   5. Morbid obesity  E66.01 278.01       Plan:     Patient was found to have nephrolithiasis and 2 mm on left kidney find in ER at an outside facility  I do not think this pain is secondary to renal stone.  Patient is allergic to multiple pain medication and will use Tylenol for pain.  Patient CT scan also showed diverticuli  and that can also contribute to abdominal pain.  Advised patient to use lot of fiber and avoid constipation.  Patient denies any constipation at this time  Patient blood pressures are running high secondary to missing medication this morning  Advised patient to take medication and come to clinic for blood pressure check in next few days.  Patient is being followed by Dr. Crum for MGUS and extensive workup has been negative  Bone scan showed some increased uptake and 7th rib, discussed with oncologist  He does not think that it is significant for any lytic lesion in the as bone  CT head showed microvascular disease.  Will be aggressive with blood pressure control.   Patient is being followed  by  for MGUS and further workup is being done.  Patient insomnia is improved with Ambien.  Will continue medication.  Advised patient about need to lose weight      Medication List with Changes/Refills   Current Medications    TAMSULOSIN (FLOMAX) 0.4 MG CAP    Take 1 capsule (0.4 mg total) by mouth once daily.   Changed and/or Refilled Medications    Modified Medication Previous Medication    AMLODIPINE (NORVASC) 10 MG TABLET amLODIPine (NORVASC) 10 MG tablet       Take 1 tablet (10 mg total) by mouth once daily.    Take 1 tablet (10 mg total) by mouth once daily.    CARVEDILOL (COREG) 25 MG TABLET carvediloL (COREG) 25 MG tablet       Take 1 tablet (25 mg total) by mouth 2 (two) times daily with meals.    Take 1 tablet (25 mg total) by mouth 2 (two) times daily with meals.    HYDROCHLOROTHIAZIDE (HYDRODIURIL) 25 MG TABLET hydroCHLOROthiazide (HYDRODIURIL) 25 MG tablet       Take 1 tablet (25 mg total) by mouth once daily.    Take 1 tablet (25 mg total) by mouth once daily.    KETOCONAZOLE (NIZORAL) 2 % CREAM ketoconazole (NIZORAL) 2 % cream       Apply topically once daily.    Apply topically once daily.    ZOLPIDEM (AMBIEN) 5 MG TAB zolpidem (AMBIEN) 5 MG Tab       Take 1 tablet (5 mg total) by mouth nightly as  needed (insomnia).    Take 1 tablet (5 mg total) by mouth nightly as needed (insomnia).   Discontinued Medications    SPIRONOLACTONE (ALDACTONE) 50 MG TABLET    Take 1 tablet (50 mg total) by mouth once daily.

## 2022-01-24 ENCOUNTER — TELEPHONE (OUTPATIENT)
Dept: HEMATOLOGY/ONCOLOGY | Facility: CLINIC | Age: 52
End: 2022-01-24
Payer: MEDICARE

## 2022-01-24 ENCOUNTER — PATIENT OUTREACH (OUTPATIENT)
Dept: ADMINISTRATIVE | Facility: HOSPITAL | Age: 52
End: 2022-01-24
Payer: MEDICARE

## 2022-01-24 NOTE — TELEPHONE ENCOUNTER
Patient states that she was told that the provider would discuss her bone scan with her PCP. States that she is having bilateral knee pain rated an 8 out of 10. I let the patient know that I would send the message to the provider but that I suggested she also reach ouit to her PCP to discuss as well. TTRN

## 2022-02-01 ENCOUNTER — CLINICAL SUPPORT (OUTPATIENT)
Dept: PRIMARY CARE CLINIC | Facility: CLINIC | Age: 52
End: 2022-02-01
Payer: MEDICARE

## 2022-02-01 VITALS — HEART RATE: 64 BPM | DIASTOLIC BLOOD PRESSURE: 69 MMHG | SYSTOLIC BLOOD PRESSURE: 109 MMHG

## 2022-02-01 DIAGNOSIS — I10 ESSENTIAL HYPERTENSION: Primary | ICD-10-CM

## 2022-02-01 PROCEDURE — 99211 PR OFFICE/OUTPT VISIT, EST, LEVL I: ICD-10-PCS | Mod: S$GLB,,, | Performed by: INTERNAL MEDICINE

## 2022-02-01 PROCEDURE — 99211 OFF/OP EST MAY X REQ PHY/QHP: CPT | Mod: S$GLB,,, | Performed by: INTERNAL MEDICINE

## 2022-02-01 RX ORDER — SPIRONOLACTONE 50 MG/1
50 TABLET, FILM COATED ORAL 2 TIMES DAILY
Qty: 60 TABLET | Refills: 11 | Status: SHIPPED | OUTPATIENT
Start: 2022-02-01 | End: 2022-05-16 | Stop reason: SDUPTHER

## 2022-02-22 DIAGNOSIS — D84.9 IMMUNOSUPPRESSED STATUS: ICD-10-CM

## 2022-03-10 DIAGNOSIS — F51.01 PRIMARY INSOMNIA: ICD-10-CM

## 2022-03-10 RX ORDER — ZOLPIDEM TARTRATE 5 MG/1
5 TABLET ORAL NIGHTLY PRN
Qty: 30 TABLET | Refills: 0 | Status: SHIPPED | OUTPATIENT
Start: 2022-03-10 | End: 2022-04-11 | Stop reason: SDUPTHER

## 2022-03-21 ENCOUNTER — OFFICE VISIT (OUTPATIENT)
Dept: PRIMARY CARE CLINIC | Facility: CLINIC | Age: 52
End: 2022-03-21
Payer: MEDICARE

## 2022-03-21 VITALS
SYSTOLIC BLOOD PRESSURE: 133 MMHG | HEIGHT: 68 IN | RESPIRATION RATE: 16 BRPM | BODY MASS INDEX: 42.74 KG/M2 | HEART RATE: 81 BPM | WEIGHT: 282 LBS | OXYGEN SATURATION: 98 % | TEMPERATURE: 99 F | DIASTOLIC BLOOD PRESSURE: 84 MMHG

## 2022-03-21 DIAGNOSIS — K43.9 VENTRAL HERNIA WITHOUT OBSTRUCTION OR GANGRENE: ICD-10-CM

## 2022-03-21 DIAGNOSIS — E66.01 MORBID OBESITY: ICD-10-CM

## 2022-03-21 DIAGNOSIS — R10.31 RLQ ABDOMINAL PAIN: ICD-10-CM

## 2022-03-21 DIAGNOSIS — I10 ESSENTIAL HYPERTENSION: Primary | ICD-10-CM

## 2022-03-21 DIAGNOSIS — F51.01 PRIMARY INSOMNIA: ICD-10-CM

## 2022-03-21 PROCEDURE — 99214 OFFICE O/P EST MOD 30 MIN: CPT | Mod: S$GLB,,, | Performed by: INTERNAL MEDICINE

## 2022-03-21 PROCEDURE — 99214 PR OFFICE/OUTPT VISIT, EST, LEVL IV, 30-39 MIN: ICD-10-PCS | Mod: S$GLB,,, | Performed by: INTERNAL MEDICINE

## 2022-03-21 NOTE — PROGRESS NOTES
Subjective:      Patient ID: Nancy Sun is a 51 y.o. female.    Chief Complaint: Abdominal Pain (Pt went to [facility] 3-8-22... pt was told she had hernia and needed to see a specialist ASAP)     Patient with h/o HTN, home blood pressures are not being monitored.  Patient reports compliance with medication.   Patient serum metanephrines were slightly high and urine metanephrines were normal.  Patient denies any chest pain, shortness of breath, ankle swelling.  Blood pressure seem under okay control    Patient recently went to [facility] secondary to abdominal pain.  Pain was in right lower quadrant + constant + no exacerbating or relieving factors known + 9/10 in intensity + patient denies any constipation + obstipation.     Patient has history of MCGUS and is being followed by Dr. Crum.        Review of Systems   Constitutional: Negative for chills, diaphoresis, fever, malaise/fatigue and weight loss.   HENT: Negative for congestion, ear pain, sinus pain, sore throat and tinnitus.    Eyes: Negative for blurred vision and photophobia.   Respiratory: Negative for cough, hemoptysis, shortness of breath and wheezing.    Cardiovascular: Negative for chest pain, palpitations, orthopnea, leg swelling and PND.   Gastrointestinal: Positive for abdominal pain. Negative for blood in stool, constipation, diarrhea, heartburn, melena, nausea and vomiting.   Genitourinary: Negative for dysuria, frequency and urgency.   Musculoskeletal: Negative for back pain, myalgias and neck pain.   Skin: Negative for rash.   Neurological: Positive for headaches. Negative for dizziness, tremors, seizures, loss of consciousness and weakness.   Endo/Heme/Allergies: Negative for polydipsia.   Psychiatric/Behavioral: Negative for depression and hallucinations. The patient does not have insomnia.    :     Objective:     Physical Exam  Constitutional:       General: She is not in acute distress.     Appearance: She is not  diaphoretic.   Eyes:      Extraocular Movements: Extraocular movements intact.      Pupils: Pupils are equal, round, and reactive to light.   Neck:      Thyroid: No thyromegaly.   Cardiovascular:      Rate and Rhythm: Normal rate and regular rhythm.      Heart sounds: Normal heart sounds. No murmur heard.  Pulmonary:      Effort: Pulmonary effort is normal. No respiratory distress.      Breath sounds: Normal breath sounds. No wheezing.   Chest:      Chest wall: No tenderness.   Abdominal:      General: Bowel sounds are normal. There is no distension.      Palpations: Abdomen is soft.      Tenderness: There is no abdominal tenderness. There is no right CVA tenderness or left CVA tenderness.      Comments: Patient complained of tenderness in The LUQ and RLQ on palpation. But later when listening to Bowel sounds the Area was pressed hard with Stethoscope no pain was expressed by patient and she looked comfortable.    Musculoskeletal:         General: No tenderness.   Lymphadenopathy:      Cervical: No cervical adenopathy.   Neurological:      Mental Status: She is alert and oriented to person, place, and time.      Cranial Nerves: No cranial nerve deficit.      Sensory: No sensory deficit.   Psychiatric:         Behavior: Behavior normal.         Thought Content: Thought content normal.         Judgment: Judgment normal.       Assessment:       ICD-10-CM ICD-9-CM   1. Essential hypertension  I10 401.9   2. Primary insomnia  F51.01 307.42   3. Morbid obesity  E66.01 278.01   4. RLQ abdominal pain  R10.31 789.03       Plan:     Patient blood pressure seem under okay control.  Will continue same medication  Patient has abdominal pain and had multiple ER visit.  Last [facility] visit suggested abdominal hernia on a CT scan  Will get reports from [facility].   Will refer to general surgery for further evaluation.   Patient insomnia is improved with Ambien.  Will continue medication.  Advised patient about need  to lose weight.  Minimize carbohydrate intake, avoid snacks between meals  Walk 20 minutes/day.       Medication List with Changes/Refills   Current Medications    AMLODIPINE (NORVASC) 10 MG TABLET    Take 1 tablet (10 mg total) by mouth once daily.    CARVEDILOL (COREG) 25 MG TABLET    Take 1 tablet (25 mg total) by mouth 2 (two) times daily with meals.    HYDROCHLOROTHIAZIDE (HYDRODIURIL) 25 MG TABLET    Take 1 tablet (25 mg total) by mouth once daily.    KETOCONAZOLE (NIZORAL) 2 % CREAM    Apply topically once daily.    SPIRONOLACTONE (ALDACTONE) 50 MG TABLET    Take 1 tablet (50 mg total) by mouth 2 (two) times daily.    ZOLPIDEM (AMBIEN) 5 MG TAB    Take 1 tablet (5 mg total) by mouth nightly as needed (insomnia).

## 2022-04-01 ENCOUNTER — TELEPHONE (OUTPATIENT)
Dept: PRIMARY CARE CLINIC | Facility: CLINIC | Age: 52
End: 2022-04-01

## 2022-04-01 NOTE — TELEPHONE ENCOUNTER
----- Message from Aaliyah Morfin LPN sent at 3/28/2022  4:45 PM CDT -----  Regarding: FW: Referral  Contact: patient    ----- Message -----  From: Aundrea Kohli  Sent: 3/28/2022  11:52 AM CDT  To: [facility]di [facility] Staff  Subject: Referral                                         Per phone call with patient, she wanted to know if the physician has referred her to a hernia physician and to set up an appointment.  Please return call at [phone] (home).    Thanks,  SJ

## 2022-04-11 ENCOUNTER — TELEPHONE (OUTPATIENT)
Dept: OBSTETRICS AND GYNECOLOGY | Facility: CLINIC | Age: 52
End: 2022-04-11
Payer: MEDICARE

## 2022-04-11 ENCOUNTER — OFFICE VISIT (OUTPATIENT)
Dept: PRIMARY CARE CLINIC | Facility: CLINIC | Age: 52
End: 2022-04-11
Payer: MEDICARE

## 2022-04-11 VITALS
BODY MASS INDEX: 43.04 KG/M2 | RESPIRATION RATE: 16 BRPM | WEIGHT: 284 LBS | DIASTOLIC BLOOD PRESSURE: 85 MMHG | OXYGEN SATURATION: 96 % | HEART RATE: 88 BPM | SYSTOLIC BLOOD PRESSURE: 138 MMHG | TEMPERATURE: 98 F | HEIGHT: 68 IN

## 2022-04-11 DIAGNOSIS — I26.99 ACUTE PULMONARY EMBOLISM WITHOUT ACUTE COR PULMONALE, UNSPECIFIED PULMONARY EMBOLISM TYPE: ICD-10-CM

## 2022-04-11 DIAGNOSIS — I10 ESSENTIAL HYPERTENSION: ICD-10-CM

## 2022-04-11 DIAGNOSIS — R06.2 WHEEZING: Primary | ICD-10-CM

## 2022-04-11 DIAGNOSIS — F51.01 PRIMARY INSOMNIA: ICD-10-CM

## 2022-04-11 PROBLEM — R60.0 PERIPHERAL EDEMA: Status: RESOLVED | Noted: 2019-07-11 | Resolved: 2022-04-11

## 2022-04-11 PROCEDURE — 99214 OFFICE O/P EST MOD 30 MIN: CPT | Mod: S$GLB,,, | Performed by: INTERNAL MEDICINE

## 2022-04-11 PROCEDURE — 99214 PR OFFICE/OUTPT VISIT, EST, LEVL IV, 30-39 MIN: ICD-10-PCS | Mod: S$GLB,,, | Performed by: INTERNAL MEDICINE

## 2022-04-11 RX ORDER — ALBUTEROL SULFATE 90 UG/1
2 AEROSOL, METERED RESPIRATORY (INHALATION) EVERY 6 HOURS PRN
Qty: 18 G | Refills: 0 | Status: SHIPPED | OUTPATIENT
Start: 2022-04-11 | End: 2022-06-15

## 2022-04-11 RX ORDER — ZOLPIDEM TARTRATE 5 MG/1
5 TABLET ORAL NIGHTLY PRN
Qty: 30 TABLET | Refills: 0 | Status: SHIPPED | OUTPATIENT
Start: 2022-04-11 | End: 2022-05-16

## 2022-04-11 NOTE — PROGRESS NOTES
Subjective:      Patient ID: Nancy Sun is a 51 y.o. female.    Chief Complaint: Follow-up (F/u from [facility] ER d/c on 04/12/22 for PE)     Patient with h/o HTN, home blood pressures are not being monitored.  Patient reports compliance with medication.   Patient serum metanephrines were slightly high and urine metanephrines were normal.  Patient denies any chest pain, shortness of breath, ankle swelling.  Blood pressure seem under okay control    Patient recently went to [facility] secondary to abdominal pain + chest pain.  Patient went to [facility] where imaging studies showed PE patient was started on Xarelto.  CTA chest with contrast showed a tiny focus of acute, nonocclusive pulmonary embolus with the anterior basilar segment of the right lower lobe. Patient denies any more chest pain but still has some shortness of breath, mild cough and wheezing.     Patient has history of MCGUS and is being followed by Dr. Crum.        Review of Systems   Constitutional: Negative for chills, diaphoresis, fever, malaise/fatigue and weight loss.   HENT: Negative for congestion, ear pain, sinus pain, sore throat and tinnitus.    Eyes: Negative for blurred vision and photophobia.   Respiratory: Negative for cough, hemoptysis, shortness of breath and wheezing.    Cardiovascular: Negative for chest pain, palpitations, orthopnea, leg swelling and PND.   Gastrointestinal: Positive for abdominal pain. Negative for blood in stool, constipation, diarrhea, heartburn, melena, nausea and vomiting.   Genitourinary: Negative for dysuria, frequency and urgency.   Musculoskeletal: Negative for back pain, myalgias and neck pain.   Skin: Negative for rash.   Neurological: Positive for headaches. Negative for dizziness, tremors, seizures, loss of consciousness and weakness.   Endo/Heme/Allergies: Negative for polydipsia.   Psychiatric/Behavioral: Negative for depression and hallucinations. The patient does not  have insomnia.    :     Objective:     Physical Exam  Constitutional:       General: She is not in acute distress.     Appearance: She is not diaphoretic.   Eyes:      Extraocular Movements: Extraocular movements intact.      Pupils: Pupils are equal, round, and reactive to light.   Neck:      Thyroid: No thyromegaly.   Cardiovascular:      Rate and Rhythm: Normal rate and regular rhythm.      Heart sounds: Normal heart sounds. No murmur heard.  Pulmonary:      Effort: Pulmonary effort is normal. No respiratory distress.      Breath sounds: Normal breath sounds. No wheezing.   Chest:      Chest wall: No tenderness.   Abdominal:      General: Bowel sounds are normal. There is no distension.      Palpations: Abdomen is soft.      Tenderness: There is no abdominal tenderness. There is no right CVA tenderness or left CVA tenderness.   Musculoskeletal:         General: No tenderness.   Lymphadenopathy:      Cervical: No cervical adenopathy.   Neurological:      Mental Status: She is alert and oriented to person, place, and time.      Cranial Nerves: No cranial nerve deficit.      Sensory: No sensory deficit.   Psychiatric:         Behavior: Behavior normal.         Thought Content: Thought content normal.         Judgment: Judgment normal.       Assessment:       ICD-10-CM ICD-9-CM   1. Wheezing  R06.2 786.07   2. Acute pulmonary embolism without acute cor pulmonale, unspecified pulmonary embolism type  I26.99 415.19   3. Essential hypertension  I10 401.9       Plan:     Patient blood pressure seem under okay control.  Will continue medication.  CTA showed nonobstructive emboli.  Patient started on Xarelto will continue medication  Advised patient about possible side effect of the medication including increased chances of bleeding  Patient complains of some shortness of breath cold recent.  Will use albuterol inhaler  Patient insomnia is improved with Ambien.  Will continue medication.      Medication List with  Changes/Refills   New Medications    ALBUTEROL (PROAIR HFA) 90 MCG/ACTUATION INHALER    Inhale 2 puffs into the lungs every 6 (six) hours as needed for Wheezing. Rescue   Current Medications    AMLODIPINE (NORVASC) 10 MG TABLET    Take 1 tablet (10 mg total) by mouth once daily.    CARVEDILOL (COREG) 25 MG TABLET    Take 1 tablet (25 mg total) by mouth 2 (two) times daily with meals.    HYDROCHLOROTHIAZIDE (HYDRODIURIL) 25 MG TABLET    Take 1 tablet (25 mg total) by mouth once daily.    RIVAROXABAN (XARELTO DVT-PE TREAT 30D START ORAL)    Take 1 tablet by mouth.    SPIRONOLACTONE (ALDACTONE) 50 MG TABLET    Take 1 tablet (50 mg total) by mouth 2 (two) times daily.    ZOLPIDEM (AMBIEN) 5 MG TAB    Take 1 tablet (5 mg total) by mouth nightly as needed (insomnia).

## 2022-04-11 NOTE — TELEPHONE ENCOUNTER
I spoke with pt and we believe the call center sent us the message by accident. She was needing Dr. fonseca's office.

## 2022-04-11 NOTE — TELEPHONE ENCOUNTER
----- Message from Aundrea Kohli sent at 4/11/2022  8:07 AM CDT -----  Regarding: Fasting  Contact: patient  Type:  Patient Returning Call    Who Called: Nancy   Who Left Message for Patient: nurse   Does the patient know what this is regarding?:yes,fasting   Would the patient rather a call back or a response via [facility]? Call back   Best Call Back Number: [phone] (home)     Additional Information:       JOSTIN Post

## 2022-04-11 NOTE — TELEPHONE ENCOUNTER
----- Message from Aundrea Seun sent at 4/11/2022  8:10 AM CDT -----  Regarding: Same Day Appointment  Contact: patient  Type:  Same Day Appointment Request    Caller is requesting a same day appointment.  Caller declined first available appointment listed below.    Name of Caller:Nancy   When is the first available appointment? 04/12/2022  Symptoms: Blood clot in left side of arteries  Best Call Back Number: [phone] (home)     Additional Information: The caller indicated that she went to the emergency room yesterday and that she needed to get with her physician.    Thanks,  SJ

## 2022-04-11 NOTE — TELEPHONE ENCOUNTER
----- Message from Aundrea Seun sent at 4/11/2022  2:49 PM CDT -----  Regarding: Refill  Contact: patient  Type:  RX Refill Request    Who Called: Nancy  Refill or New Rx:  Refill   RX Name and Strength:zolpidem (AMBIEN) 5 MG Tab  How is the patient currently taking it? (ex. 1XDay):daily   Is this a 30 day or 90 day RX: 30  Preferred Pharmacy with phone number:  [id number] [address] [address]  Phone: [phone] Fax: [phone]      Local or Mail Order:local   Ordering Provider: Dr Hanna   Would the patient rather a call back or a response via [facility]?  Call back   Best Call Back Number: [phone] (home)     Additional Information:     JOSTIN Post

## 2022-04-11 NOTE — TELEPHONE ENCOUNTER
Went to [facility] ER for upset stomach, chest pain, had a CT, was given injections into stomach, Lovanox? Given script for Eliquis and told to f/u with PCP. Patient states she did not start Eliquis yet. Patient was advised to follow advise from ER until Dr. Hanna can eval her. Patient will p/u medication today. Record request sent to [facility] as STAT for ER notes and imaging/lab reports.

## 2022-04-22 ENCOUNTER — NURSE TRIAGE (OUTPATIENT)
Dept: ADMINISTRATIVE | Facility: CLINIC | Age: 52
End: 2022-04-22
Payer: MEDICARE

## 2022-04-22 ENCOUNTER — TELEPHONE (OUTPATIENT)
Dept: FAMILY MEDICINE | Facility: CLINIC | Age: 52
End: 2022-04-22

## 2022-04-22 NOTE — TELEPHONE ENCOUNTER
Pt called for c/o headache and she said that its her worst one yet and its on the right side and she hs taken tylenol and advil today with It not getting better. Pt triaged and care advice to send to ED or PCp but 4pm and will send to the Ed,I told pt to call if when she gets back if she has another or any weakness to reach out    Reason for Disposition   SEVERE headache, states 'worst headache' of life    Additional Information   Negative: Difficult to awaken or acting confused (e.g., disoriented, slurred speech)   Negative: Weakness of the face, arm or leg on one side of the body and new-onset   Negative: Numbness of the face, arm or leg on one side of the body and new-onset   Negative: Loss of speech or garbled speech and new-onset   Negative: Passed out (i.e., fainted, collapsed and was not responding)   Negative: Sounds like a life-threatening emergency to the triager   Negative: Unable to walk without falling   Negative: Stiff neck (can't touch chin to chest)   Negative: Possibility of carbon monoxide exposure    Protocols used: HEADACHE-A-OH

## 2022-04-22 NOTE — TELEPHONE ENCOUNTER
----- Message from Yamila Reza sent at 4/22/2022  3:55 PM CDT -----  Pt calling to speak with nurse, states she has a really bad heachache and has taken meds and is not getting any relief. Transferred to on call nurse but would like a call back. Call back is [phone]

## 2022-04-22 NOTE — TELEPHONE ENCOUNTER
1:55PM - Incoming call disconnected after transfer to Triage RN.    2:01PM - Return call made to patient and voice message left at this time.    2:08PM - Return call made to patient and voice message left at this time.    2:15PM - Final outreach call made to patient at this time.     Reason for Disposition   Third attempt to contact caller AND no contact made. Phone number verified.    Additional Information   Negative: Caller has already spoken with the PCP (or office), and has no further questions   Negative: Caller has already spoken with another triager and has no further questions   Negative: Caller has already spoken with another triager or PCP (or office), and has further questions and triager able to answer questions.   Negative: Busy signal.  First attempt to contact caller.  Follow-up call scheduled within 15 minutes.   Negative: No answer.  First attempt to contact caller.  Follow-up call scheduled within 15 minutes.   Negative: Message left on identified voicemail   Negative: Second attempt to contact caller AND no contact made. Phone number verified.   Negative: Wrong number reached. Phone number verified.   Negative: Message left with person in household   Negative: Message left on unidentified voice mail. Phone number verified.    Protocols used: NO CONTACT OR DUPLICATE CONTACT CALL-A-OH

## 2022-05-11 ENCOUNTER — TELEPHONE (OUTPATIENT)
Dept: PRIMARY CARE CLINIC | Facility: CLINIC | Age: 52
End: 2022-05-11

## 2022-05-11 DIAGNOSIS — I26.99 ACUTE PULMONARY EMBOLISM WITHOUT ACUTE COR PULMONALE, UNSPECIFIED PULMONARY EMBOLISM TYPE: Primary | ICD-10-CM

## 2022-05-11 NOTE — TELEPHONE ENCOUNTER
----- Message from Erin Desouza sent at 5/11/2022 12:14 PM CDT -----  Regarding: appt  She had an appt TODAY and no showed to it.  He will not be able to accommodate her before June.    ----- Message -----  From: Yudelka Soto  Sent: 5/11/2022  12:06 PM CDT  To: Cyndi Hernandez Staff    Type:  Sooner Apoointment Request    Caller is requesting a sooner appointment.  Caller declined first available appointment listed below.  Caller will not accept being placed on the waitlist and is requesting a message be sent to doctor.  Name of Caller: Nancy Sun  When is the first available appointment? 06/21  Symptoms: 1 month fu   Would the patient rather a call back or a response via [facility]? Call back   Best Call Back Number: [phone] (home)   Additional Information: Pt stated that she can not wait until 06/21 to be seen, she said she needs a refill on her bp medication.

## 2022-05-11 NOTE — TELEPHONE ENCOUNTER
Returning call to patient, JAMES. Regarding no available appts until 06/2022. Patient had appt for today but no showed.

## 2022-05-16 DIAGNOSIS — I10 ESSENTIAL HYPERTENSION: ICD-10-CM

## 2022-05-16 DIAGNOSIS — F51.01 PRIMARY INSOMNIA: ICD-10-CM

## 2022-05-16 RX ORDER — ZOLPIDEM TARTRATE 5 MG/1
TABLET ORAL
Qty: 30 TABLET | Refills: 0 | Status: SHIPPED | OUTPATIENT
Start: 2022-05-16 | End: 2022-06-13 | Stop reason: SDUPTHER

## 2022-05-16 RX ORDER — SPIRONOLACTONE 50 MG/1
50 TABLET, FILM COATED ORAL 2 TIMES DAILY
Qty: 60 TABLET | Refills: 11 | Status: SHIPPED | OUTPATIENT
Start: 2022-05-16 | End: 2022-07-12 | Stop reason: SDUPTHER

## 2022-05-16 NOTE — TELEPHONE ENCOUNTER
----- Message from Roxann Hicks sent at 5/16/2022  2:46 PM CDT -----  Contact: self  Pt calling for refill on spironolactone (ALDACTONE) 50 MG Pt can be reached at [phone]       [id number] [address]  Phone: [phone] Fax: [phone]    Thanks,

## 2022-05-16 NOTE — TELEPHONE ENCOUNTER
----- Message from Ingrid Cason sent at 5/16/2022  8:04 AM CDT -----  Regarding: SAME DAY SOLOMON  Patient calling for same day solomon,   need medication refilled blood thinner . Call back number [phone]. Tks

## 2022-06-09 ENCOUNTER — TELEPHONE (OUTPATIENT)
Dept: HEMATOLOGY/ONCOLOGY | Facility: CLINIC | Age: 52
End: 2022-06-09
Payer: MEDICARE

## 2022-06-09 ENCOUNTER — OFFICE VISIT (OUTPATIENT)
Dept: PRIMARY CARE CLINIC | Facility: CLINIC | Age: 52
End: 2022-06-09
Payer: MEDICARE

## 2022-06-09 VITALS
WEIGHT: 281 LBS | DIASTOLIC BLOOD PRESSURE: 74 MMHG | OXYGEN SATURATION: 98 % | HEART RATE: 64 BPM | BODY MASS INDEX: 42.73 KG/M2 | SYSTOLIC BLOOD PRESSURE: 114 MMHG

## 2022-06-09 DIAGNOSIS — E66.01 CLASS 3 SEVERE OBESITY WITH BODY MASS INDEX (BMI) OF 40.0 TO 44.9 IN ADULT, UNSPECIFIED OBESITY TYPE, UNSPECIFIED WHETHER SERIOUS COMORBIDITY PRESENT: ICD-10-CM

## 2022-06-09 DIAGNOSIS — D47.2 SMOLDERING MYELOMA: Primary | ICD-10-CM

## 2022-06-09 DIAGNOSIS — F51.01 PRIMARY INSOMNIA: ICD-10-CM

## 2022-06-09 DIAGNOSIS — Z12.11 SCREENING FOR COLON CANCER: ICD-10-CM

## 2022-06-09 DIAGNOSIS — K62.5 RECTAL BLEEDING: ICD-10-CM

## 2022-06-09 PROCEDURE — 99214 PR OFFICE/OUTPT VISIT, EST, LEVL IV, 30-39 MIN: ICD-10-PCS | Mod: S$GLB,,, | Performed by: INTERNAL MEDICINE

## 2022-06-09 PROCEDURE — 99214 OFFICE O/P EST MOD 30 MIN: CPT | Mod: S$GLB,,, | Performed by: INTERNAL MEDICINE

## 2022-06-09 RX ORDER — FAMOTIDINE 20 MG/1
20 TABLET, FILM COATED ORAL 2 TIMES DAILY
COMMUNITY
Start: 2022-06-05 | End: 2022-06-15

## 2022-06-09 RX ORDER — ZOLPIDEM TARTRATE 5 MG/1
5 TABLET ORAL NIGHTLY PRN
Qty: 30 TABLET | Refills: 0 | Status: CANCELLED | OUTPATIENT
Start: 2022-06-09

## 2022-06-09 NOTE — PROGRESS NOTES
"Subjective:      Patient ID: Nancy Sun is a 51 y.o. female.    Chief Complaint: Hypertension (Took all her meds this am, ), Referral (GI for colonoscopy), Rectal Bleeding (Occurred for one month starting last month and this month. Bright red. ), Fatigue (Almost a month; no energy in the am and "feeling so weak." ), and Back Pain (Middle to lower(acorss)part of her back. For the past couple of months. No pain today. Numbness tingles to the B/L hand and feet. OTC advil and tylenol. )    HPI     ER visit June 2nd    51-year-old black female history of hypertension, obesity, GERD.  Patient started with chest pain rating up into her neck, midline only, 11 AM, approximately 4 and half hours prior to arrival.  States pain is constant and unchanged not radiate is not exertional.  No shortness of breath no diaphoresis no cough.  Good cardiac work-up high-sensitivity troponin which should be very sensitive being drawn greater than 3 hours from onset    Work-up unremarkable symptoms improved with IV Pepcid discharged on oral antiacid medication and follow-up      EKG :       Time:  15:38     Rate:  84     EKG Rhythm:  Normal sinus rhythm, Normal QRS, Normal ST/T, Normal QTc, No STEMI      6/2/22 16:05:   Red Blood Count 4.48, Mean Corpuscular Volume 91.1, Mean Corpuscular Hemoglobin 28.3, Mean Corpuscular Hemoglobin Concent 31.1, Red Cell Distribution Width 13.5, Neutrophils (%) (Auto) 60.5, Lymphocytes (%) (Auto) 27.8, Monocytes (%) (Auto) 8.7, Eosinophils (%) (Auto) 2.0, Basophils (%) (Auto) 0.5, Neutrophils # (Auto) 4.84, Nucleated Red Blood Cells % 0.0, Carbon Dioxide Level 29, Anion Gap 8.0, Estimat Glomerular Filtration Rate 56, BUN/Creatinine Ratio 17.21, Calcium Level 9.5, Total Bilirubin 0.7, Aspartate Amino Transf (AST/SGOT) 21, Alanine Aminotransferase (ALT/SGPT) 38, Alkaline Phosphatase 157, Troponin I High Sensitivity 27.5, Pro-B-Type Natriuretic Peptide 24, Total Protein 8.3, Albumin 4.1, Globulin 4.2, " Albumin/Globulin Ratio 0.976      FINDINGS: AP view of the chest is compared to previous study dated January 25, 2022. Left subclavian venous access port is noted in place unchanged in position. Port catheter is looped over the left clavicular head with the tip projecting in expected location of the left brachiocephalic/superior vena cava junction unchanged. No focal infiltrate or pleural effusion is seen. Cardiac silhouette is upper limits of normal in size. Bony thorax appears intact.    IMPRESSION:  1. No acute cardiopulmonary change seen. Stable interval appearance.          Patient states she noticed some blood in her rectum, she states when she wipes she has blood and is very red, she is on xarelto for a prior PE and she has been on it for about a month. She went to the ER on June 2nd for chest pain and all lab work and imaging was negative.     She states she comes to Dr Hanna for her myeloma labs and does not see Dr Crum anymore    Reports back pain issues which she attributes to her myeloma. In told her she would need to see an oncologist, which would be Dr Crum, she states she will continue to see him.     Not due for her ambien yet      Review of Systems   Constitutional: Negative for chills, fever and weight loss.   Respiratory: Negative for cough, shortness of breath and wheezing.    Cardiovascular: Negative for chest pain, palpitations and leg swelling.   Gastrointestinal: Positive for blood in stool. Negative for abdominal pain, heartburn, nausea and vomiting.   Genitourinary: Negative for dysuria, frequency and urgency.   Musculoskeletal: Negative for falls.   Skin: Negative for rash.   Neurological: Negative for dizziness and headaches.     Objective:     Physical Exam  Vitals reviewed.   Constitutional:       Appearance: Normal appearance. She is obese.   HENT:      Head: Normocephalic.   Eyes:      Extraocular Movements: Extraocular movements intact.      Conjunctiva/sclera: Conjunctivae  normal.      Pupils: Pupils are equal, round, and reactive to light.   Cardiovascular:      Rate and Rhythm: Normal rate and regular rhythm.   Pulmonary:      Effort: Pulmonary effort is normal.      Breath sounds: Normal breath sounds.   Abdominal:      General: Bowel sounds are normal.   Musculoskeletal:         General: Normal range of motion.      Right lower leg: No edema.      Left lower leg: No edema.      Comments: Old scar on right knee from prior surgery, negative straight leg test   Skin:     General: Skin is warm.      Capillary Refill: Capillary refill takes less than 2 seconds.   Neurological:      General: No focal deficit present.      Mental Status: She is alert and oriented to person, place, and time.   Psychiatric:         Mood and Affect: Mood normal.        /74 (BP Location: Right arm, Patient Position: Sitting, BP Method: Large (Automatic))   Pulse 64   Wt 127.5 kg (281 lb)   SpO2 98%   BMI 42.73 kg/m²     Assessment:       ICD-10-CM ICD-9-CM   1. Smoldering myeloma  C90.00 203.00   2. Primary insomnia  F51.01 307.42   3. Class 3 severe obesity with body mass index (BMI) of 40.0 to 44.9 in adult, unspecified obesity type, unspecified whether serious comorbidity present  E66.01 278.01    Z68.41 V85.41   4. Screening for colon cancer  Z12.11 V76.51   5. Rectal bleeding  K62.5 569.3       Plan:     Medication List with Changes/Refills   Current Medications    ALBUTEROL (PROAIR HFA) 90 MCG/ACTUATION INHALER    Inhale 2 puffs into the lungs every 6 (six) hours as needed for Wheezing. Rescue    AMLODIPINE (NORVASC) 10 MG TABLET    Take 1 tablet (10 mg total) by mouth once daily.    CARVEDILOL (COREG) 25 MG TABLET    Take 1 tablet (25 mg total) by mouth 2 (two) times daily with meals.    FAMOTIDINE (PEPCID) 20 MG TABLET    Take 20 mg by mouth 2 (two) times daily.    HYDROCHLOROTHIAZIDE (HYDRODIURIL) 25 MG TABLET    Take 1 tablet (25 mg total) by mouth once daily.    RIVAROXABAN (XARELTO) 20  MG TAB    Take 1 tablet (20 mg total) by mouth daily with dinner or evening meal.    SPIRONOLACTONE (ALDACTONE) 50 MG TABLET    Take 1 tablet (50 mg total) by mouth 2 (two) times daily.    ZOLPIDEM (AMBIEN) 5 MG TAB    TAKE ONE TABLET BY MOUTH AT BEDTIME AS NEEDED        Smoldering myeloma  -     Cancel: Ambulatory referral/consult to Hematology / Oncology; Future; Expected date: 06/16/2022  -     X-Ray Lumbar Spine 5 View; Future; Expected date: 06/09/2022  -     Ambulatory referral/consult to Hematology / Oncology; Future; Expected date: 06/16/2022    Primary insomnia    Class 3 severe obesity with body mass index (BMI) of 40.0 to 44.9 in adult, unspecified obesity type, unspecified whether serious comorbidity present    Screening for colon cancer  -     Cancel: Ambulatory referral/consult to General Surgery; Future; Expected date: 06/16/2022    Rectal bleeding  -     Ambulatory referral/consult to Gastroenterology; Future; Expected date: 06/16/2022  -     Fecal Immunochemical Test (iFOBT); Future; Expected date: 06/09/2022           I do not think Dr Lemons office takes secondary medicaid but she can call their office to find out, otherwise she would need to go to Dr Augustin, I suspect hemorrhoids or anal fissure, not enough bleeding to cause anemia

## 2022-06-09 NOTE — TELEPHONE ENCOUNTER
----- Message from Jeanette Singletary sent at 6/9/2022  2:27 PM CDT -----  pt needs zolpidem (AMBIEN) 5 MG sent in today also, discussed at appt..[phone] (home)

## 2022-06-09 NOTE — TELEPHONE ENCOUNTER
Spoke to the patient regarding re-establishing care in the clinic. Appointment time, date, and location provided. All questions answered. TTRN

## 2022-06-10 DIAGNOSIS — F51.01 PRIMARY INSOMNIA: ICD-10-CM

## 2022-06-10 RX ORDER — ZOLPIDEM TARTRATE 5 MG/1
5 TABLET ORAL NIGHTLY PRN
Qty: 30 TABLET | Refills: 0 | Status: CANCELLED | OUTPATIENT
Start: 2022-06-10

## 2022-06-10 RX ORDER — ZOLPIDEM TARTRATE 5 MG/1
5 TABLET ORAL NIGHTLY PRN
Qty: 30 TABLET | Refills: 0 | OUTPATIENT
Start: 2022-06-10

## 2022-06-10 NOTE — TELEPHONE ENCOUNTER
----- Message from Roxann Hicks sent at 6/10/2022  1:43 PM CDT -----  Contact: self  Pt calling about refill for zolpidem (AMBIEN) 5 MG Pt can be reached at [phone].  Pt calling about blood in stool. Pt request call back.       [id number] [address]  Phone: [phone] Fax: [phone]    Thanks,

## 2022-06-10 NOTE — TELEPHONE ENCOUNTER
If patient is having blood in stool patient needs to go to ER for further evaluation.  Patient last Ambien was filled on 05/16/2022 and the next refill is on 06/15/2022.   Please request patient to request a refill 1-2 days before she runs out of the medication.

## 2022-06-10 NOTE — TELEPHONE ENCOUNTER
----- Message from Halima Clements sent at 6/10/2022 11:33 AM CDT -----  Contact: SELF  Type:  RX Refill Request    Who Called: Nancy Sun   Refill or New Rx:  REFILL  RX Name and Strength: zolpidem (AMBIEN) 5 MG Tab  How is the patient currently taking it? (ex. 1XDay): 1 DAILY  Is this a 30 day or 90 day RX: 30 DAY    Preferred Pharmacy with phone number:   [id number] [address]  Phone: [phone] Fax: [phone]     Local or Mail Order: LOCAL  Ordering Provider: NIKKI  Would the patient rather a call back or a response via [facility]?  CALL BACK   Best Call Back Number: [phone]   Additional Information: N/A

## 2022-06-13 DIAGNOSIS — F51.01 PRIMARY INSOMNIA: ICD-10-CM

## 2022-06-13 NOTE — TELEPHONE ENCOUNTER
----- Message from Roxann Hicks sent at 6/13/2022  8:28 AM CDT -----  Contact: self  Pt calling for refill on zolpidem (AMBIEN) 5 MG Pt has been calling since 6/9/2022 when she say Wei pt stated he requested the refill and has been calling the office for that refill.   Pt can be reached at [phone]   Thanks,

## 2022-06-13 NOTE — TELEPHONE ENCOUNTER
----- Message from Roxann Hicks sent at 6/13/2022  8:28 AM CDT -----  Contact: self  Pt calling for refill on zolpidem (AMBIEN) 5 MG Pt has been calling since 6/9/2022 when she say Wei pt stated he requested the refill and has been calling the office for that refill.   Pt can be reached at [phone]   Thanks,

## 2022-06-14 RX ORDER — ZOLPIDEM TARTRATE 5 MG/1
5 TABLET ORAL NIGHTLY PRN
Qty: 30 TABLET | Refills: 0 | Status: SHIPPED | OUTPATIENT
Start: 2022-06-14 | End: 2022-07-18 | Stop reason: SDUPTHER

## 2022-06-14 NOTE — TELEPHONE ENCOUNTER
Patient has been requesting for Ambien for multiple times and has been declined as it was not time to refill the medication.  My staff has called multiple time to reach patient and inform her that the medicine will be fill a day or 2 before the due date.     Patient has talked to at least 3 of my staff and all of them reported patient was rude.  To one of the staff patient reported that she is out of Ambien for more than a week while to other  She reported that she is left the 2-3 tablets.     Called Patient to discuss if there is anything that can be odne to improve patient care but she did not  the phone and I left message requesting a call back.

## 2022-06-14 NOTE — TELEPHONE ENCOUNTER
Answered call from Nancy Sun regarding refill on Ambien, patient was very angry and demanding her refill. Stated I was rude for not allowing her to speak. Patient was asking me multiple questions and pausing... when I tried to answer she would start talking over me and repeating the same thing over again. Patient was advised that I have to allow Dr. Hanna 24-48 hours to respond to the refill request from the pharmacy. Patient continued screaming.

## 2022-06-15 ENCOUNTER — OFFICE VISIT (OUTPATIENT)
Dept: HEMATOLOGY/ONCOLOGY | Facility: CLINIC | Age: 52
End: 2022-06-15
Payer: MEDICARE

## 2022-06-15 VITALS
SYSTOLIC BLOOD PRESSURE: 112 MMHG | RESPIRATION RATE: 18 BRPM | HEART RATE: 74 BPM | OXYGEN SATURATION: 92 % | WEIGHT: 282.5 LBS | DIASTOLIC BLOOD PRESSURE: 72 MMHG | HEIGHT: 68 IN | BODY MASS INDEX: 42.81 KG/M2

## 2022-06-15 DIAGNOSIS — D47.2 SMOLDERING MYELOMA: Primary | ICD-10-CM

## 2022-06-15 DIAGNOSIS — D47.2 SMOLDERING MYELOMA: ICD-10-CM

## 2022-06-15 PROCEDURE — 99214 OFFICE O/P EST MOD 30 MIN: CPT | Mod: S$GLB,,, | Performed by: INTERNAL MEDICINE

## 2022-06-15 PROCEDURE — 99214 PR OFFICE/OUTPT VISIT, EST, LEVL IV, 30-39 MIN: ICD-10-PCS | Mod: S$GLB,,, | Performed by: INTERNAL MEDICINE

## 2022-06-15 RX ORDER — FAMOTIDINE 20 MG/1
20 TABLET, FILM COATED ORAL 2 TIMES DAILY
COMMUNITY
End: 2022-07-12 | Stop reason: SDUPTHER

## 2022-06-15 NOTE — PROGRESS NOTES
HEMATOLOGY FOLLOW UP CONSULTATION NOTE    Patient ID: Nancy Sun is a 51 y.o. female.    Chief Complaint: Monoclonal Gammopathy, suspicion for Myeloma    HPI:  Patient is a 51-year-old female with past medical history of hypertension, morbid obesity, insomnia, hypergammaglobulinemia, gastroesophageal reflux disease without esophagitis, chronic idiopathic constipation who was referred by her primary care provider after she was noted to have elevated proteins and further workup including urinalysis showed  increased urinary kappa and lambda chains.  She presents to our clinic today for further evaluation along with her mother.  Voices no acute complaints.  She also reports history of congenital heart murmur.        Pathology: 8/14/21    PERIPHERAL SMEAR REVIEW, BONE MARROW ASPIRATION, BONE MARROW BIOPSY AND FLOW   CYTOMETRIC EVALUATION:      1.  HISTORY OF MGUS.      2.  16% PLASMA CELLS IDENTIFIED, POLYCLONAL BY BOTH IMMUNOHISTOCHEMICAL   STUDIES AND FLOW       CYTOMETRIC EVALUATION.      3.  ANEMIA, NORMOCHROMIC AND NORMOCYTIC.      4.  IRON 0 OF 6+.      5.  NO EVIDENCE IS SEEN OR ANY MORPHOLOGIC ABNORMALITY NOTED IN THIS   MARROW EXAMINATION.      6.  SEE BELOW.   Comment:  The patient present with a history of monoclonal gammopathy.   There is an increase in plasma cells to approximately 16% by a 1,000 cell   count on blocks 1A, the biopsy, and 1B, the clot section.  However, kappa   and lambda light chain staining on these reveal polyclonal plasma cells as   does the flow cytometric evaluation (see below).  Thus there is a   plasmacytosis which most likely is reactive in nature.  The MGUS may be   exactly that in that no definitive plasma cell dyscrasia can be identified   in any study.  There is an anemia which is normochromic and normocytic in   nature.  However iron is absent on iron stains.  Therefore iron deficiency   may have some contribution to the anemia    CYTOGENETICS REPORT:   KARYOTYPE:    -46,XX [20]   -NORMAL FEMALE KARYOTYPE   INTERPRETATION: Cytogenetic analysis of the bone marrow aspirate reveals an   NORMAL female karyotype.             Labs:  Lab Results   Component Value Date    WBC 7.3 12/09/2021    HGB 12.7 12/09/2021    HCT 41.2 12/09/2021    MCV 90.7 12/09/2021    LABPLAT 232 12/09/2021      Platelets   Date Value Ref Range Status   12/09/2021 232 142 - 424 10*3/uL Final     CMP  Sodium   Date Value Ref Range Status   12/09/2021 143 135 - 145 mmol/L Final   04/24/2020 142 136 - 145 mmol/L Final     Potassium   Date Value Ref Range Status   12/09/2021 3.8 3.6 - 5.2 mmol/L Final   04/24/2020 4.0 3.5 - 5.1 mmol/L Final     Chloride   Date Value Ref Range Status   12/09/2021 106 100 - 108 mmol/L Final   04/24/2020 105 98 - 107 mmol/L Final     CO2   Date Value Ref Range Status   12/09/2021 30 21 - 32 mmol/L Final   04/24/2020 24 22 - 29 mmol/L Final     Glucose   Date Value Ref Range Status   12/09/2021 134 (H) 70 - 110 mg/dL Final     BUN   Date Value Ref Range Status   12/09/2021 18 7 - 18 mg/dL Final   04/24/2020 13.6 6 - 20 mg/dL Final     Creatinine   Date Value Ref Range Status   12/09/2021 0.79 0.55 - 1.02 mg/dL Final   04/24/2020 0.64 0.50 - 0.90 mg/dL Final     Calcium   Date Value Ref Range Status   12/09/2021 8.6 (L) 8.8 - 10.5 mg/dL Final   04/24/2020 9.3 8.6 - 10.2 mg/dL Final     Total Protein   Date Value Ref Range Status   12/09/2021 7.7 6.4 - 8.2 g/dL Final   12/09/2021 SEE NOTE <=150 mg/d Final     Comment:     Total Protein = 19.02 mg/L based on random urine. Referenceintervals not applicable for random urines.INTERPRETIVE INFORMATION: Total ProteinTotal urinary protein is determined turbidimetrically by addingthe albumin and kappa and/or lambda light chains.   This value maynot agree with the total protein as determined by chemicalmethods, which characteristically underestimate urinary lightchains.       Albumin   Date Value Ref Range Status   12/09/2021 3.5 3.4 - 5.0  g/dL Final   2020 4.1 3.5 - 5.2 g/dL Final     Total Bilirubin   Date Value Ref Range Status   2021 0.2 0.0 - 1.0 mg/dL Final     Alkaline Phosphatase   Date Value Ref Range Status   2021 134 (H) 46 - 116 U/L Final     AST   Date Value Ref Range Status   2021 20 15 - 37 U/L Final   2020 16 0 - 32 U/L Final     ALT   Date Value Ref Range Status   2021 42 12 - 78 U/L Final     Anion Gap   Date Value Ref Range Status   2021 7.0 3.0 - 11.0 mmol/L Final   2020 13.0 8.0 - 17.0 mmol/L Final     Comment:     NOTE  Testing performed at:  [facility], [address] [place] [address] CLIA #:[id number]       eGFR if    Date Value Ref Range Status   2021 117 > OR = 60 mL/min/1.73m2 Final     eGFR if non    Date Value Ref Range Status   2021 101 > OR = 60 mL/min/1.73m2 Final              Past Medical History:   Diagnosis Date    Abdominal pain     Arthritis     Constipation     COPD (chronic obstructive pulmonary disease)     Generalized headaches     GERD (gastroesophageal reflux disease)     Heart murmur     Hypertension     Sleep disorder     Smoldering myeloma 2022       History reviewed. No pertinent family history.    Social History     Socioeconomic History    Marital status: Single   Occupational History    Occupation: none   Tobacco Use    Smoking status: Never Smoker    Smokeless tobacco: Never Used   Substance and Sexual Activity    Alcohol use: No    Drug use: No    Sexual activity: Never         Past Surgical History:   Procedure Laterality Date    APPENDECTOMY  1989     SECTION      CHOLECYSTECTOMY      COLECTOMY  2012    CYST REMOVAL  2018    right shoulder    CYST REMOVAL Left 2020    Under L arm    HYSTERECTOMY  2007    JOINT REPLACEMENT      KNEE JOINT MANIPULATION Left 2018    knee surg Left 2016    knee replacement    PORTACATH  PLACEMENT      REVISION OF TOTAL REPLACEMENT OF KNEE USING ROTATING HINGE PROSTHESIS Left 12/26/2017    TOTAL KNEE REPLACEMENT USING COMPUTER NAVIGATION Right     TUBAL LIGATION  2007               Review of systems:  Review of Systems   Constitutional: Negative for activity change, appetite change, chills, diaphoresis, fatigue and unexpected weight change.   HENT: Negative for congestion, facial swelling, hearing loss, mouth sores, trouble swallowing and voice change.    Eyes: Negative for photophobia, pain, discharge and itching.   Respiratory: Negative for apnea, cough, choking, chest tightness and shortness of breath.    Cardiovascular: Negative for chest pain, palpitations and leg swelling.   Gastrointestinal: Negative for abdominal distention, abdominal pain, anal bleeding and blood in stool.   Endocrine: Negative for cold intolerance, heat intolerance, polydipsia and polyphagia.   Genitourinary: Negative for difficulty urinating, dysuria, flank pain and hematuria.   Musculoskeletal: Positive for back pain. Negative for arthralgias, joint swelling, myalgias, neck pain and neck stiffness.   Skin: Negative for color change, pallor and wound.   Allergic/Immunologic: Negative for environmental allergies, food allergies and immunocompromised state.   Neurological: Negative for dizziness, seizures, facial asymmetry, speech difficulty, light-headedness, numbness and headaches.   Hematological: Negative for adenopathy. Does not bruise/bleed easily.   Psychiatric/Behavioral: Negative for agitation, behavioral problems, confusion, decreased concentration and sleep disturbance.                     Physical Exam  Vitals and nursing note reviewed.   Constitutional:       General: She is not in acute distress.     Appearance: Normal appearance. She is not ill-appearing.   HENT:      Head: Normocephalic and atraumatic.      Nose: No congestion or rhinorrhea.   Eyes:      General: No scleral icterus.     Extraocular  Movements: Extraocular movements intact.      Pupils: Pupils are equal, round, and reactive to light.   Cardiovascular:      Rate and Rhythm: Normal rate and regular rhythm.      Pulses: Normal pulses.      Heart sounds: Normal heart sounds. No murmur heard.    No gallop.   Pulmonary:      Effort: Pulmonary effort is normal. No respiratory distress.      Breath sounds: Normal breath sounds. No stridor. No wheezing or rhonchi.   Abdominal:      General: Bowel sounds are normal. There is no distension.      Palpations: There is no mass.      Tenderness: There is no abdominal tenderness. There is no guarding.   Musculoskeletal:         General: Tenderness present. No swelling, deformity or signs of injury. Normal range of motion.      Cervical back: Normal range of motion and neck supple. No rigidity. No muscular tenderness.      Right lower leg: No edema.      Left lower leg: No edema.      Comments: R lower back b/l   Skin:     General: Skin is warm.      Coloration: Skin is not jaundiced or pale.      Findings: No bruising or lesion.   Neurological:      General: No focal deficit present.      Mental Status: She is alert and oriented to person, place, and time.      Cranial Nerves: No cranial nerve deficit.      Sensory: No sensory deficit.      Motor: No weakness.      Gait: Gait normal.   Psychiatric:         Mood and Affect: Mood normal.         Behavior: Behavior normal.         Thought Content: Thought content normal.       Vitals:    06/15/22 1440   BP: 112/72   Pulse: 74   Resp: 18   Body surface area is 2.48 meters squared.    Assessment/Plan:      ECOG 1      1. Smoldering Myeloma:    == Preliminary workup done by her primary care provider showed patient to have elevated gammaglobulin portion on serum protein electrophoresis.  She was also noted to have elevated urine free kappa light chains along with urine free lambda light chains.  Would complete workup with serum and urine free light chain and  immunofixation studies along with specific immunoglobulin levels.  = Discussed with her that in the absence of any hypercalcemia, bone disease, renal failure we could be likely looking at monoclonal gammopathy of undetermined significance insert of active multiple myeloma requiring treatment.  Previously she was noted to have normocytic normochromic anemia but on repeat labs this was found to be normal.  == 7/26/21:  Noted to have elevation of IgG kappa free light chains on repeat myeloma workup done here in our clinic.  She does not have any CRAB features at this time but slight increase noted on urinary free kappa and lambda light chains.  Would recommend completion of workup with bone marrow biopsy and PET-CT scan to rule out any osteolytic lesions.  She does complain of ?abdominal pain but not convinced if it related to plasma cell dyscrasia.  Discussed with her that at this point in time there is no indication to start treatment but would recommend close follow-up after bone marrow biopsy and PET scan completion.  == 8/25/21: Bone marrow biopsy reveals 16 % plasma cells with normal cytogenetics. PET scan showed no osteolytic lesions. No relevant anemia nor renal insufficiency and no hypercalcemia.  No CRAB features. Involved: Uninvolved serum FLC ratio is normal. With clonal bone marrow plasma cells between 10% and 59%, fulfils criteria for Smoldering Myeloma (Asymptomatic) Low risk based on clinical parameters. Would hence continue with observation and repeat labs in 3 months.  == 12/9/21: Patient here for f/up. Reports having hematuria and had an ER visit at [facility] where she was told she had a kidney stone. She reports she continues to have hematuria and intense pain at the back b/l. This has not gotten better in the last week. I advised her to drink plenty of water and continue Flomax given by her PCP. If this does not improve next week would recommend repeat imaging with bone scan to rule out osteolytic  lesions- these were previously negative at initial staging but the fact that she is crying in pain currently, my concern/recommendation is to rule out new osteolytic lesions. Advised to continue with Tylenol. She reports being allergic to codeine and does not want to take any other pain medications  === 6/15/22:  Patient presents to our clinic today after delay of 6 months.  She reports doing well in the interim and had recent labs done with her PCP. She denies any acute symptoms today. Denies blood in the urine. Denies acute back pain. No tenderness to palpation. Will obtain serum FLC for completion and follow up of Smoldering myeloma. If any abnormalities noted then will obtain a bone marrow biopsy and repeat PET scan.       2. Preventive Medicine:    = She does not remember her last colonoscopy but reports that she should be up-to-date with that.  She also reports being on oral iron supplementation in the past.  Her last iron profile done by her PCP showed normal iron and TIBC  = Advised to continue with screening mammograms every year per guidelines and to discuss with PCP about being up to date on C-scopies. She voices understanding    Plan:  Myeloma f/up labs    RTC in 3 months for MD visit with labs: CBC, CMP, FLC prior    Pt is instructed to RTC with labs for continued monitoring of treatment as instructed.     Total time spent in counseling and discussion about further management options including relevant lab work, treatment,  prognosis, medications and intended side effects was more than 25 minutes. More than 50 % of the time was spent in counseling and coordination of care.  I spent a total of 25 minutes on the day of the visit.This includes face to face time and non-face to face time preparing to see the patient (eg, review of tests), Obtaining and/or reviewing separately obtained history, Documenting clinical information in the electronic or other health record, Independently interpreting resultsand  communicating results to the patient/family/caregiver, or Care coordination.

## 2022-06-17 LAB
KAPPA FREE LIGHT CHAINS: 27.81 MG/L (ref 3.3–19.4)
KAPPA/LAMBDA FLC RATIO: 1.1 (ref 0.26–1.65)
LAMBDA LIGHT CHAIN, FREE, SERUM: 25.26 MG/L (ref 5.71–26.3)

## 2022-06-21 ENCOUNTER — TELEPHONE (OUTPATIENT)
Dept: HEMATOLOGY/ONCOLOGY | Facility: CLINIC | Age: 52
End: 2022-06-21
Payer: MEDICARE

## 2022-06-30 ENCOUNTER — TELEPHONE (OUTPATIENT)
Dept: PRIMARY CARE CLINIC | Facility: CLINIC | Age: 52
End: 2022-06-30
Payer: MEDICARE

## 2022-06-30 NOTE — TELEPHONE ENCOUNTER
----- Message from Yudelka Soto sent at 6/30/2022  8:17 AM CDT -----  Type:  Same Day Appointment Request    Caller is requesting a same day appointment.  Caller declined first available appointment listed below.    Name of Caller: Nancy Sun  When is the first available appointment? 07/06  Symptoms: sore throat, cough, sinus issues   Best Call Back Number: [phone] (home)   Additional Information: na

## 2022-06-30 NOTE — TELEPHONE ENCOUNTER
"Returned call to patient, and she states she was trying to be seen today "BUT NOBODY CALLED ME BACK!!" she c/o runny nose, congestion, sore throat, body aches. Advised we do not have any openings today and offered to COVID swab her tomorrow in parking lot and patient states "OH NO! I do not have COVID!" Patient also denies testing for it and states that she cannot come in for an appt tomorrow because it is her birthday. Patient was advised to go to ER or Urgent Care for eval.   "

## 2022-07-11 ENCOUNTER — TELEPHONE (OUTPATIENT)
Dept: PRIMARY CARE CLINIC | Facility: CLINIC | Age: 52
End: 2022-07-11
Payer: MEDICARE

## 2022-07-11 DIAGNOSIS — I26.99 ACUTE PULMONARY EMBOLISM WITHOUT ACUTE COR PULMONALE, UNSPECIFIED PULMONARY EMBOLISM TYPE: ICD-10-CM

## 2022-07-11 RX ORDER — RIVAROXABAN 20 MG/1
TABLET, FILM COATED ORAL
Qty: 30 TABLET | Refills: 0 | Status: SHIPPED | OUTPATIENT
Start: 2022-07-11 | End: 2022-07-12

## 2022-07-11 NOTE — TELEPHONE ENCOUNTER
----- Message from Halima Clements sent at 7/11/2022  1:40 PM CDT -----  Contact: self  Requesting a call back regarding side effect to the XARELTO 20 mg - having hair loss. Please call back at [phone]

## 2022-07-11 NOTE — TELEPHONE ENCOUNTER
Returned call to patient and she states she is having hair loss that started 3 weeks prior, contributes to starting Xarelto and states she does not want to take this if she is going to loss all her hair. Please advise.

## 2022-07-12 ENCOUNTER — OFFICE VISIT (OUTPATIENT)
Dept: PRIMARY CARE CLINIC | Facility: CLINIC | Age: 52
End: 2022-07-12
Payer: MEDICARE

## 2022-07-12 VITALS
HEART RATE: 98 BPM | SYSTOLIC BLOOD PRESSURE: 121 MMHG | WEIGHT: 279 LBS | TEMPERATURE: 98 F | BODY MASS INDEX: 42.28 KG/M2 | RESPIRATION RATE: 16 BRPM | HEIGHT: 68 IN | DIASTOLIC BLOOD PRESSURE: 82 MMHG

## 2022-07-12 DIAGNOSIS — F51.01 PRIMARY INSOMNIA: ICD-10-CM

## 2022-07-12 DIAGNOSIS — E66.01 MORBID OBESITY: ICD-10-CM

## 2022-07-12 DIAGNOSIS — I10 ESSENTIAL HYPERTENSION: ICD-10-CM

## 2022-07-12 DIAGNOSIS — I26.99 ACUTE PULMONARY EMBOLISM WITHOUT ACUTE COR PULMONALE, UNSPECIFIED PULMONARY EMBOLISM TYPE: Primary | ICD-10-CM

## 2022-07-12 DIAGNOSIS — K21.9 GASTROESOPHAGEAL REFLUX DISEASE, UNSPECIFIED WHETHER ESOPHAGITIS PRESENT: ICD-10-CM

## 2022-07-12 PROBLEM — K21.00 GERD WITH ESOPHAGITIS: Status: ACTIVE | Noted: 2022-07-12

## 2022-07-12 PROCEDURE — 99214 PR OFFICE/OUTPT VISIT, EST, LEVL IV, 30-39 MIN: ICD-10-PCS | Mod: S$GLB,,, | Performed by: INTERNAL MEDICINE

## 2022-07-12 PROCEDURE — 99214 OFFICE O/P EST MOD 30 MIN: CPT | Mod: S$GLB,,, | Performed by: INTERNAL MEDICINE

## 2022-07-12 RX ORDER — FAMOTIDINE 20 MG/1
20 TABLET, FILM COATED ORAL 2 TIMES DAILY
Qty: 30 TABLET | Refills: 0 | Status: SHIPPED | OUTPATIENT
Start: 2022-07-12 | End: 2022-11-09

## 2022-07-12 RX ORDER — CARVEDILOL 25 MG/1
25 TABLET ORAL 2 TIMES DAILY WITH MEALS
Qty: 60 TABLET | Refills: 3 | Status: SHIPPED | OUTPATIENT
Start: 2022-07-12 | End: 2023-07-12

## 2022-07-12 RX ORDER — ACETAMINOPHEN AND CODEINE PHOSPHATE 300; 30 MG/1; MG/1
TABLET ORAL
COMMUNITY
Start: 2022-05-04 | End: 2022-07-12

## 2022-07-12 RX ORDER — SPIRONOLACTONE 50 MG/1
50 TABLET, FILM COATED ORAL 2 TIMES DAILY
Qty: 60 TABLET | Refills: 11 | Status: SHIPPED | OUTPATIENT
Start: 2022-07-12 | End: 2023-07-12

## 2022-07-12 RX ORDER — HYDROCORTISONE ACETATE 25 MG/1
SUPPOSITORY RECTAL
COMMUNITY
Start: 2022-07-07 | End: 2022-07-12

## 2022-07-12 NOTE — PROGRESS NOTES
Subjective:      Patient ID: Nancy Sun is a 52 y.o. female.    Chief Complaint: Follow-up     Patient with h/o HTN, home blood pressures are not being monitored.  Patient reports compliance with medication.   Patient serum metanephrines were slightly high and urine metanephrines were normal.  Patient denies any  shortness of breath, ankle swelling.  Blood pressure seem under okay control. Patient reports on and off chest pain on the right side. Patient is being followed by Cardiology Dr. Quiroga. It was just aching, when she woke from sleep, patient reports she can few blocks without any Chest pain.    Patient In April/2022 went to [facility] secondary to abdominal pain + chest pain.  Patient went to [facility] where imaging studies showed PE patient was started on Xarelto. CTA chest with contrast showed a tiny focus of acute, nonocclusive pulmonary embolus with the anterior basilar segment of the right lower lobe. Patient was tolerating XEralto well but it is stopped as patient reports Hair loss.     Patient has history of MCGUS and is being followed by Dr. Crum.      Patient is planning to have Gastric sleeve in [facility]. Patient is undergoing tests for that, EGD is planned,. Nutritionist appointment is in August, Psychiatrist appointment is made as well. Patient is more adherent to diet and has lost 3 lbs. Patient had sleep Study and that was positive for FITZ and Surgeon office is planning to arrange CPAP machine.     Patient has h/o Insmonia nd is taking Ambien. Patient goes to bed at 10-11 Pm and wakes up at 7-8 am. Patient reports sleeping ok with Ambien.     Review of Systems   Constitutional: Positive for weight loss (3lb weight loss). Negative for chills, diaphoresis, fever and malaise/fatigue.   HENT: Negative for congestion, ear pain, sinus pain, sore throat and tinnitus.    Eyes: Negative for blurred vision and photophobia.   Respiratory:  Negative for cough, hemoptysis, shortness of breath and wheezing.    Cardiovascular: Positive for chest pain (atypical Chest pain ). Negative for palpitations, orthopnea, leg swelling and PND.   Gastrointestinal: Positive for abdominal pain. Negative for blood in stool, constipation, diarrhea, heartburn, melena, nausea and vomiting.   Genitourinary: Negative for dysuria, frequency and urgency.   Musculoskeletal: Negative for back pain, myalgias and neck pain.   Skin: Negative for rash.   Neurological: Negative for dizziness, tremors, seizures, loss of consciousness, weakness and headaches.   Endo/Heme/Allergies: Negative for polydipsia.   Psychiatric/Behavioral: Negative for depression and hallucinations. The patient has insomnia.    :     Objective:     Physical Exam  Constitutional:       General: She is not in acute distress.     Appearance: She is not diaphoretic.   Eyes:      Extraocular Movements: Extraocular movements intact.      Pupils: Pupils are equal, round, and reactive to light.   Neck:      Thyroid: No thyromegaly.   Cardiovascular:      Rate and Rhythm: Normal rate and regular rhythm.      Heart sounds: Normal heart sounds. No murmur heard.     Comments: Chest wall Tenderness.   Pulmonary:      Effort: Pulmonary effort is normal. No respiratory distress.      Breath sounds: Normal breath sounds. No wheezing.   Chest:      Chest wall: No tenderness.   Abdominal:      General: Bowel sounds are normal. There is no distension.      Palpations: Abdomen is soft.      Tenderness: There is no abdominal tenderness. There is no right CVA tenderness or left CVA tenderness.   Musculoskeletal:         General: No tenderness.   Lymphadenopathy:      Cervical: No cervical adenopathy.   Neurological:      Mental Status: She is alert and oriented to person, place, and time.      Cranial Nerves: No cranial nerve deficit.      Sensory: No sensory deficit.   Psychiatric:         Behavior: Behavior normal.          Thought Content: Thought content normal.         Judgment: Judgment normal.       Assessment:       ICD-10-CM ICD-9-CM   1. Acute pulmonary embolism without acute cor pulmonale, unspecified pulmonary embolism type  I26.99 415.19   2. Essential hypertension  I10 401.9   3. Primary insomnia  F51.01 307.42   4. Morbid obesity  E66.01 278.01   5. Gastroesophageal reflux disease, unspecified whether esophagitis present  K21.9 530.81       Plan:     Patient blood pressure seem under okay control.  Will continue medication.  CTA showed nonobstructive emboli.   Patient was prescribe Xarelto.   patient reports hair loss secondary to medication and does not want to continue the medicine   will switch to Eliquis  Advised patient about possible side effect of the medication including increased chances of bleeding  Patient has atypical chest pain that is reproducible on palpation.   no further workup of coronary artery disease at this time   patient insomnia is improved with Ambien.  Will continue medication.    Patient has morbid obesity and is being followed by bariatric surgeon   patient has been referred to multiple providers for evaluation   advised patient to keep appointments   patient GERD symptoms are improved  With Pepcid.  Will continue medication   advised patient to keep appointment with Hematology for MGUS.    Medication List with Changes/Refills   New Medications    APIXABAN (ELIQUIS) 5 MG TAB    Take 1 tablet (5 mg total) by mouth 2 (two) times daily.   Current Medications    ZOLPIDEM (AMBIEN) 5 MG TAB    Take 1 tablet (5 mg total) by mouth nightly as needed.   Changed and/or Refilled Medications    Modified Medication Previous Medication    CARVEDILOL (COREG) 25 MG TABLET carvediloL (COREG) 25 MG tablet       Take 1 tablet (25 mg total) by mouth 2 (two) times daily with meals.    Take 1 tablet (25 mg total) by mouth 2 (two) times daily with meals.    FAMOTIDINE (PEPCID) 20 MG TABLET famotidine (PEPCID) 20 MG  tablet       Take 1 tablet (20 mg total) by mouth 2 (two) times daily.    Take 20 mg by mouth 2 (two) times daily.    SPIRONOLACTONE (ALDACTONE) 50 MG TABLET spironolactone (ALDACTONE) 50 MG tablet       Take 1 tablet (50 mg total) by mouth 2 (two) times daily.    Take 1 tablet (50 mg total) by mouth 2 (two) times daily.   Discontinued Medications    ACETAMINOPHEN-CODEINE 300-30MG (TYLENOL #3) 300-30 MG TAB    TAKE ONE TABLET BY MOUTH EVERY SIX HOURS FOR DENTAL PAIN FOR THREE DAYS    HYDROCORTISONE (ANUSOL-HC) 25 MG SUPPOSITORY    Insert 1 suppository into rectum once a day    XARELTO 20 MG TAB    TAKE ONE TABLET BY MOUTH ONCE DAILy with DINNER OR EVENING MEAL

## 2022-07-18 DIAGNOSIS — F51.01 PRIMARY INSOMNIA: ICD-10-CM

## 2022-07-18 RX ORDER — ZOLPIDEM TARTRATE 5 MG/1
5 TABLET ORAL NIGHTLY PRN
Qty: 30 TABLET | Refills: 0 | Status: SHIPPED | OUTPATIENT
Start: 2022-07-18

## 2022-07-18 NOTE — TELEPHONE ENCOUNTER
----- Message from Ingrid Cason sent at 7/18/2022 10:22 AM CDT -----  Patient calling for refill on medication,   zolpidem (AMBIEN) 5 MG Tab      [id number] - [address] [address]  Phone: [phone] Fax: [phone]     Call back number [phone]

## 2022-07-23 ENCOUNTER — NURSE TRIAGE (OUTPATIENT)
Dept: ADMINISTRATIVE | Facility: CLINIC | Age: 52
End: 2022-07-23
Payer: MEDICARE

## 2022-07-23 DIAGNOSIS — K21.00 GASTROESOPHAGEAL REFLUX DISEASE WITH ESOPHAGITIS, UNSPECIFIED WHETHER HEMORRHAGE: Primary | ICD-10-CM

## 2022-07-23 NOTE — TELEPHONE ENCOUNTER
Patient states she is having chest pain and it feels like pressure. She feels that it is the medication she is taking and will not take the disposition to call EMS.   Reason for Disposition   [1] Chest pain lasts > 5 minutes AND [2] age > 44    Additional Information   Negative: SEVERE difficulty breathing (e.g., struggling for each breath, speaks in single words)   Negative: Difficult to awaken or acting confused (e.g., disoriented, slurred speech)   Negative: Shock suspected (e.g., cold/pale/clammy skin, too weak to stand, low BP, rapid pulse)   Negative: Passed out (i.e., fainted, collapsed and was not responding)    Protocols used: CHEST PAIN-A-AH

## 2022-07-25 DIAGNOSIS — K21.00 GASTROESOPHAGEAL REFLUX DISEASE WITH ESOPHAGITIS, UNSPECIFIED WHETHER HEMORRHAGE: ICD-10-CM

## 2022-07-25 RX ORDER — PANTOPRAZOLE SODIUM 40 MG/1
40 TABLET, DELAYED RELEASE ORAL DAILY
Qty: 30 TABLET | Refills: 11 | Status: SHIPPED | OUTPATIENT
Start: 2022-07-25 | End: 2022-07-25 | Stop reason: SDUPTHER

## 2022-07-25 RX ORDER — PANTOPRAZOLE SODIUM 40 MG/1
40 TABLET, DELAYED RELEASE ORAL DAILY
Qty: 30 TABLET | Refills: 11 | Status: SHIPPED | OUTPATIENT
Start: 2022-07-25 | End: 2023-07-25

## 2022-07-25 NOTE — TELEPHONE ENCOUNTER
Returned call to patient and she states that pharmacy did not receive the script for pantoprazole 40 mg and would like to know why Dr. Hanna sent such a high dose of medication, advised patient that she was previously taking famotidine 20 mg w/o much help and protonix 40 mg would be the next step. Patient advised that she called earlier and I did not call her back. I did try to call patient and lvm but I was in clinic and was not near my phone when she called back. Patient was transferred to Dr. Hanna to discuss her symptoms as she refused to go to ER.

## 2022-07-25 NOTE — TELEPHONE ENCOUNTER
I received a message reporting patient having chest pain for which she was advised to go to ER.  According to do not from triage nurse patient refused to go to ER.     Patient reported that she went to ER where she was diagnosed to have GERD and was given Reglan.  Patient reported persistent chest pain which has not relieved.  Patient insists that it is not cardiac in origin and it is GERD and was upset that why she is given famotidine which is a very light medication.  I advised patient that I will call in pantoprazole for heartburn.     Patient later called my nurse and was upset that why a high-dose medication was sent for patient.  I talked to patient again and she was insistent the chest pain is persistent without any relief.  At that time patient reported that on last ER visit that did not do any lab work and was not workup for cardiac disease.  I advised patient to go to ER and that I will talk to ER staff to make showed patient cardiac workup is complete + and her pain is under control before she is discharged.     Patient persistently refused to go to ER and advised that she should be admitted by me in hospital for further workup.  Advise the better way will be to go through ER.  Patient agreed to go to [facility].  A called ER and talked to the PA who reported that they are very familiar with patient and will make sure it take good care of the patient.

## 2022-07-25 NOTE — TELEPHONE ENCOUNTER
----- Message from Eli Singh sent at 7/25/2022  3:05 PM CDT -----  Contact: PT      Name of Caller:  Nancy        Pharmacy Name:    [facility] #[id number] [address] [address]  Phone: [phone] Fax: [phone]        Prescription Name:  pantoprazole (PROTONIX) 40 MG tablet  What do they need to clarify?: rx not there  Best Call Back Number: .[phone] (home)         Additional Information:  pt went there//  pt wants to know why the strength is 40 mg

## 2022-07-25 NOTE — TELEPHONE ENCOUNTER
Talked to Patient she is having chest pain that is pressure like, burning in nature, constant,  not associated with exertion or food, no shortness of breath.  Patient was evaluated in ER and was diagnosed to have GERD.  Patient is given Reglan.  Patient has use Reglan without much help previously.  Patient reports she is taking famotidine without much help...  Will prescribe pantoprazole.  Advised patient if develops shortness of breath or if the symptoms do not improve call my office  Advised patient to make an early appointment

## 2022-07-25 NOTE — TELEPHONE ENCOUNTER
----- Message from Eli Singh sent at 7/25/2022  3:05 PM CDT -----  Contact: PT      Name of Caller:  Nancy        Pharmacy Name:    [facility] #[id number] [address] [address]  Phone: [phone] Fax: [phone]        Prescription Name:  pantoprazole (PROTONIX) 40 MG tablet  What do they need to clarify?: rx not there  Best Call Back Number: .[phone] (home)         Additional Information:  pt went there//  pt wants to know why the strength is 40 mg

## 2022-07-25 NOTE — TELEPHONE ENCOUNTER
----- Message from Eli Singh sent at 7/25/2022  3:05 PM CDT -----  Contact: PT      Name of Caller:  Nancy        Pharmacy Name:    [facility] #[id number] [address] [address]  Phone: [phone] Fax: [phone]        Prescription Name:  pantoprazole (PROTONIX) 40 MG tablet  What do they need to clarify?: rx not there  Best Call Back Number: .[phone] (home)         Additional Information:  pt went there//  pt wants to know why the strength is 40 mg

## 2022-07-25 NOTE — TELEPHONE ENCOUNTER
----- Message from Tamika Saleh sent at 7/25/2022  8:35 AM CDT -----  Contact: pt  .Type:  Same Day Appointment Request    Caller is requesting a same day appointment.  Caller declined first available appointment listed below.    Name of Caller:darien  When is the first available appointment? 7/28   Symptoms:blood pressure   Best Call Back Number:.[phone]    Additional Information: pt spoke with dr fonseca and was told she had to come in for rx    Cyndi mrn- [phone]

## 2022-07-25 NOTE — TELEPHONE ENCOUNTER
Returned call to patient, she states she is going to [facility] ER for Chest pain and said you wanted her to let you know. Please advise. Pulling last ER not from [facility].

## 2022-07-27 ENCOUNTER — CLINICAL SUPPORT (OUTPATIENT)
Dept: PRIMARY CARE CLINIC | Facility: CLINIC | Age: 52
End: 2022-07-27
Payer: MEDICARE

## 2022-07-27 NOTE — PROGRESS NOTES
Patient came in today wanting to speak with management, she was stating that she called Monday due to having chest pain and didn't understand why she was directed to the ER. I explained that when a patient calls with chest pain we as medical professionals get more information and direct the patient to the ER for a work up. I explained that the medication was called out on Monday as documented in the chart to Emil. Also discussed the appointment that was made Monday for today Wed as documented in the computer under appt desk. Patient continued to state she would find another provider while she was playing on her phone.     Patient has been disrespectful and rude to multiple providers and staff.  It appears that she does not trust my team with her care.  And I believe that if the trust between the physician and the patient is not there relationship should not be continued.  Patient should be advised to see another provider that can meet her needs.  Patient has informed the staff that she will look for another provider.  If patient needs assistance in finding another provider will be more than happy to assist.

## 2022-09-02 DIAGNOSIS — Z12.89 ENCOUNTER FOR SCREENING FOR MALIGNANT NEOPLASM OF OTHER SITES: ICD-10-CM

## 2022-09-02 DIAGNOSIS — D47.2 SMOLDERING MYELOMA: Primary | ICD-10-CM

## 2022-09-06 LAB
ALBUMIN SERPL BCP-MCNC: 3.6 G/DL (ref 3.4–5)
ALBUMIN/GLOBULIN RATIO: 0.82 RATIO (ref 1.1–1.8)
ALP SERPL-CCNC: 126 U/L (ref 46–116)
ALT SERPL W P-5'-P-CCNC: 21 U/L (ref 12–78)
ANION GAP SERPL CALC-SCNC: 9 MMOL/L (ref 3–11)
AST SERPL-CCNC: 17 U/L (ref 15–37)
BASOPHILS NFR BLD: 0.5 % (ref 0–3)
BILIRUB SERPL-MCNC: 0.4 MG/DL (ref 0–1)
BUN SERPL-MCNC: 18 MG/DL (ref 7–18)
BUN/CREAT SERPL: 21.17 RATIO (ref 7–18)
CALCIUM SERPL-MCNC: 9 MG/DL (ref 8.8–10.5)
CHLORIDE SERPL-SCNC: 104 MMOL/L (ref 100–108)
CO2 SERPL-SCNC: 28 MMOL/L (ref 21–32)
CREAT SERPL-MCNC: 0.85 MG/DL (ref 0.55–1.02)
EOSINOPHIL NFR BLD: 2.5 % (ref 1–3)
ERYTHROCYTE [DISTWIDTH] IN BLOOD BY AUTOMATED COUNT: 13.8 % (ref 12.5–18)
GFR ESTIMATION: > 60
GLOBULIN: 4.4 G/DL (ref 2.3–3.5)
GLUCOSE SERPL-MCNC: 88 MG/DL (ref 70–110)
HCT VFR BLD AUTO: 37.4 % (ref 37–47)
HGB BLD-MCNC: 12 G/DL (ref 12–16)
LYMPHOCYTES NFR BLD: 30.7 % (ref 25–40)
MCH RBC QN AUTO: 28.8 PG (ref 27–31.2)
MCHC RBC AUTO-ENTMCNC: 32.1 G/DL (ref 31.8–35.4)
MCV RBC AUTO: 89.9 FL (ref 80–97)
MONOCYTES NFR BLD: 8.8 % (ref 1–15)
NEUTROPHILS # BLD AUTO: 4.41 10*3/UL (ref 1.8–7.7)
NEUTROPHILS NFR BLD: 57 % (ref 37–80)
NUCLEATED RED BLOOD CELLS: 0 %
PLATELETS: 219 10*3/UL (ref 142–424)
POTASSIUM SERPL-SCNC: 4.6 MMOL/L (ref 3.6–5.2)
PROT SERPL-MCNC: 8 G/DL (ref 6.4–8.2)
RBC # BLD AUTO: 4.16 10*6/UL (ref 4.2–5.4)
SODIUM BLD-SCNC: 141 MMOL/L (ref 135–145)
WBC # BLD: 7.7 10*3/UL (ref 4.6–10.2)

## 2022-09-09 LAB
KAPPA FREE LIGHT CHAINS: 30.49 MG/L (ref 3.3–19.4)
KAPPA/LAMBDA FLC RATIO: 1.25 (ref 0.26–1.65)
LAMBDA LIGHT CHAIN, FREE, SERUM: 24.3 MG/L (ref 5.71–26.3)

## 2022-09-13 ENCOUNTER — OFFICE VISIT (OUTPATIENT)
Dept: HEMATOLOGY/ONCOLOGY | Facility: CLINIC | Age: 52
End: 2022-09-13
Payer: MEDICARE

## 2022-09-13 VITALS
BODY MASS INDEX: 42.13 KG/M2 | WEIGHT: 278 LBS | HEIGHT: 68 IN | SYSTOLIC BLOOD PRESSURE: 116 MMHG | TEMPERATURE: 98 F | HEART RATE: 97 BPM | DIASTOLIC BLOOD PRESSURE: 77 MMHG | RESPIRATION RATE: 16 BRPM | OXYGEN SATURATION: 98 %

## 2022-09-13 DIAGNOSIS — D47.2 SMOLDERING MYELOMA: Primary | ICD-10-CM

## 2022-09-13 PROCEDURE — 99214 PR OFFICE/OUTPT VISIT, EST, LEVL IV, 30-39 MIN: ICD-10-PCS | Mod: S$GLB,,, | Performed by: NURSE PRACTITIONER

## 2022-09-13 PROCEDURE — 99214 OFFICE O/P EST MOD 30 MIN: CPT | Mod: S$GLB,,, | Performed by: NURSE PRACTITIONER

## 2022-09-13 RX ORDER — CYCLOBENZAPRINE HCL 10 MG
TABLET ORAL
COMMUNITY
Start: 2022-09-03 | End: 2023-06-09 | Stop reason: SDUPTHER

## 2022-09-13 RX ORDER — METFORMIN HYDROCHLORIDE 500 MG/1
500 TABLET, EXTENDED RELEASE ORAL DAILY
COMMUNITY
Start: 2022-09-08 | End: 2022-11-09

## 2022-09-13 RX ORDER — RIVAROXABAN 15 MG-20MG
KIT ORAL
COMMUNITY
Start: 2022-04-18 | End: 2022-11-09

## 2022-09-13 NOTE — PROGRESS NOTES
HEMATOLOGY FOLLOW UP CONSULTATION NOTE    Patient ID: Nancy Sun is a 52 y.o. female.    Chief Complaint: Monoclonal Gammopathy, suspicion for Myeloma    HPI:  Patient is a 51-year-old female with past medical history of hypertension, morbid obesity, insomnia, hypergammaglobulinemia, gastroesophageal reflux disease without esophagitis, chronic idiopathic constipation who was referred by her primary care provider after she was noted to have elevated proteins and further workup including urinalysis showed  increased urinary kappa and lambda chains.  She presents to our clinic today for further evaluation along with her mother.  Voices no acute complaints.  She also reports history of congenital heart murmur.        Pathology: 8/14/21    PERIPHERAL SMEAR REVIEW, BONE MARROW ASPIRATION, BONE MARROW BIOPSY AND FLOW   CYTOMETRIC EVALUATION:      1.  HISTORY OF MGUS.      2.  16% PLASMA CELLS IDENTIFIED, POLYCLONAL BY BOTH IMMUNOHISTOCHEMICAL   STUDIES AND FLOW       CYTOMETRIC EVALUATION.      3.  ANEMIA, NORMOCHROMIC AND NORMOCYTIC.      4.  IRON 0 OF 6+.      5.  NO EVIDENCE IS SEEN OR ANY MORPHOLOGIC ABNORMALITY NOTED IN THIS   MARROW EXAMINATION.      6.  SEE BELOW.   Comment:  The patient present with a history of monoclonal gammopathy.   There is an increase in plasma cells to approximately 16% by a 1,000 cell   count on blocks 1A, the biopsy, and 1B, the clot section.  However, kappa   and lambda light chain staining on these reveal polyclonal plasma cells as   does the flow cytometric evaluation (see below).  Thus there is a   plasmacytosis which most likely is reactive in nature.  The MGUS may be   exactly that in that no definitive plasma cell dyscrasia can be identified   in any study.  There is an anemia which is normochromic and normocytic in   nature.  However iron is absent on iron stains.  Therefore iron deficiency   may have some contribution to the anemia    CYTOGENETICS REPORT:   KARYOTYPE:    -46,XX [20]   -NORMAL FEMALE KARYOTYPE   INTERPRETATION: Cytogenetic analysis of the bone marrow aspirate reveals an   NORMAL female karyotype.             Labs:  Lab Results   Component Value Date    WBC 7.7 09/06/2022    HGB 12.0 09/06/2022    HCT 37.4 09/06/2022    MCV 89.9 09/06/2022    LABPLAT 219 09/06/2022      Platelets   Date Value Ref Range Status   09/06/2022 219 142 - 424 10*3/uL Final     CMP  Sodium   Date Value Ref Range Status   09/06/2022 141 135 - 145 mmol/L Final   04/24/2020 142 136 - 145 mmol/L Final     Potassium   Date Value Ref Range Status   09/06/2022 4.6 3.6 - 5.2 mmol/L Final   04/24/2020 4.0 3.5 - 5.1 mmol/L Final     Chloride   Date Value Ref Range Status   09/06/2022 104 100 - 108 mmol/L Final   04/24/2020 105 98 - 107 mmol/L Final     CO2   Date Value Ref Range Status   09/06/2022 28 21 - 32 mmol/L Final   04/24/2020 24 22 - 29 mmol/L Final     Glucose   Date Value Ref Range Status   09/06/2022 88 70 - 110 mg/dL Final     BUN   Date Value Ref Range Status   09/06/2022 18 7 - 18 mg/dL Final   04/24/2020 13.6 6 - 20 mg/dL Final     Creatinine   Date Value Ref Range Status   09/06/2022 0.85 0.55 - 1.02 mg/dL Final   04/24/2020 0.64 0.50 - 0.90 mg/dL Final     Calcium   Date Value Ref Range Status   09/06/2022 9.0 8.8 - 10.5 mg/dL Final   04/24/2020 9.3 8.6 - 10.2 mg/dL Final     Total Protein   Date Value Ref Range Status   09/06/2022 8.0 6.4 - 8.2 g/dL Final     Albumin   Date Value Ref Range Status   09/06/2022 3.6 3.4 - 5.0 g/dL Final   04/24/2020 4.1 3.5 - 5.2 g/dL Final     Total Bilirubin   Date Value Ref Range Status   09/06/2022 0.4 0.0 - 1.0 mg/dL Final     Alkaline Phosphatase   Date Value Ref Range Status   09/06/2022 126 (H) 46 - 116 U/L Final     AST   Date Value Ref Range Status   09/06/2022 17 15 - 37 U/L Final   04/24/2020 16 0 - 32 U/L Final     ALT   Date Value Ref Range Status   09/06/2022 21 12 - 78 U/L Final     Anion Gap   Date Value Ref Range Status   09/06/2022  9.0 3.0 - 11.0 mmol/L Final   2020 13.0 8.0 - 17.0 mmol/L Final     Comment:     NOTE  Testing performed at:  [facility], [address] [place] [address]  [address] CLIA #:[id number]       eGFR if    Date Value Ref Range Status   2021 117 > OR = 60 mL/min/1.73m2 Final     eGFR if non    Date Value Ref Range Status   2021 101 > OR = 60 mL/min/1.73m2 Final              Past Medical History:   Diagnosis Date    Abdominal pain     Arthritis     Constipation     COPD (chronic obstructive pulmonary disease)     Generalized headaches     GERD (gastroesophageal reflux disease)     Heart murmur     Hypertension     Sleep disorder     Smoldering myeloma 2022       No family history on file.    Social History     Socioeconomic History    Marital status: Single   Occupational History    Occupation: none   Tobacco Use    Smoking status: Never    Smokeless tobacco: Never   Substance and Sexual Activity    Alcohol use: No    Drug use: No    Sexual activity: Never         Past Surgical History:   Procedure Laterality Date    APPENDECTOMY  1989     SECTION      CHOLECYSTECTOMY      COLECTOMY  2012    CYST REMOVAL  2018    right shoulder    CYST REMOVAL Left 2020    Under L arm    HYSTERECTOMY  2007    JOINT REPLACEMENT      KNEE JOINT MANIPULATION Left 2018    knee surg Left 2016    knee replacement    PORTACATH PLACEMENT      REVISION OF TOTAL REPLACEMENT OF KNEE USING ROTATING HINGE PROSTHESIS Left 2017    TOTAL KNEE REPLACEMENT USING COMPUTER NAVIGATION Right     TUBAL LIGATION  2007               Review of systems:  Review of Systems   Constitutional: Negative for activity change, appetite change, chills, diaphoresis, fatigue and unexpected weight change.   HENT: Negative for congestion, facial swelling, hearing loss, mouth sores, trouble swallowing and voice change.    Eyes: Negative for photophobia, pain, discharge and  itching.   Respiratory: Negative for apnea, cough, choking, chest tightness and shortness of breath.    Cardiovascular: Negative for chest pain, palpitations and leg swelling.   Gastrointestinal: Negative for abdominal distention, abdominal pain, anal bleeding and blood in stool.   Endocrine: Negative for cold intolerance, heat intolerance, polydipsia and polyphagia.   Genitourinary: Negative for difficulty urinating, dysuria, flank pain and hematuria.   Musculoskeletal: Positive for back pain. Negative for arthralgias, joint swelling, myalgias, neck pain and neck stiffness.   Skin: Negative for color change, pallor and wound.   Allergic/Immunologic: Negative for environmental allergies, food allergies and immunocompromised state.   Neurological: Negative for dizziness, seizures, facial asymmetry, speech difficulty, light-headedness, numbness and headaches.   Hematological: Negative for adenopathy. Does not bruise/bleed easily.   Psychiatric/Behavioral: Negative for agitation, behavioral problems, confusion, decreased concentration and sleep disturbance.                     Physical Exam  Vitals and nursing note reviewed.   Constitutional:       General: She is not in acute distress.     Appearance: Normal appearance. She is not ill-appearing.   HENT:      Head: Normocephalic and atraumatic.      Nose: No congestion or rhinorrhea.   Eyes:      General: No scleral icterus.     Extraocular Movements: Extraocular movements intact.      Pupils: Pupils are equal, round, and reactive to light.   Cardiovascular:      Rate and Rhythm: Normal rate and regular rhythm.      Pulses: Normal pulses.      Heart sounds: Normal heart sounds. No murmur heard.    No gallop.   Pulmonary:      Effort: Pulmonary effort is normal. No respiratory distress.      Breath sounds: Normal breath sounds. No stridor. No wheezing or rhonchi.   Abdominal:      General: Bowel sounds are normal. There is no distension.      Palpations: There is no  mass.      Tenderness: There is no abdominal tenderness. There is no guarding.   Musculoskeletal:         General: Tenderness present. No swelling, deformity or signs of injury. Normal range of motion.      Cervical back: Normal range of motion and neck supple. No rigidity. No muscular tenderness.      Right lower leg: No edema.      Left lower leg: No edema.      Comments: R lower back b/l   Skin:     General: Skin is warm.      Coloration: Skin is not jaundiced or pale.      Findings: No bruising or lesion.   Neurological:      General: No focal deficit present.      Mental Status: She is alert and oriented to person, place, and time.      Cranial Nerves: No cranial nerve deficit.      Sensory: No sensory deficit.      Motor: No weakness.      Gait: Gait normal.   Psychiatric:         Mood and Affect: Mood normal.         Behavior: Behavior normal.         Thought Content: Thought content normal.       Vitals:    09/13/22 1300   BP: 116/77   Pulse: 97   Resp: 16   Temp: 97.6 °F (36.4 °C)   Body surface area is 2.46 meters squared.    Assessment/Plan:      ECOG 1      1. Smoldering Myeloma:    == Preliminary workup done by her primary care provider showed patient to have elevated gammaglobulin portion on serum protein electrophoresis.  She was also noted to have elevated urine free kappa light chains along with urine free lambda light chains.  Would complete workup with serum and urine free light chain and immunofixation studies along with specific immunoglobulin levels.  = Discussed with her that in the absence of any hypercalcemia, bone disease, renal failure we could be likely looking at monoclonal gammopathy of undetermined significance insert of active multiple myeloma requiring treatment.  Previously she was noted to have normocytic normochromic anemia but on repeat labs this was found to be normal.  == 7/26/21:  Noted to have elevation of IgG kappa free light chains on repeat myeloma workup done here in our  clinic.  She does not have any CRAB features at this time but slight increase noted on urinary free kappa and lambda light chains.  Would recommend completion of workup with bone marrow biopsy and PET-CT scan to rule out any osteolytic lesions.  She does complain of ?abdominal pain but not convinced if it related to plasma cell dyscrasia.  Discussed with her that at this point in time there is no indication to start treatment but would recommend close follow-up after bone marrow biopsy and PET scan completion.  == 8/25/21: Bone marrow biopsy reveals 16 % plasma cells with normal cytogenetics. PET scan showed no osteolytic lesions. No relevant anemia nor renal insufficiency and no hypercalcemia.  No CRAB features. Involved: Uninvolved serum FLC ratio is normal. With clonal bone marrow plasma cells between 10% and 59%, fulfils criteria for Smoldering Myeloma (Asymptomatic) Low risk based on clinical parameters. Would hence continue with observation and repeat labs in 3 months.  == 12/9/21: Patient here for f/up. Reports having hematuria and had an ER visit at [facility] where she was told she had a kidney stone. She reports she continues to have hematuria and intense pain at the back b/l. This has not gotten better in the last week. I advised her to drink plenty of water and continue Flomax given by her PCP. If this does not improve next week would recommend repeat imaging with bone scan to rule out osteolytic lesions- these were previously negative at initial staging but the fact that she is crying in pain currently, my concern/recommendation is to rule out new osteolytic lesions. Advised to continue with Tylenol. She reports being allergic to codeine and does not want to take any other pain medications  == 6/15/22:  Patient presents to our clinic today after delay of 6 months.  She reports doing well in the interim and had recent labs done with her PCP. She denies any acute symptoms today. Denies blood in the urine.  Denies acute back pain. No tenderness to palpation. Will obtain serum FLC for completion and follow up of Smoldering myeloma. If any abnormalities noted then will obtain a bone marrow biopsy and repeat PET scan.   == 9/13/22: labs reviewed negative for CRAB criteria, no FLC available for review this visit, she does report low back pain but chronic in nature and followed by PCP. Actively trying to loose weight with prescription dietary supplement, unsure of the name and is considering the  possibly of gastric sleeve surgery also reports on xarelto for upper ext DVT found in April, plan to stop in October, followed by PCP.     She has decided that she no longer wants to follow up in clinic, stating this is waste of her time, discussed that 30% of MGUS can convert and become active multiple myeloma, which will require active treatment. I strongly recommend that she continues follow up with routine lab evaluation every 3 months and she declines, I even suggested she come in at least every 6 months but again she declined.     2. Preventive Medicine:    = She does not remember her last colonoscopy but reports that she should be up-to-date with that.  She also reports being on oral iron supplementation in the past.  Her last iron profile done by her PCP showed normal iron and TIBC  = Advised to continue with screening mammograms every year per guidelines and to discuss with PCP about being up to date on C-scopies. She voices understanding    Plan:  Myeloma f/up labs    RTC in 3 months for MD visit with labs: CBC, CMP, FLC prior    Pt is instructed to RTC with labs for continued monitoring of treatment as instructed.     Total time spent in counseling and discussion about further management options including relevant lab work, treatment,  prognosis, medications and intended side effects was more than 25 minutes. More than 50 % of the time was spent in counseling and coordination of care.  I spent a total of 25 minutes on  the day of the visit.This includes face to face time and non-face to face time preparing to see the patient (eg, review of tests), Obtaining and/or reviewing separately obtained history, Documenting clinical information in the electronic or other health record, Independently interpreting resultsand communicating results to the patient/family/caregiver, or Care coordination.

## 2022-10-04 DIAGNOSIS — I26.99 ACUTE PULMONARY EMBOLISM WITHOUT ACUTE COR PULMONALE, UNSPECIFIED PULMONARY EMBOLISM TYPE: ICD-10-CM

## 2022-10-04 RX ORDER — APIXABAN 5 MG/1
TABLET, FILM COATED ORAL
Qty: 60 TABLET | Refills: 0 | OUTPATIENT
Start: 2022-10-04

## 2022-10-07 ENCOUNTER — OFFICE VISIT (OUTPATIENT)
Dept: HEMATOLOGY/ONCOLOGY | Facility: CLINIC | Age: 52
End: 2022-10-07
Payer: MEDICARE

## 2022-10-07 VITALS
OXYGEN SATURATION: 98 % | DIASTOLIC BLOOD PRESSURE: 71 MMHG | HEIGHT: 68 IN | HEART RATE: 78 BPM | BODY MASS INDEX: 43.35 KG/M2 | TEMPERATURE: 98 F | SYSTOLIC BLOOD PRESSURE: 111 MMHG | WEIGHT: 286 LBS

## 2022-10-07 DIAGNOSIS — M79.89 LEFT ARM SWELLING: Primary | ICD-10-CM

## 2022-10-07 PROCEDURE — 99214 PR OFFICE/OUTPT VISIT, EST, LEVL IV, 30-39 MIN: ICD-10-PCS | Mod: S$GLB,,, | Performed by: NURSE PRACTITIONER

## 2022-10-07 PROCEDURE — 1159F MED LIST DOCD IN RCRD: CPT | Mod: CPTII,S$GLB,, | Performed by: NURSE PRACTITIONER

## 2022-10-07 PROCEDURE — 99214 OFFICE O/P EST MOD 30 MIN: CPT | Mod: S$GLB,,, | Performed by: NURSE PRACTITIONER

## 2022-10-07 PROCEDURE — 3074F SYST BP LT 130 MM HG: CPT | Mod: CPTII,S$GLB,, | Performed by: NURSE PRACTITIONER

## 2022-10-07 PROCEDURE — 3078F PR MOST RECENT DIASTOLIC BLOOD PRESSURE < 80 MM HG: ICD-10-PCS | Mod: CPTII,S$GLB,, | Performed by: NURSE PRACTITIONER

## 2022-10-07 PROCEDURE — 3074F PR MOST RECENT SYSTOLIC BLOOD PRESSURE < 130 MM HG: ICD-10-PCS | Mod: CPTII,S$GLB,, | Performed by: NURSE PRACTITIONER

## 2022-10-07 PROCEDURE — 1160F RVW MEDS BY RX/DR IN RCRD: CPT | Mod: CPTII,S$GLB,, | Performed by: NURSE PRACTITIONER

## 2022-10-07 PROCEDURE — 1160F PR REVIEW ALL MEDS BY PRESCRIBER/CLIN PHARMACIST DOCUMENTED: ICD-10-PCS | Mod: CPTII,S$GLB,, | Performed by: NURSE PRACTITIONER

## 2022-10-07 PROCEDURE — 3008F BODY MASS INDEX DOCD: CPT | Mod: CPTII,S$GLB,, | Performed by: NURSE PRACTITIONER

## 2022-10-07 PROCEDURE — 1159F PR MEDICATION LIST DOCUMENTED IN MEDICAL RECORD: ICD-10-PCS | Mod: CPTII,S$GLB,, | Performed by: NURSE PRACTITIONER

## 2022-10-07 PROCEDURE — 3008F PR BODY MASS INDEX (BMI) DOCUMENTED: ICD-10-PCS | Mod: CPTII,S$GLB,, | Performed by: NURSE PRACTITIONER

## 2022-10-07 PROCEDURE — 3078F DIAST BP <80 MM HG: CPT | Mod: CPTII,S$GLB,, | Performed by: NURSE PRACTITIONER

## 2022-10-07 NOTE — PROGRESS NOTES
HEMATOLOGY FOLLOW UP CONSULTATION NOTE    Patient ID: Nancy Sun is a 52 y.o. female.    Chief Complaint: Monoclonal Gammopathy, suspicion for Myeloma    HPI:  Patient is a 51-year-old female with past medical history of hypertension, morbid obesity, insomnia, hypergammaglobulinemia, gastroesophageal reflux disease without esophagitis, chronic idiopathic constipation who was referred by her primary care provider after she was noted to have elevated proteins and further workup including urinalysis showed  increased urinary kappa and lambda chains.  She presents to our clinic today for further evaluation along with her mother.  Voices no acute complaints.  She also reports history of congenital heart murmur.        Pathology: 8/14/21    PERIPHERAL SMEAR REVIEW, BONE MARROW ASPIRATION, BONE MARROW BIOPSY AND FLOW   CYTOMETRIC EVALUATION:      1.  HISTORY OF MGUS.      2.  16% PLASMA CELLS IDENTIFIED, POLYCLONAL BY BOTH IMMUNOHISTOCHEMICAL   STUDIES AND FLOW       CYTOMETRIC EVALUATION.      3.  ANEMIA, NORMOCHROMIC AND NORMOCYTIC.      4.  IRON 0 OF 6+.      5.  NO EVIDENCE IS SEEN OR ANY MORPHOLOGIC ABNORMALITY NOTED IN THIS   MARROW EXAMINATION.      6.  SEE BELOW.   Comment:  The patient present with a history of monoclonal gammopathy.   There is an increase in plasma cells to approximately 16% by a 1,000 cell   count on blocks 1A, the biopsy, and 1B, the clot section.  However, kappa   and lambda light chain staining on these reveal polyclonal plasma cells as   does the flow cytometric evaluation (see below).  Thus there is a   plasmacytosis which most likely is reactive in nature.  The MGUS may be   exactly that in that no definitive plasma cell dyscrasia can be identified   in any study.  There is an anemia which is normochromic and normocytic in   nature.  However iron is absent on iron stains.  Therefore iron deficiency   may have some contribution to the anemia    CYTOGENETICS REPORT:   KARYOTYPE:    -46,XX [20]   -NORMAL FEMALE KARYOTYPE   INTERPRETATION: Cytogenetic analysis of the bone marrow aspirate reveals an   NORMAL female karyotype.             Labs:  Lab Results   Component Value Date    WBC 7.7 09/06/2022    HGB 12.0 09/06/2022    HCT 37.4 09/06/2022    MCV 89.9 09/06/2022    LABPLAT 219 09/06/2022      Platelets   Date Value Ref Range Status   09/06/2022 219 142 - 424 10*3/uL Final     CMP  Sodium   Date Value Ref Range Status   09/06/2022 141 135 - 145 mmol/L Final   04/24/2020 142 136 - 145 mmol/L Final     Potassium   Date Value Ref Range Status   09/06/2022 4.6 3.6 - 5.2 mmol/L Final   04/24/2020 4.0 3.5 - 5.1 mmol/L Final     Chloride   Date Value Ref Range Status   09/06/2022 104 100 - 108 mmol/L Final   04/24/2020 105 98 - 107 mmol/L Final     CO2   Date Value Ref Range Status   09/06/2022 28 21 - 32 mmol/L Final   04/24/2020 24 22 - 29 mmol/L Final     Glucose   Date Value Ref Range Status   09/06/2022 88 70 - 110 mg/dL Final     BUN   Date Value Ref Range Status   09/06/2022 18 7 - 18 mg/dL Final   04/24/2020 13.6 6 - 20 mg/dL Final     Creatinine   Date Value Ref Range Status   09/06/2022 0.85 0.55 - 1.02 mg/dL Final   04/24/2020 0.64 0.50 - 0.90 mg/dL Final     Calcium   Date Value Ref Range Status   09/06/2022 9.0 8.8 - 10.5 mg/dL Final   04/24/2020 9.3 8.6 - 10.2 mg/dL Final     Total Protein   Date Value Ref Range Status   09/06/2022 8.0 6.4 - 8.2 g/dL Final     Albumin   Date Value Ref Range Status   09/06/2022 3.6 3.4 - 5.0 g/dL Final   04/24/2020 4.1 3.5 - 5.2 g/dL Final     Total Bilirubin   Date Value Ref Range Status   09/06/2022 0.4 0.0 - 1.0 mg/dL Final     Alkaline Phosphatase   Date Value Ref Range Status   09/06/2022 126 (H) 46 - 116 U/L Final     AST   Date Value Ref Range Status   09/06/2022 17 15 - 37 U/L Final   04/24/2020 16 0 - 32 U/L Final     ALT   Date Value Ref Range Status   09/06/2022 21 12 - 78 U/L Final     Anion Gap   Date Value Ref Range Status   09/06/2022  9.0 3.0 - 11.0 mmol/L Final   2020 13.0 8.0 - 17.0 mmol/L Final     Comment:     NOTE  Testing performed at:  [facility], [address] [place] [address]  [address] CLIA #:[id number]       eGFR if    Date Value Ref Range Status   2021 117 > OR = 60 mL/min/1.73m2 Final     eGFR if non    Date Value Ref Range Status   2021 101 > OR = 60 mL/min/1.73m2 Final              Past Medical History:   Diagnosis Date    Abdominal pain     Arthritis     Constipation     COPD (chronic obstructive pulmonary disease)     Generalized headaches     GERD (gastroesophageal reflux disease)     Heart murmur     Hypertension     Sleep disorder     Smoldering myeloma 2022       History reviewed. No pertinent family history.    Social History     Socioeconomic History    Marital status: Single   Occupational History    Occupation: none   Tobacco Use    Smoking status: Never    Smokeless tobacco: Never   Substance and Sexual Activity    Alcohol use: No    Drug use: No    Sexual activity: Never         Past Surgical History:   Procedure Laterality Date    APPENDECTOMY  1989     SECTION      CHOLECYSTECTOMY      COLECTOMY  2012    CYST REMOVAL  2018    right shoulder    CYST REMOVAL Left 2020    Under L arm    HYSTERECTOMY  2007    JOINT REPLACEMENT      KNEE JOINT MANIPULATION Left 2018    knee surg Left 2016    knee replacement    PORTACATH PLACEMENT      REVISION OF TOTAL REPLACEMENT OF KNEE USING ROTATING HINGE PROSTHESIS Left 2017    TOTAL KNEE REPLACEMENT USING COMPUTER NAVIGATION Right     TUBAL LIGATION  2007               Review of systems:  Review of Systems   Constitutional:  Negative for activity change, appetite change, chills, diaphoresis, fatigue and unexpected weight change.   HENT:  Negative for congestion, facial swelling, hearing loss, mouth sores, trouble swallowing and voice change.    Eyes:  Negative for photophobia,  pain, discharge and itching.   Respiratory:  Negative for apnea, cough, choking, chest tightness and shortness of breath.    Cardiovascular:  Negative for chest pain, palpitations and leg swelling.   Gastrointestinal:  Negative for abdominal distention, abdominal pain, anal bleeding and blood in stool.   Endocrine: Negative for cold intolerance, heat intolerance, polydipsia and polyphagia.   Genitourinary:  Negative for difficulty urinating, dysuria, flank pain and hematuria.   Musculoskeletal:  Positive for back pain. Negative for arthralgias, joint swelling, myalgias, neck pain and neck stiffness.   Skin:  Negative for color change, pallor and wound.   Allergic/Immunologic: Negative for environmental allergies, food allergies and immunocompromised state.   Neurological:  Negative for dizziness, seizures, facial asymmetry, speech difficulty, light-headedness, numbness and headaches.   Hematological:  Negative for adenopathy. Does not bruise/bleed easily.   Psychiatric/Behavioral:  Negative for agitation, behavioral problems, confusion, decreased concentration and sleep disturbance.                    Physical Exam  Vitals and nursing note reviewed.   Constitutional:       General: She is not in acute distress.     Appearance: Normal appearance. She is not ill-appearing.   HENT:      Head: Normocephalic and atraumatic.      Nose: No congestion or rhinorrhea.   Eyes:      General: No scleral icterus.     Extraocular Movements: Extraocular movements intact.      Pupils: Pupils are equal, round, and reactive to light.   Cardiovascular:      Rate and Rhythm: Normal rate and regular rhythm.      Pulses: Normal pulses.      Heart sounds: Normal heart sounds. No murmur heard.    No gallop.   Pulmonary:      Effort: Pulmonary effort is normal. No respiratory distress.      Breath sounds: Normal breath sounds. No stridor. No wheezing or rhonchi.   Abdominal:      General: Bowel sounds are normal. There is no distension.       Palpations: There is no mass.      Tenderness: There is no abdominal tenderness. There is no guarding.   Musculoskeletal:         General: Tenderness present. No swelling, deformity or signs of injury. Normal range of motion.      Cervical back: Normal range of motion and neck supple. No rigidity. No muscular tenderness.      Right lower leg: No edema.      Left lower leg: No edema.      Comments: R lower back b/l   Skin:     General: Skin is warm.      Coloration: Skin is not jaundiced or pale.      Findings: No bruising or lesion.   Neurological:      General: No focal deficit present.      Mental Status: She is alert and oriented to person, place, and time.      Cranial Nerves: No cranial nerve deficit.      Sensory: No sensory deficit.      Motor: No weakness.      Gait: Gait normal.   Psychiatric:         Mood and Affect: Mood normal.         Behavior: Behavior normal.         Thought Content: Thought content normal.     Vitals:    10/07/22 1035   BP: 111/71   Pulse: 78   Temp: 98.3 °F (36.8 °C)   Body surface area is 2.49 meters squared.    Assessment/Plan:      ECOG 1      1. Smoldering Myeloma:    == Preliminary workup done by her primary care provider showed patient to have elevated gammaglobulin portion on serum protein electrophoresis.  She was also noted to have elevated urine free kappa light chains along with urine free lambda light chains.  Would complete workup with serum and urine free light chain and immunofixation studies along with specific immunoglobulin levels.  = Discussed with her that in the absence of any hypercalcemia, bone disease, renal failure we could be likely looking at monoclonal gammopathy of undetermined significance insert of active multiple myeloma requiring treatment.  Previously she was noted to have normocytic normochromic anemia but on repeat labs this was found to be normal.  == 7/26/21:  Noted to have elevation of IgG kappa free light chains on repeat myeloma workup done  here in our clinic.  She does not have any CRAB features at this time but slight increase noted on urinary free kappa and lambda light chains.  Would recommend completion of workup with bone marrow biopsy and PET-CT scan to rule out any osteolytic lesions.  She does complain of ?abdominal pain but not convinced if it related to plasma cell dyscrasia.  Discussed with her that at this point in time there is no indication to start treatment but would recommend close follow-up after bone marrow biopsy and PET scan completion.  == 8/25/21: Bone marrow biopsy reveals 16 % plasma cells with normal cytogenetics. PET scan showed no osteolytic lesions. No relevant anemia nor renal insufficiency and no hypercalcemia.  No CRAB features. Involved: Uninvolved serum FLC ratio is normal. With clonal bone marrow plasma cells between 10% and 59%, fulfils criteria for Smoldering Myeloma (Asymptomatic) Low risk based on clinical parameters. Would hence continue with observation and repeat labs in 3 months.  == 12/9/21: Patient here for f/up. Reports having hematuria and had an ER visit at [facility] where she was told she had a kidney stone. She reports she continues to have hematuria and intense pain at the back b/l. This has not gotten better in the last week. I advised her to drink plenty of water and continue Flomax given by her PCP. If this does not improve next week would recommend repeat imaging with bone scan to rule out osteolytic lesions- these were previously negative at initial staging but the fact that she is crying in pain currently, my concern/recommendation is to rule out new osteolytic lesions. Advised to continue with Tylenol. She reports being allergic to codeine and does not want to take any other pain medications  == 6/15/22:  Patient presents to our clinic today after delay of 6 months.  She reports doing well in the interim and had recent labs done with her PCP. She denies any acute symptoms today. Denies blood in the  "urine. Denies acute back pain. No tenderness to palpation. Will obtain serum FLC for completion and follow up of Smoldering myeloma. If any abnormalities noted then will obtain a bone marrow biopsy and repeat PET scan.   == 9/13/22: labs reviewed negative for CRAB criteria, no FLC available for review this visit, she does report low back pain but chronic in nature and followed by PCP. Actively trying to loose weight with prescription dietary supplement, unsure of the name and is considering the  possibly of gastric sleeve surgery also reports on xarelto for upper ext DVT found in April, plan to stop in October, followed by PCP.   She has decided that she no longer wants to follow up in clinic, stating this is waste of her time, discussed that 30% of MGUS can convert and become active multiple myeloma, which will require active treatment. I strongly recommend that she continues follow up with routine lab evaluation every 3 months and she declines, I even suggested she come in at least every 6 months but again she declined.   == 10/7/22: walked into clinic today, due to c/o of left arm swelling since heart cath with Dr Quiroga back in September. Many ER visits showing neg arterial ultrasound and stable lab values. I was able to review her ER visit on 9/30/22 at [facility] and her ER visit today at [facility]. Measurements of left arm 18 cm compared to 16.5 cm to right arm. She denies any trauma but reports pain began after heart catheterization.  Bilateral Pulses are present and nornal, no redness, warmth or hardness palpated.  Patient reports she has been seen by Dr. Quiroga as well as Dr. See for this issue and was told to follow-up with Oncology due to her "cancer".  I have discussed with her today that she does not have active cancer and that she has MGUS, we are currently not providing any treatment for her and are following her every 3 months with labs.  She was last seen September 13th with no " "evidence of crab criteria and was told she continues to have MGUS.  Patient became very upset that we were not "treating her and giving her the run around".  She left that day very angry and said she was not coming back.  Patient again today reports that we are "giving her the run around" by advising her to follow-up with her PCP regarding complaints of left arm pain which shows no evidence of blood clot.  She states today that she will not be coming back to this clinic as this is a "waste of her time".       2. Preventive Medicine:    = She does not remember her last colonoscopy but reports that she should be up-to-date with that.  She also reports being on oral iron supplementation in the past.  Her last iron profile done by her PCP showed normal iron and TIBC  = Advised to continue with screening mammograms every year per guidelines and to discuss with PCP about being up to date on C-scopies. She voices understanding    Plan:  Myeloma f/up labs    RTC in 3 months for MD visit with labs: CBC, CMP, FLC prior if patient desires appointment    Pt is instructed to RTC with labs for continued monitoring of treatment as instructed.     Total time spent in counseling and discussion about further management options including relevant lab work, treatment,  prognosis, medications and intended side effects was more than 25 minutes. More than 50 % of the time was spent in counseling and coordination of care.  I spent a total of 25 minutes on the day of the visit.This includes face to face time and non-face to face time preparing to see the patient (eg, review of tests), Obtaining and/or reviewing separately obtained history, Documenting clinical information in the electronic or other health record, Independently interpreting resultsand communicating results to the patient/family/caregiver, or Care coordination.       "

## 2022-11-09 ENCOUNTER — OFFICE VISIT (OUTPATIENT)
Dept: INFECTIOUS DISEASES | Facility: CLINIC | Age: 52
End: 2022-11-09
Payer: MEDICARE

## 2022-11-09 VITALS
DIASTOLIC BLOOD PRESSURE: 66 MMHG | BODY MASS INDEX: 40.01 KG/M2 | HEART RATE: 85 BPM | WEIGHT: 264 LBS | SYSTOLIC BLOOD PRESSURE: 118 MMHG | OXYGEN SATURATION: 98 % | HEIGHT: 68 IN

## 2022-11-09 DIAGNOSIS — B37.49 CANDIDAL URINARY TRACT INFECTION: ICD-10-CM

## 2022-11-09 DIAGNOSIS — D47.2 MGUS (MONOCLONAL GAMMOPATHY OF UNKNOWN SIGNIFICANCE): ICD-10-CM

## 2022-11-09 DIAGNOSIS — L73.2 HIDRADENITIS SUPPURATIVA OF LEFT AXILLA: ICD-10-CM

## 2022-11-09 DIAGNOSIS — I89.0 LYMPHEDEMA OF UPPER EXTREMITY, BILATERAL: Primary | ICD-10-CM

## 2022-11-09 DIAGNOSIS — B37.2 CANDIDAL INTERTRIGO: ICD-10-CM

## 2022-11-09 PROCEDURE — 3078F PR MOST RECENT DIASTOLIC BLOOD PRESSURE < 80 MM HG: ICD-10-PCS | Mod: CPTII,S$GLB,, | Performed by: NURSE PRACTITIONER

## 2022-11-09 PROCEDURE — 3074F SYST BP LT 130 MM HG: CPT | Mod: CPTII,S$GLB,, | Performed by: NURSE PRACTITIONER

## 2022-11-09 PROCEDURE — 1159F PR MEDICATION LIST DOCUMENTED IN MEDICAL RECORD: ICD-10-PCS | Mod: CPTII,S$GLB,, | Performed by: NURSE PRACTITIONER

## 2022-11-09 PROCEDURE — 3008F BODY MASS INDEX DOCD: CPT | Mod: CPTII,S$GLB,, | Performed by: NURSE PRACTITIONER

## 2022-11-09 PROCEDURE — 1159F MED LIST DOCD IN RCRD: CPT | Mod: CPTII,S$GLB,, | Performed by: NURSE PRACTITIONER

## 2022-11-09 PROCEDURE — 3008F PR BODY MASS INDEX (BMI) DOCUMENTED: ICD-10-PCS | Mod: CPTII,S$GLB,, | Performed by: NURSE PRACTITIONER

## 2022-11-09 PROCEDURE — 99215 PR OFFICE/OUTPT VISIT, EST, LEVL V, 40-54 MIN: ICD-10-PCS | Mod: S$GLB,,, | Performed by: NURSE PRACTITIONER

## 2022-11-09 PROCEDURE — 3078F DIAST BP <80 MM HG: CPT | Mod: CPTII,S$GLB,, | Performed by: NURSE PRACTITIONER

## 2022-11-09 PROCEDURE — 3074F PR MOST RECENT SYSTOLIC BLOOD PRESSURE < 130 MM HG: ICD-10-PCS | Mod: CPTII,S$GLB,, | Performed by: NURSE PRACTITIONER

## 2022-11-09 PROCEDURE — 99215 OFFICE O/P EST HI 40 MIN: CPT | Mod: S$GLB,,, | Performed by: NURSE PRACTITIONER

## 2022-11-09 RX ORDER — LANCETS
EACH MISCELLANEOUS
COMMUNITY
Start: 2022-10-16

## 2022-11-09 RX ORDER — OMEPRAZOLE 20 MG/1
20 CAPSULE, DELAYED RELEASE ORAL DAILY
COMMUNITY
Start: 2022-10-24

## 2022-11-09 RX ORDER — DAPAGLIFLOZIN 5 MG/1
5 TABLET, FILM COATED ORAL DAILY
COMMUNITY
Start: 2022-10-27 | End: 2023-01-04

## 2022-11-09 RX ORDER — BLOOD SUGAR DIAGNOSTIC
STRIP MISCELLANEOUS
COMMUNITY
Start: 2022-10-26

## 2022-11-09 RX ORDER — FLUCONAZOLE 100 MG/1
100 TABLET ORAL DAILY
Qty: 7 TABLET | Refills: 0 | Status: SHIPPED | OUTPATIENT
Start: 2022-11-09 | End: 2022-12-09

## 2022-11-09 RX ORDER — METOCLOPRAMIDE 10 MG/1
30 TABLET ORAL 2 TIMES DAILY
COMMUNITY
Start: 2022-07-23

## 2022-11-09 NOTE — ASSESSMENT & PLAN NOTE
She states this is what prompted her LUE lymphedema. She does have a significant keloid in Left axilla.

## 2022-11-09 NOTE — PROGRESS NOTES
"Infectious Diseases Clinic Note    Subjective:       Patient ID: Nancy Sun is a 52 y.o. female     Chief Complaint: Other (Lymphedema)        Patient is a 51-year-old female with past medical history of hypertension, morbid obesity, insomnia, hypergammaglobulinemia, gastroesophageal reflux disease without esophagitis, chronic idiopathic constipation      LUE lymphedema started around 3 years ago after having 3 surgeries for HS.   RUE started after having a heart cath recently. She does admit to bilateral axillary rashes and rash under her left breast.   She states "antibiotics make the problem worse".  She has seen lymphedema specialists and was told to wear compression sleeves. This does help some. She has a port for blood draw. She denies fever, chills, lymphadenopathy. Denies neck pain or nuchal rigidity.     She denies having Cats and/or dogs in the home. No recent travel. No tick bites or exposure. No recent blood transfusion or transplant. Denies IVDU. Denies high risk behaviors.            Pathology: 8/14/21     PERIPHERAL SMEAR REVIEW, BONE MARROW ASPIRATION, BONE MARROW BIOPSY AND FLOW   CYTOMETRIC EVALUATION:      1.  HISTORY OF MGUS.      2.  16% PLASMA CELLS IDENTIFIED, POLYCLONAL BY BOTH IMMUNOHISTOCHEMICAL   STUDIES AND FLOW       CYTOMETRIC EVALUATION.      3.  ANEMIA, NORMOCHROMIC AND NORMOCYTIC.      4.  IRON 0 OF 6+.      5.  NO EVIDENCE IS SEEN OR ANY MORPHOLOGIC ABNORMALITY NOTED IN THIS   MARROW EXAMINATION.      6.  SEE BELOW.   Comment:  The patient present with a history of monoclonal gammopathy.   There is an increase in plasma cells to approximately 16% by a 1,000 cell   count on blocks 1A, the biopsy, and 1B, the clot section.  However, kappa   and lambda light chain staining on these reveal polyclonal plasma cells as   does the flow cytometric evaluation (see below).  Thus there is a   plasmacytosis which most likely is reactive in nature.  The MGUS may be   exactly that in that no " definitive plasma cell dyscrasia can be identified   in any study.  There is an anemia which is normochromic and normocytic in   nature.  However iron is absent on iron stains.  Therefore iron deficiency   may have some contribution to the anemia     CYTOGENETICS REPORT:   KARYOTYPE:   -[id number],XX [20]   -NORMAL FEMALE KARYOTYPE   INTERPRETATION: Cytogenetic analysis of the bone marrow aspirate reveals an   NORMAL female karyotype.         Past Medical History:   Diagnosis Date    Abdominal pain     Arthritis     Constipation     COPD (chronic obstructive pulmonary disease)     Generalized headaches     GERD (gastroesophageal reflux disease)     Heart murmur     Hypertension     Sleep disorder     Smoldering myeloma 1/19/2022       Social History     Socioeconomic History    Marital status: Single   Occupational History    Occupation: none   Tobacco Use    Smoking status: Never    Smokeless tobacco: Never   Substance and Sexual Activity    Alcohol use: No    Drug use: No    Sexual activity: Never         Current Outpatient Medications:     apixaban (ELIQUIS) 5 mg Tab, Take 1 tablet (5 mg total) by mouth 2 (two) times daily., Disp: 60 tablet, Rfl: 2    carvediloL (COREG) 25 MG tablet, Take 1 tablet (25 mg total) by mouth 2 (two) times daily with meals., Disp: 60 tablet, Rfl: 3    cyclobenzaprine (FLEXERIL) 10 MG tablet, TAKE ONE TABLET BY MOUTH THREE TIMES DAILY AS NEEDED FPOR SPASMS, Disp: , Rfl:     FARXIGA 5 mg Tab tablet, Take 5 mg by mouth once daily., Disp: , Rfl:     metoclopramide HCl (REGLAN) 10 MG tablet, Take 30 mg by mouth 2 (two) times daily., Disp: , Rfl:     omeprazole (PRILOSEC) 20 MG capsule, Take 20 mg by mouth once daily., Disp: , Rfl:     ONETOUCH ULTRA TEST Strp, USE TEST STRIP WITH GLUCOSE METER TO TEST BLOOD SUGAR TWICE DAILY AS NEEDED., Disp: , Rfl:     ONETOUCH ULTRASOFT LANCETS lancets, USE ONE LANCET TO TEST BLOOD GLUCOSE TWICE DAILY., Disp: , Rfl:     pantoprazole (PROTONIX) 40 MG tablet, Take  1 tablet (40 mg total) by mouth once daily., Disp: 30 tablet, Rfl: 11    spironolactone (ALDACTONE) 50 MG tablet, Take 1 tablet (50 mg total) by mouth 2 (two) times daily., Disp: 60 tablet, Rfl: 11    zolpidem (AMBIEN) 5 MG Tab, Take 1 tablet (5 mg total) by mouth nightly as needed., Disp: 30 tablet, Rfl: 0    fluconazole (DIFLUCAN) 100 MG tablet, Take 1 tablet (100 mg total) by mouth once daily., Disp: 7 tablet, Rfl: 0    Review of Systems   Constitutional:  Negative for activity change, appetite change, chills, diaphoresis, fatigue and unexpected weight change.   HENT:  Negative for congestion, facial swelling, hearing loss, mouth sores, trouble swallowing and voice change.    Eyes:  Negative for photophobia, pain, discharge and itching.   Respiratory:  Negative for apnea, cough, choking, chest tightness and shortness of breath.    Cardiovascular:  Negative for chest pain, palpitations and leg swelling.   Gastrointestinal:  Negative for abdominal distention, abdominal pain, anal bleeding and blood in stool.   Endocrine: Negative for cold intolerance, heat intolerance, polydipsia and polyphagia.   Genitourinary:  Negative for difficulty urinating, dysuria, flank pain and hematuria.   Musculoskeletal:  Positive for back pain. Negative for arthralgias, joint swelling, myalgias, neck pain and neck stiffness.   Skin:  Positive for rash. Negative for color change, pallor and wound.   Allergic/Immunologic: Negative for environmental allergies, food allergies and immunocompromised state.   Neurological:  Negative for dizziness, tremors, seizures, facial asymmetry, speech difficulty, weakness, light-headedness, numbness and headaches.   Hematological:  Negative for adenopathy. Does not bruise/bleed easily.   Psychiatric/Behavioral:  Negative for agitation, behavioral problems, confusion, decreased concentration and sleep disturbance.          Objective:      Vitals:    11/09/22 1402   BP: 118/66   Pulse: 85     Physical  Exam  Vitals and nursing note reviewed.   Constitutional:       General: She is not in acute distress.     Appearance: Normal appearance. She is not ill-appearing.   HENT:      Head: Normocephalic and atraumatic.      Nose: No congestion or rhinorrhea.   Eyes:      General: No scleral icterus.     Extraocular Movements: Extraocular movements intact.      Pupils: Pupils are equal, round, and reactive to light.   Cardiovascular:      Rate and Rhythm: Normal rate and regular rhythm.      Pulses: Normal pulses.      Heart sounds: Normal heart sounds. No murmur heard.    No gallop.   Pulmonary:      Effort: Pulmonary effort is normal. No respiratory distress.      Breath sounds: Normal breath sounds. No stridor. No wheezing or rhonchi.   Abdominal:      General: Bowel sounds are normal. There is no distension.      Palpations: There is no mass.      Tenderness: There is no abdominal tenderness. There is no guarding.   Musculoskeletal:         General: Tenderness present. No swelling, deformity or signs of injury. Normal range of motion.      Cervical back: Normal range of motion and neck supple. No rigidity. No muscular tenderness.      Right lower leg: No edema.      Left lower leg: No edema.      Comments: No upper extremity asymmetry noted. No erythema, warmth, streaking noted. NV intact distally.    Skin:     General: Skin is warm.      Coloration: Skin is not jaundiced or pale.      Findings: Rash present. No bruising or lesion.          Neurological:      General: No focal deficit present.      Mental Status: She is alert and oriented to person, place, and time.      Cranial Nerves: No cranial nerve deficit.      Sensory: No sensory deficit.      Motor: No weakness.      Gait: Gait normal.   Psychiatric:         Mood and Affect: Mood normal.         Behavior: Behavior normal.         Thought Content: Thought content normal.           Assessment/Plan:       1. Lymphedema of upper extremity, bilateral    2. Candidal  intertrigo    3. MGUS (monoclonal gammopathy of unknown significance)    4. Candidal urinary tract infection    5. Hidradenitis suppurativa of left axilla      Problem List Items Addressed This Visit          Derm    Hidradenitis suppurativa of left axilla    Current Assessment & Plan     She states this is what prompted her LUE lymphedema. She does have a significant keloid in Left axilla.                 Oncology    MGUS (monoclonal gammopathy of unknown significance)    Relevant Orders    HIV 1/2 Ag/Ab (4th Gen)    Treponema Pallidum (Syphillis) Antibody    Candida albicans Antibodies (IgG, IgA, IgM)    Blood culture    B. burgdorferi Abs (Lyme Disease)    Immunoglobulins (IgG, IgA, IgM) Quantitative    IgE    C-Reactive Protein    Sedimentation rate    Angiotensin Converting Enzyme    QuantiFERON-TB Gold Plus    Misc Sendout Test, Blood AVISE CTD    Protein Electrophoresis, Serum    Flow Cytometry Analysis (Peripheral Blood)     Other Visit Diagnoses       Lymphedema of upper extremity, bilateral    -  Primary    I recommend aggressively targeting candida to rule out reactive response. Will get labs. f/u in 3 weeks. doubtful this is infectious pathology.     Relevant Orders    HIV 1/2 Ag/Ab (4th Gen)    Treponema Pallidum (Syphillis) Antibody    Candida albicans Antibodies (IgG, IgA, IgM)    Blood culture    B. burgdorferi Abs (Lyme Disease)    Immunoglobulins (IgG, IgA, IgM) Quantitative    IgE    C-Reactive Protein    Sedimentation rate    Angiotensin Converting Enzyme    QuantiFERON-TB Gold Plus    Misc Sendout Test, Blood AVISE CTD    Protein Electrophoresis, Serum    Flow Cytometry Analysis (Peripheral Blood)    Candidal intertrigo        Bilateral axilla and L breast.     Relevant Medications    fluconazole (DIFLUCAN) 100 MG tablet    Other Relevant Orders    HIV 1/2 Ag/Ab (4th Gen)    Treponema Pallidum (Syphillis) Antibody    Candida albicans Antibodies (IgG, IgA, IgM)    Blood culture    B. burgdorferi  "Abs (Lyme Disease)    Immunoglobulins (IgG, IgA, IgM) Quantitative    IgE    C-Reactive Protein    Sedimentation rate    Angiotensin Converting Enzyme    QuantiFERON-TB Gold Plus    Misc Sendout Test, Blood AVISE CTD    Protein Electrophoresis, Serum    Flow Cytometry Analysis (Peripheral Blood)    Candidal urinary tract infection        Relevant Medications    fluconazole (DIFLUCAN) 100 MG tablet                 Hematology note =     == 10/7/22: walked into clinic today, due to c/o of left arm swelling since heart cath with Dr Quiroga back in September. Many ER visits showing neg arterial ultrasound and stable lab values. I was able to review her ER visit on 9/30/22 at [facility] and her ER visit today at [facility]. Measurements of left arm 18 cm compared to 16.5 cm to right arm. She denies any trauma but reports pain began after heart catheterization.  Bilateral Pulses are present and nornal, no redness, warmth or hardness palpated.  Patient reports she has been seen by Dr. Quiroga as well as Dr. See for this issue and was told to follow-up with Oncology due to her "cancer".  I have discussed with her today that she does not have active cancer and that she has MGUS, we are currently not providing any treatment for her and are following her every 3 months with labs.  She was last seen September 13th with no evidence of crab criteria and was told she continues to have MGUS.  Patient became very upset that we were not "treating her and giving her the run around".  She left that day very angry and said she was not coming back.  Patient again today reports that we are "giving her the run around" by advising her to follow-up with her PCP regarding complaints of left arm pain which shows no evidence of blood clot.  She states today that she will not be coming back to this clinic as this is a "waste of her time".       10/31/22 12:10:   Red Blood Count 4.25, Mean Corpuscular Volume 91.1, Mean Corpuscular " Hemoglobin 29.4, Mean Corpuscular Hemoglobin Concent 32.3, Red Cell Distribution Width 13.5, Neutrophils (%) (Auto) 51.0, Lymphocytes (%) (Auto) 35.7, Monocytes (%) (Auto) 9.5, Eosinophils (%) (Auto) 2.6, Basophils (%) (Auto) 0.8, Neutrophils # (Auto) 3.78, Nucleated Red Blood Cells % 0.0, Urine Source Cl Catch [place], Urine Color Maria Del Rosario, Urine Appearance Clear, Urine pH 6.0, Urine Specific Gravity 1.015, Urine Protein Negative, Urine Glucose (UA) 1000, Urine Ketones Negative, Urine Occult Blood 10, Urine Nitrite Negative, Urine Bilirubin Negative, Urine Urobilinogen Normal, Urine Leukocyte Esterase Negative, Urine RBC 0-2, Urine WBC 0-2, Urine Squamous Epithelial Cells 1+ Few, Urine Bacteria +/- Rare, Urine Yeast 1+ Few, Urine Culture Indicated No, Criteria Not Met, Carbon Dioxide Level 33, Anion Gap 2.0, Estimat Glomerular Filtration Rate > 60, BUN/Creatinine Ratio 20.22, Calcium Level 8.8, Total Bilirubin 0.5, Aspartate Amino Transf (AST/SGOT) 20, Alanine Aminotransferase (ALT/SGPT) 36, Alkaline Phosphatase 129, Total Protein 8.5, Albumin 3.7, Globulin 4.8, Albumin/Globulin Ratio 0.770, Lipase 123      CMS MANDATED QUALITY DATA - CT RADIATION - 436    All CT scans at this facility use dose modulation, iterative-reconstruction, and/or weight-based dosing when appropriate to reduce radiation dose to as low as reasonably achievable.    CT Abdomen and Pelvis without contrast    HISTORY: Left lower quadrant abdominal pain    TECHNIQUE: Serial axial images were obtained through the abdomen and pelvis.  No intravenous contrast was administered prior to image acquisition. Coronal and sagittal reconstructions were performed. Patient received 11.2 mSv of radiation exposure from this exam.    COMPARISON: No previous study available for comparison.    FINDINGS:    Lung bases: Clear.    Liver: No focal hepatic lesions or biliary ductal dilatation.    Gallbladder: Removed.    Spleen: Normal size.    Pancreas:  Unremarkable.    Adrenal glands: Unremarkable.    Kidneys: Kidneys are symmetric and there is no hydronephrosis.    Stomach and bowel: Stomach and bowel are normal caliber. There is an anastomosis in the central abdomen to level the umbilicus which is unchanged. Scattered colonic diverticula but no evidence of diverticulitis.    Peritoneum/retroperitoneum: No free air or free fluid. No drainable fluid collections. No pathologically enlarged lymph nodes.    Pelvic structures: Unchanged.    Vessels: Unremarkable.    Bones and soft tissues: Surgical clips noted throughout the anterior abdomen. No acute abnormalities.      IMPRESSION:      No acute abnormalities in the abdomen or pelvis. There are scattered colonic diverticula but no evidence of diverticulitis.    DICTATING PHYSICIAN:  SUE CRABTREE MD                Date Dictated: 10/31/22 1323    Signed By:  SUE CRABTREE MD <Electronically signed by SUE CRABTREE MD in OV>                                        Date Signed:  10/31/22 1330    UPPER EXTREMITY W/O CONTRAST Right  CMS MANDATED QUALITY DATA CT RADIATION 436  *All CT scans at this facility uses dose modulation, iterative reconstruction  and/or weight-based dosing when appropriate to reduce radiation dose to as low as reasonably achievable.    Clinical data:  Right upper chest swelling    Technique:  Acquisition: Contiguous scan slices were obtained from the right shoulder to the right elbow  Reconstructions: Axial, coronal and sagittal reconstructions of the data set were obtained.  Contrast:  IV: No contrast was administered for the examination.  Other: None given.  Phases: Noncontrasted images.  Limitations: No technical limitations.  Estimated radiation dose: 0.4 mSv.    Comparison:  No prior relevant studies are available.    Findings:  The bony elements appear intact. No fractures are seen. No areas of bony destruction are noted.    The muscular structures appear unremarkable.    No masses or fluid  collections are seen. No adenopathy is seen.    Impression:    1.  Negative exam.        This document was created using a voice recognitio    No visits with results within 1 Month(s) from this visit.   Latest known visit with results is:   Orders Only on 09/02/2022   Component Date Value Ref Range Status    Sodium 09/06/2022 141  135 - 145 mmol/L Final    Potassium 09/06/2022 4.6  3.6 - 5.2 mmol/L Final    Chloride 09/06/2022 104  100 - 108 mmol/L Final    CO2 09/06/2022 28  21 - 32 mmol/L Final    Anion Gap 09/06/2022 9.0  3.0 - 11.0 mmol/L Final    BUN 09/06/2022 18  7 - 18 mg/dL Final    Creatinine 09/06/2022 0.85  0.55 - 1.02 mg/dL Final    GFR ESTIMATION 09/06/2022 > 60  >60 Final               DESCRIPTION       GLOMERULAR FILTRATION RATE             NORMAL                     >60             KIDNEY  DISEASE           15-60             KIDNEY FAILURE             <15    BUN/Creatinine Ratio 09/06/2022 21.17 (H)  7 - 18 Ratio Final    Glucose 09/06/2022 88  70 - 110 mg/dL Final    Calcium 09/06/2022 9.0  8.8 - 10.5 mg/dL Final    Total Bilirubin 09/06/2022 0.4  0.0 - 1.0 mg/dL Final    AST 09/06/2022 17  15 - 37 U/L Final    ALT 09/06/2022 21  12 - 78 U/L Final    Total Protein 09/06/2022 8.0  6.4 - 8.2 g/dL Final    Albumin 09/06/2022 3.6  3.4 - 5.0 g/dL Final    Globulin 09/06/2022 4.4 (H)  2.3 - 3.5 g/dL Final    Albumin/Globulin Ratio 09/06/2022 0.818 (L)  1.1 - 1.8 Ratio Final    Alkaline Phosphatase 09/06/2022 126 (H)  46 - 116 U/L Final    WBC 09/06/2022 7.7  4.6 - 10.2 10*3/uL Final    RBC 09/06/2022 4.16 (L)  4.2 - 5.4 10*6/uL Final    Hemoglobin 09/06/2022 12.0  12.0 - 16.0 g/dL Final    Hematocrit 09/06/2022 37.4  37.0 - 47.0 % Final    MCV 09/06/2022 89.9  80 - 97 fL Final    MCH 09/06/2022 28.8  27.0 - 31.2 pg Final    MCHC 09/06/2022 32.1  31.8 - 35.4 g/dL Final    RDW RBC Auto-Rto 09/06/2022 13.8  12.5 - 18.0 % Final    Platelets 09/06/2022 219  142 - 424 10*3/uL Final    Neutrophils 09/06/2022  57.0  37 - 80 % Final    Lymphocytes 09/06/2022 30.7  25 - 40 % Final    Monocytes 09/06/2022 8.8  1 - 15 % Final    Eosinophils 09/06/2022 2.5  1 - 3 % Final    Basophils 09/06/2022 0.5  0 - 3 % Final    nRBC# 09/06/2022 0.0  % Final    Neutrophils Absolute 09/06/2022 4.41  1.8 - 7.7 10*3/uL Final    Kappa Free Light Chains 09/07/2022 30.49 (A)  3.30 - 19.40 mg/L Final    INTERPRETIVE INFORMATION: Kappa Qnt Free Light ChainsUndetected antigen excess is a rare event but cannot beexcluded. Free light chain results should always be interpretedin conjunction with other clinical and laboratory findings.    Lambda Light Chain, Free, Serum 09/07/2022 24.30  5.71 - 26.30 mg/L Final    INTERPRETIVE INFORMATION: Lambda Qnt Free Light ChainsUndetected antigen excess is a rare event but cannot beexcluded. Free light chain results should always be interpretedin conjunction with other clinical and laboratory findings.    Kappa/Lambda FLC Ratio 09/07/2022 1.25  0.26 - 1.65 Final    Performed By: [facility] [place] [id number] Director: Koffi Hancock MD, PhD      No results found in the last 30 days.      Duration of encounter: 60 minutes  This includes face-to-face time and non face-to-face time preparing to see the patient (eg, review of tests), obtaining and/or reviewing separately obtained history, documenting clinical information in the electronic or other health record, independently interpreting resultsand communicating results to the patient/family/caregiver, or care coordination.      All diagnostic data (labs/imaging) was reviewed with the patient and/or family member in the room.  All questions were answered to their liking. The patient and/or family member voiced understanding of all instructions provided. Expectations regarding follow up and treatment plan were voiced and confirmed prior to departure. The patient was given orders/instructions at the end of the visit for  reference. They were instructed to notify my office if they have not been contacted for imaging/referrals/labs/results in 1-2 weeks. They voiced understanding of all of the above.     Follow up:     There are no Patient Instructions on file for this visit.     Follow up in about 3 weeks (around 11/30/2022), or if symptoms worsen or fail to improve.

## 2022-11-10 ENCOUNTER — TELEPHONE (OUTPATIENT)
Dept: FAMILY MEDICINE | Facility: CLINIC | Age: 52
End: 2022-11-10
Payer: MEDICARE

## 2022-11-10 NOTE — TELEPHONE ENCOUNTER
----- Message from Eulalia Bolaños MA sent at 11/10/2022  9:06 AM CST -----  Regarding: FW: pt advice  Contact: pt    ----- Message -----  From: Cindy Sanford  Sent: 11/10/2022   8:47 AM CST  To: [facility] Staff  Subject: pt advice                                        Pt is calling to ask why there are orders for STD testing. Also states that her ins does not cover the Flow Cytometry Analysis (Peripheral Blood). Please call back at [phone]//thank you acc

## 2022-11-17 PROBLEM — I89.0 LYMPHEDEMA OF UPPER EXTREMITY, BILATERAL: Status: ACTIVE | Noted: 2022-11-17

## 2022-11-17 PROBLEM — R76.8 ANA POSITIVE: Status: ACTIVE | Noted: 2022-11-17

## 2022-11-17 NOTE — ASSESSMENT & PLAN NOTE
Pathology: 8/14/21     PERIPHERAL SMEAR REVIEW, BONE MARROW ASPIRATION, BONE MARROW BIOPSY AND FLOW   CYTOMETRIC EVALUATION:      1.  HISTORY OF MGUS.      2.  16% PLASMA CELLS IDENTIFIED, POLYCLONAL BY BOTH IMMUNOHISTOCHEMICAL   STUDIES AND FLOW       CYTOMETRIC EVALUATION.      3.  ANEMIA, NORMOCHROMIC AND NORMOCYTIC.      4.  IRON 0 OF 6+.      5.  NO EVIDENCE IS SEEN OR ANY MORPHOLOGIC ABNORMALITY NOTED IN THIS   MARROW EXAMINATION.      6.  SEE BELOW.   Comment:  The patient present with a history of monoclonal gammopathy.   There is an increase in plasma cells to approximately 16% by a 1,000 cell   count on blocks 1A, the biopsy, and 1B, the clot section.  However, kappa   and lambda light chain staining on these reveal polyclonal plasma cells as   does the flow cytometric evaluation (see below).  Thus there is a   plasmacytosis which most likely is reactive in nature.  The MGUS may be   exactly that in that no definitive plasma cell dyscrasia can be identified   in any study.  There is an anemia which is normochromic and normocytic in   nature.  However iron is absent on iron stains.  Therefore iron deficiency   may have some contribution to the anemia     CYTOGENETICS REPORT:   KARYOTYPE:   -46,XX [20]   -NORMAL FEMALE KARYOTYPE   INTERPRETATION: Cytogenetic analysis of the bone marrow aspirate reveals an   NORMAL female karyotype.

## 2022-11-17 NOTE — ASSESSMENT & PLAN NOTE
CATHI IGG positive    CATHI by Hep-2 negative     Anti-thyroglobulin IGG Equivocal     Lupus negative    RF/anti-ccp negative         PLAN    1. Recommend rechecking autoimmune thyroid labs via PCP. THE PATIENT WAS GIVEN A HARD COPY OF LABS WITH NOTATIONS OF WHAT LABS CAN BE REPEATED WITH PCP.

## 2022-11-17 NOTE — ASSESSMENT & PLAN NOTE
SMEAR = no diagnostic immunophenotypic abnormalities detected via flow cytometry.      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  Elevation in the beta region can be seen with hyperlipoproteinemia, and acute inflammation.     Gamma 1.72  Bet 1.33         IGG 1713       CRP 8.8    ESR 59     HIV non-reactive    RPR non-reactive       BLOOD CULTURE CC    REPORT CCMicrobiology results  Blood Source  VENIPUNCTURE  RESULT:  [11/11/2022 11:01 GISSEL Pringle]      No  Aerobic/Anaerobic Growth overnight  [11/12/2022 07:42 GISSEL Pringle]      No  Aerobic/Anaerobic Growth at 2 days  [11/16/2022 07:52 GISSEL Nix]      No  Aerobic/Anaerobic Growth at 5 Days  Infection Control Micro  [11/16/2022 07:52 GISSEL Nix] These  results do not meet the criteria for  submitting reports to the [facility]    BLOOD CULTURE SITE VENIPUNCTURE      Candida albicans Antibodies (IgG, IgA, IgM) EZ    ALBICANS IGG EZ1.1 H Index    ALBICANS IGA EZ1.9 H Index    ALBICANS IGM EZ0.4 Index     REFERENCE RANGE:  <1.0     INTERPRETIVE CRITERIA:            <1.0   Antibody Not Detected      > or = 1.0   Antibody Detected          QUANTIFERON(R)-TB GOLD PLUS, 1 TUBE RGA    QuantiFERON-TB Plus, NEGATIVE NEGATIVE  Negative test result. M. tuberculosis complex   infection unlikely.    NIL RGA0.01 IU/mL    Mitogen-NIL RGA>10.00 IU/mL    TB1-NIL RGA0.02 IU/mL  KAYLA ALVAREZ Page: 1 (Continued)      LYME DISEASE AB W/RF TO BLOT IGG/IGM IG    LYME ANTIBODY SCREEN IG<0.90 index                     Index                Interpretation                     -----                --------------                     < 0.90               Negative                     0.90-1.09            Equivocal                     > 1.09               Positive      Angiotensin Converting Enzyme IG    Angiotensin Enzyme 26 9-67 U/L        PLAN      Case discussed w/ Dr. Francis Katz.     The patient has numerous  "inflammatory/reactive markers. She was treated for candida albicans. This is intertriginous and not candidemia, so a 7 day fluconazole regimen is appropriate.          1. 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  Would like to get a biopsy to confirm this is what we are dealing with and she is not resistant to fluconazole. She deferred this.   2. She did agree to Dr. Leary doing the biopsy and get susceptibility testing.   3. I also recommend working w/ Lymphedema specialist and her PCP. This is not 2/t infectious disease.   "

## 2022-11-21 DIAGNOSIS — I26.99 ACUTE PULMONARY EMBOLISM WITHOUT ACUTE COR PULMONALE, UNSPECIFIED PULMONARY EMBOLISM TYPE: ICD-10-CM

## 2022-11-21 RX ORDER — APIXABAN 5 MG/1
TABLET, FILM COATED ORAL
Qty: 60 TABLET | Refills: 0 | OUTPATIENT
Start: 2022-11-21

## 2022-11-30 ENCOUNTER — OFFICE VISIT (OUTPATIENT)
Dept: INFECTIOUS DISEASES | Facility: CLINIC | Age: 52
End: 2022-11-30
Payer: MEDICARE

## 2022-11-30 VITALS — HEART RATE: 82 BPM | OXYGEN SATURATION: 97 % | SYSTOLIC BLOOD PRESSURE: 121 MMHG | DIASTOLIC BLOOD PRESSURE: 70 MMHG

## 2022-11-30 DIAGNOSIS — I89.0 LYMPHEDEMA OF UPPER EXTREMITY, BILATERAL: Primary | ICD-10-CM

## 2022-11-30 DIAGNOSIS — R76.8 ANA POSITIVE: ICD-10-CM

## 2022-11-30 DIAGNOSIS — R70.0 ESR RAISED: ICD-10-CM

## 2022-11-30 DIAGNOSIS — Z71.2 ENCOUNTER TO DISCUSS TEST RESULTS: ICD-10-CM

## 2022-11-30 DIAGNOSIS — R79.82 CRP ELEVATED: ICD-10-CM

## 2022-11-30 DIAGNOSIS — B37.9 CANDIDA ALBICANS INFECTION: ICD-10-CM

## 2022-11-30 DIAGNOSIS — D47.2 MGUS (MONOCLONAL GAMMOPATHY OF UNKNOWN SIGNIFICANCE): ICD-10-CM

## 2022-11-30 DIAGNOSIS — B37.2 CANDIDAL INTERTRIGO: ICD-10-CM

## 2022-11-30 DIAGNOSIS — L73.2 HIDRADENITIS SUPPURATIVA OF LEFT AXILLA: ICD-10-CM

## 2022-11-30 DIAGNOSIS — D89.0 POLYCLONAL GAMMOPATHY: ICD-10-CM

## 2022-11-30 DIAGNOSIS — R76.8 HIGH TOTAL IGG: ICD-10-CM

## 2022-11-30 PROCEDURE — 1159F MED LIST DOCD IN RCRD: CPT | Mod: CPTII,S$GLB,, | Performed by: NURSE PRACTITIONER

## 2022-11-30 PROCEDURE — 3078F DIAST BP <80 MM HG: CPT | Mod: CPTII,S$GLB,, | Performed by: NURSE PRACTITIONER

## 2022-11-30 PROCEDURE — 99214 PR OFFICE/OUTPT VISIT, EST, LEVL IV, 30-39 MIN: ICD-10-PCS | Mod: S$GLB,,, | Performed by: NURSE PRACTITIONER

## 2022-11-30 PROCEDURE — 3074F SYST BP LT 130 MM HG: CPT | Mod: CPTII,S$GLB,, | Performed by: NURSE PRACTITIONER

## 2022-11-30 PROCEDURE — 3074F PR MOST RECENT SYSTOLIC BLOOD PRESSURE < 130 MM HG: ICD-10-PCS | Mod: CPTII,S$GLB,, | Performed by: NURSE PRACTITIONER

## 2022-11-30 PROCEDURE — 1159F PR MEDICATION LIST DOCUMENTED IN MEDICAL RECORD: ICD-10-PCS | Mod: CPTII,S$GLB,, | Performed by: NURSE PRACTITIONER

## 2022-11-30 PROCEDURE — 99214 OFFICE O/P EST MOD 30 MIN: CPT | Mod: S$GLB,,, | Performed by: NURSE PRACTITIONER

## 2022-11-30 PROCEDURE — 3078F PR MOST RECENT DIASTOLIC BLOOD PRESSURE < 80 MM HG: ICD-10-PCS | Mod: CPTII,S$GLB,, | Performed by: NURSE PRACTITIONER

## 2022-11-30 RX ORDER — NYSTATIN 100000 [USP'U]/G
POWDER TOPICAL 2 TIMES DAILY
Qty: 60 G | Refills: 11 | Status: SHIPPED | OUTPATIENT
Start: 2022-11-30

## 2022-11-30 NOTE — PATIENT INSTRUCTIONS
"Hidradenitis Suppurativa   The Basics   Written by the doctors and editors at [facility]   What is hidradenitis suppurativa? -- Hidradenitis suppurativa, or "HS," is a condition that causes swollen, painful bumps to form on the body. The bumps usually appear in places where the skin rubs together. They can cause so much pain that they make it hard to move. They can smell bad or drain pus or blood. The bumps also tend to last for weeks or months and keep coming back.  People who have HS often have a hard time dealing with their problem. It can make them feel embarrassed and worried. Sometimes the condition can even cause problems in relationships, or in the workplace. If you have this problem, see a doctor or nurse. There are treatments that can help you.  What are the symptoms of HS? -- The main symptoms are swollen, painful bumps. These bumps can drain pus or blood.  The bumps usually form in places where the skin rubs together. Common locations include:  Armpits  On or under the breasts  Groin area  Inner thighs  Buttocks  Around or near the anus  The skin problems caused by HS last a long time and get worse over time. Often the skin hardens and scars around the painful bumps. Many bumps can form in a single area and sometimes form tunnels under the skin (figure 1).  Should I see a doctor or nurse? -- Yes. If you have symptoms of HS, see a doctor or nurse. They can look at your skin and find out if HS is the cause of your symptoms. Make sure you tell the doctor or nurse if you feel sad, upset, or embarrassed because of your symptoms. They can help you deal with these problems.  It's also important to see your doctor regularly if you have HS. People with HS have a higher chance of getting other health problems, too, such as diabetes and heart disease. Your doctor can check for these problems and suggest treatment if needed.  How is HS treated? -- Treatment can include:  Antibiotic liquids or gels that you put on the " affected skin  Antibiotic pills, which you might need to take for a few months or longer  Injections of steroid medicines into affected areas to bring down inflammation  Hormone pills for some women with HS  A medicine called adalimumab (brand name: Humira)  Minor surgery  There are other medicines and treatments that might help people with HS. People with severe, long-lasting problems can have surgery that helps HS to heal.   Is there anything I can do on my own to feel better? -- Yes. First, you should know that you did not do anything to cause your condition. It is not your fault. You did not cause it by being unclean. You should also know that HS is not contagious, and you cannot spread it to other people.  Here are some things you can do to reduce your symptoms:  Stop smoking, if you smoke. People who smoke are more likely to have HS. If you want to try to quit smoking, your doctor or nurse can help.  Try to lose weight, if you are overweight. HS is more common and more severe in people who are overweight. If you want to lose weight, your doctor or nurse can help you come up with a plan to do this in a healthy way.  How can I learn more about hidradenitis suppurativa? -- More information is available online from the following organizations:  Hidradenitis Suppurativa Foundation (https://www.hs-foundation.org/)  Hidradenitis Suppurativa Trust (https://www.hstrust.org/)  These are also good resources if you are looking for support from other people living with HS. It can help to talk to people who are going through similar things.  All topics are updated as new evidence becomes available and our peer review process is complete.  This topic retrieved from [employer] on: Sep 21, 2021.  Topic 65959 Version 9.0  Release: 29.4.2 - C29.263  © 2021 UpToDate, Inc. and/or its affiliates. All rights reserved.  figure 1: Hidradenitis suppurativa     Hidradenitis suppurativa causes red, swollen, painful bumps to form on the  body (panel A). It usually affects areas where the skin rubs together, like the armpit. Over time, the skin hardens and scars around the bumps. Sometimes, tunnels form under the skin (panel B).  Graphic 552592 Version 1.0     Consumer Information Use and Disclaimer   This information is not specific medical advice and does not replace information you receive from your health care provider. This is only a brief summary of general information. It does NOT include all information about conditions, illnesses, injuries, tests, procedures, treatments, therapies, discharge instructions or life-style choices that may apply to you. You must talk with your health care provider for complete information about your health and treatment options. This information should not be used to decide whether or not to accept your health care provider's advice, instructions or recommendations. Only your health care provider has the knowledge and training to provide advice that is right for you. The use of this information is governed by the [employer] End User License Agreement, available at https://www.[employer]/en/solutions/[employer]/about/cyndy.The use of [employer] content is governed by the [employer] Terms of Use. ©2021 UpToDate, Inc. All rights reserved.  Copyright   © 2021 Up[employer]Date, Inc. and/or its affiliates. All rights reserved.

## 2022-11-30 NOTE — PROGRESS NOTES
"Infectious Diseases Clinic Note    Subjective:       Patient ID: Nancy Sun is a 52 y.o. female     Chief Complaint: 3wk fu Lymphedema of upper extremity, bilateral         Patient is a 51-year-old female with past medical history of hypertension, morbid obesity, insomnia, hypergammaglobulinemia, gastroesophageal reflux disease without esophagitis, chronic idiopathic constipation      LUE lymphedema started around 3 years ago after having 3 surgeries for HS.   RUE started after having a heart cath recently. She does admit to bilateral axillary rashes and rash under her left breast.   She states "antibiotics make the problem worse".  She has seen lymphedema specialists and was told to wear compression sleeves. This does help some. She has a port for blood draw. She denies fever, chills, lymphadenopathy. Denies neck pain or nuchal rigidity.     She denies having Cats and/or dogs in the home. No recent travel. No tick bites or exposure. No recent blood transfusion or transplant. Denies IVDU. Denies high risk behaviors.      HERE TODAY FOR FOLLOW UP. HPI UNCHANGED FROM ABOVE. SHE REPORTS FINISHING FLUCONAZOLE. SHE TELLS ME THAT "IT FLARED UP MY LYMPHEDEMA". NO IMPROVEMENT NOTED. SHE REPORTS NO ACUTE ISSUES AT THIS TIME.             Pathology: 8/14/21     PERIPHERAL SMEAR REVIEW, BONE MARROW ASPIRATION, BONE MARROW BIOPSY AND FLOW   CYTOMETRIC EVALUATION:      1.  HISTORY OF MGUS.      2.  16% PLASMA CELLS IDENTIFIED, POLYCLONAL BY BOTH IMMUNOHISTOCHEMICAL   STUDIES AND FLOW       CYTOMETRIC EVALUATION.      3.  ANEMIA, NORMOCHROMIC AND NORMOCYTIC.      4.  IRON 0 OF 6+.      5.  NO EVIDENCE IS SEEN OR ANY MORPHOLOGIC ABNORMALITY NOTED IN THIS   MARROW EXAMINATION.      6.  SEE BELOW.   Comment:  The patient present with a history of monoclonal gammopathy.   There is an increase in plasma cells to approximately 16% by a 1,000 cell   count on blocks 1A, the biopsy, and 1B, the clot section.  However, kappa   and " lambda light chain staining on these reveal polyclonal plasma cells as   does the flow cytometric evaluation (see below).  Thus there is a   plasmacytosis which most likely is reactive in nature.  The MGUS may be   exactly that in that no definitive plasma cell dyscrasia can be identified   in any study.  There is an anemia which is normochromic and normocytic in   nature.  However iron is absent on iron stains.  Therefore iron deficiency   may have some contribution to the anemia     CYTOGENETICS REPORT:   KARYOTYPE:   -46,XX [20]   -NORMAL FEMALE KARYOTYPE   INTERPRETATION: Cytogenetic analysis of the bone marrow aspirate reveals an   NORMAL female karyotype.         Past Medical History:   Diagnosis Date    Abdominal pain     Arthritis     Constipation     COPD (chronic obstructive pulmonary disease)     Generalized headaches     GERD (gastroesophageal reflux disease)     Heart murmur     Hypertension     Sleep disorder     Smoldering myeloma 1/19/2022       Social History     Socioeconomic History    Marital status: Single   Occupational History    Occupation: none   Tobacco Use    Smoking status: Never    Smokeless tobacco: Never   Substance and Sexual Activity    Alcohol use: No    Drug use: No    Sexual activity: Never         Current Outpatient Medications:     apixaban (ELIQUIS) 5 mg Tab, Take 1 tablet (5 mg total) by mouth 2 (two) times daily., Disp: 60 tablet, Rfl: 2    carvediloL (COREG) 25 MG tablet, Take 1 tablet (25 mg total) by mouth 2 (two) times daily with meals., Disp: 60 tablet, Rfl: 3    cyclobenzaprine (FLEXERIL) 10 MG tablet, TAKE ONE TABLET BY MOUTH THREE TIMES DAILY AS NEEDED FPOR SPASMS, Disp: , Rfl:     FARXIGA 5 mg Tab tablet, Take 5 mg by mouth once daily., Disp: , Rfl:     fluconazole (DIFLUCAN) 100 MG tablet, Take 1 tablet (100 mg total) by mouth once daily., Disp: 7 tablet, Rfl: 0    metoclopramide HCl (REGLAN) 10 MG tablet, Take 30 mg by mouth 2 (two) times daily., Disp: , Rfl:      nystatin (MYCOSTATIN) powder, Apply topically 2 (two) times daily., Disp: 60 g, Rfl: 11    omeprazole (PRILOSEC) 20 MG capsule, Take 20 mg by mouth once daily., Disp: , Rfl:     ONETOUCH ULTRA TEST Strp, USE TEST STRIP WITH GLUCOSE METER TO TEST BLOOD SUGAR TWICE DAILY AS NEEDED., Disp: , Rfl:     ONETOUCH ULTRASOFT LANCETS lancets, USE ONE LANCET TO TEST BLOOD GLUCOSE TWICE DAILY., Disp: , Rfl:     pantoprazole (PROTONIX) 40 MG tablet, Take 1 tablet (40 mg total) by mouth once daily., Disp: 30 tablet, Rfl: 11    spironolactone (ALDACTONE) 50 MG tablet, Take 1 tablet (50 mg total) by mouth 2 (two) times daily., Disp: 60 tablet, Rfl: 11    zolpidem (AMBIEN) 5 MG Tab, Take 1 tablet (5 mg total) by mouth nightly as needed., Disp: 30 tablet, Rfl: 0    Review of Systems   Constitutional:  Negative for activity change, appetite change, chills, diaphoresis, fatigue and unexpected weight change.   HENT:  Negative for congestion, facial swelling, hearing loss, mouth sores, trouble swallowing and voice change.    Eyes:  Negative for photophobia, pain, discharge and itching.   Respiratory:  Negative for apnea, cough, choking, chest tightness and shortness of breath.    Cardiovascular:  Negative for chest pain, palpitations and leg swelling.   Gastrointestinal:  Negative for abdominal distention, abdominal pain, anal bleeding and blood in stool.   Endocrine: Negative for cold intolerance, heat intolerance, polydipsia and polyphagia.   Genitourinary:  Negative for difficulty urinating, dysuria, flank pain and hematuria.   Musculoskeletal:  Positive for back pain. Negative for arthralgias, joint swelling, myalgias, neck pain and neck stiffness.   Skin:  Positive for rash. Negative for color change, pallor and wound.   Allergic/Immunologic: Negative for environmental allergies, food allergies and immunocompromised state.   Neurological:  Negative for dizziness, tremors, seizures, facial asymmetry, speech difficulty, weakness,  light-headedness, numbness and headaches.   Hematological:  Negative for adenopathy. Does not bruise/bleed easily.   Psychiatric/Behavioral:  Negative for agitation, behavioral problems, confusion, decreased concentration and sleep disturbance.          Objective:      Vitals:    11/30/22 1321   BP: 121/70   Pulse: 82     Physical Exam  Vitals and nursing note reviewed.   Constitutional:       General: She is not in acute distress.     Appearance: Normal appearance. She is not ill-appearing.   HENT:      Head: Normocephalic and atraumatic.      Nose: No congestion or rhinorrhea.   Eyes:      General: No scleral icterus.     Extraocular Movements: Extraocular movements intact.      Pupils: Pupils are equal, round, and reactive to light.   Cardiovascular:      Rate and Rhythm: Normal rate and regular rhythm.      Pulses: Normal pulses.      Heart sounds: Normal heart sounds. No murmur heard.    No gallop.   Pulmonary:      Effort: Pulmonary effort is normal. No respiratory distress.      Breath sounds: Normal breath sounds. No stridor. No wheezing or rhonchi.   Abdominal:      General: Bowel sounds are normal. There is no distension.      Palpations: There is no mass.      Tenderness: There is no abdominal tenderness. There is no guarding.   Musculoskeletal:         General: Tenderness present. No swelling, deformity or signs of injury. Normal range of motion.      Cervical back: Normal range of motion and neck supple. No rigidity. No muscular tenderness.      Right lower leg: No edema.      Left lower leg: No edema.      Comments: No upper extremity asymmetry noted. No erythema, warmth, streaking noted. NV intact distally.    Skin:     General: Skin is warm.      Coloration: Skin is not jaundiced or pale.      Findings: Rash present. No bruising or lesion.          Neurological:      General: No focal deficit present.      Mental Status: She is alert and oriented to person, place, and time.      Cranial Nerves: No  cranial nerve deficit.      Sensory: No sensory deficit.      Motor: No weakness.      Gait: Gait normal.   Psychiatric:         Mood and Affect: Mood normal.         Behavior: Behavior normal.         Thought Content: Thought content normal.           Assessment/Plan:       1. Lymphedema of upper extremity, bilateral    2. Candidal intertrigo    3. Polyclonal gammopathy    4. ESR raised    5. CRP elevated    6. CATHI positive    7. MGUS (monoclonal gammopathy of unknown significance)    8. Candida albicans infection    9. High total IgG    10. Hidradenitis suppurativa of left axilla    11. Encounter to discuss test results      Problem List Items Addressed This Visit          Derm    Hidradenitis suppurativa of left axilla    Current Assessment & Plan           RECOMMENDED [facility] DAILY AND DERMATOLOGY. SHE DENIED DERM REFERRAL.       SEE AVS FOR EDUCATION PROVIDED.             Immunology/Multi System    CATHI positive    Current Assessment & Plan       CATHI IGG positive    CATHI by Hep-2 negative     Anti-thyroglobulin IGG Equivocal     Lupus negative    RF/anti-ccp negative         PLAN    1. Recommend rechecking autoimmune thyroid labs via PCP. THE PATIENT WAS GIVEN A HARD COPY OF LABS WITH NOTATIONS OF WHAT LABS CAN BE REPEATED WITH PCP.             Oncology    MGUS (monoclonal gammopathy of unknown significance)    Current Assessment & Plan       Pathology: 8/14/21     PERIPHERAL SMEAR REVIEW, BONE MARROW ASPIRATION, BONE MARROW BIOPSY AND FLOW   CYTOMETRIC EVALUATION:      1.  HISTORY OF MGUS.      2.  16% PLASMA CELLS IDENTIFIED, POLYCLONAL BY BOTH IMMUNOHISTOCHEMICAL   STUDIES AND FLOW       CYTOMETRIC EVALUATION.      3.  ANEMIA, NORMOCHROMIC AND NORMOCYTIC.      4.  IRON 0 OF 6+.      5.  NO EVIDENCE IS SEEN OR ANY MORPHOLOGIC ABNORMALITY NOTED IN THIS   MARROW EXAMINATION.      6.  SEE BELOW.   Comment:  The patient present with a history of monoclonal gammopathy.   There is an increase in plasma cells  to approximately 16% by a 1,000 cell   count on blocks 1A, the biopsy, and 1B, the clot section.  However, kappa   and lambda light chain staining on these reveal polyclonal plasma cells as   does the flow cytometric evaluation (see below).  Thus there is a   plasmacytosis which most likely is reactive in nature.  The MGUS may be   exactly that in that no definitive plasma cell dyscrasia can be identified   in any study.  There is an anemia which is normochromic and normocytic in   nature.  However iron is absent on iron stains.  Therefore iron deficiency   may have some contribution to the anemia     CYTOGENETICS REPORT:   KARYOTYPE:   -46,XX [20]   -NORMAL FEMALE KARYOTYPE   INTERPRETATION: Cytogenetic analysis of the bone marrow aspirate reveals an   NORMAL female karyotype.             Other    Lymphedema of upper extremity, bilateral - Primary    Current Assessment & Plan         SMEAR = no diagnostic immunophenotypic abnormalities detected via flow cytometry.      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  Elevation in the beta region can be seen with hyperlipoproteinemia, and acute inflammation.     Gamma 1.72  Bet 1.33         IGG 1713       CRP 8.8    ESR 59     HIV non-reactive    RPR non-reactive       BLOOD CULTURE CC    REPORT CCMicrobiology results  Blood Source  VENIPUNCTURE  RESULT:  [11/11/2022 11:01 GISSEL Pringle]      No  Aerobic/Anaerobic Growth overnight  [11/12/2022 07:42 GISSEL Pringle]      No  Aerobic/Anaerobic Growth at 2 days  [11/16/2022 07:52 GISSEL Nix]      No  Aerobic/Anaerobic Growth at 5 Days  Infection Control Micro  [11/16/2022 07:52 GISSEL Nix] These  results do not meet the criteria for  submitting reports to the [facility]    BLOOD CULTURE SITE VENIPUNCTURE      Candida albicans Antibodies (IgG, IgA, IgM) EZ    ALBICANS IGG EZ1.1 H Index    ALBICANS IGA EZ1.9 H Index    ALBICANS IGM EZ0.4 Index     REFERENCE RANGE:  <1.0    "  INTERPRETIVE CRITERIA:            <1.0   Antibody Not Detected      > or = 1.0   Antibody Detected          QUANTIFERON(R)-TB GOLD PLUS, 1 TUBE RGA    QuantiFERON-TB Plus, NEGATIVE NEGATIVE  Negative test result. M. tuberculosis complex   infection unlikely.    NIL RGA0.01 IU/mL    Mitogen-NIL RGA>10.00 IU/mL    TB1-NIL RGA0.02 IU/mL  KAYLA ALVAREZ Page: 1 (Continued)      LYME DISEASE AB W/RF TO BLOT IGG/IGM IG    LYME ANTIBODY SCREEN IG<0.90 index                     Index                Interpretation                     -----                --------------                     < 0.90               Negative                     0.90-1.09            Equivocal                     > 1.09               Positive      Angiotensin Converting Enzyme IG    Angiotensin Enzyme 26 9-67 U/L        PLAN      Case discussed w/ Dr. Francis Katz.     The patient has numerous inflammatory/reactive markers. She was treated for candida albicans. This is intertriginous and not candidemia, so a 7 day fluconazole regimen is appropriate.          1. 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  Would like to get a biopsy to confirm this is what we are dealing with and she is not resistant to fluconazole. She deferred this.   2. She did agree to Dr. Leary doing the biopsy and get susceptibility testing.   3. I also recommend working w/ Lymphedema specialist and her PCP. This is not 2/t infectious disease.          Relevant Orders    Ambulatory referral/consult to General Surgery     Other Visit Diagnoses       Candidal intertrigo        Relevant Medications    nystatin (MYCOSTATIN) powder    Other Relevant Orders    Ambulatory referral/consult to General Surgery    Polyclonal gammopathy        reactive     ESR raised        reactive.     CRP elevated        Candida albicans infection        Recommend biopsy of axilla to confirm " "treatment w/ Fluconazole is correct regimen. Will prescribe nystatin powder as well. Refer to Dr. Leary for Bx.     Relevant Medications    nystatin (MYCOSTATIN) powder    Other Relevant Orders    Ambulatory referral/consult to General Surgery    High total IgG        REACTIVE     Encounter to discuss test results                     Hematology note =     == 10/7/22: walked into clinic today, due to c/o of left arm swelling since heart cath with Dr Quiroga back in September. Many ER visits showing neg arterial ultrasound and stable lab values. I was able to review her ER visit on 9/30/22 at [facility] and [place] [facility] Measurements of left arm 18 cm compared to 16.5 cm to right arm. She denies any trauma but reports pain began after heart catheterization.  Bilateral Pulses are present and nornal, no redness, warmth or hardness palpated.  Patient reports she has been seen by Dr. Quiroga as well as Dr. See for this issue and was told to follow-up with Oncology due to her "cancer".  I have discussed with her today that she does not have active cancer and that she has MGUS, we are currently not providing any treatment for her and are following her every 3 months with labs.  She was last seen September 13th with no evidence of crab criteria and was told she continues to have MGUS.  Patient became very upset that we were not "treating her and giving her the run around".  She left that day very angry and said she was not coming back.  Patient again today reports that we are "giving her the run around" by advising her to follow-up with her PCP regarding complaints of left arm pain which shows no evidence of blood clot.  She states today that she will not be coming back to this clinic as this is a "waste of her time".       10/31/22 12:10:   Red Blood Count 4.25, Mean Corpuscular Volume 91.1, Mean Corpuscular Hemoglobin 29.4, Mean Corpuscular Hemoglobin Concent 32.3, Red Cell Distribution Width " 13.5, Neutrophils (%) (Auto) 51.0, Lymphocytes (%) (Auto) 35.7, Monocytes (%) (Auto) 9.5, Eosinophils (%) (Auto) 2.6, Basophils (%) (Auto) 0.8, Neutrophils # (Auto) 3.78, Nucleated Red Blood Cells % 0.0, Urine Source Cl Catch [place], Urine Color Maria Del Rosario, Urine Appearance Clear, Urine pH 6.0, Urine Specific Gravity 1.015, Urine Protein Negative, Urine Glucose (UA) 1000, Urine Ketones Negative, Urine Occult Blood 10, Urine Nitrite Negative, Urine Bilirubin Negative, Urine Urobilinogen Normal, Urine Leukocyte Esterase Negative, Urine RBC 0-2, Urine WBC 0-2, Urine Squamous Epithelial Cells 1+ Few, Urine Bacteria +/- Rare, Urine Yeast 1+ Few, Urine Culture Indicated No, Criteria Not Met, Carbon Dioxide Level 33, Anion Gap 2.0, Estimat Glomerular Filtration Rate > 60, BUN/Creatinine Ratio 20.22, Calcium Level 8.8, Total Bilirubin 0.5, Aspartate Amino Transf (AST/SGOT) 20, Alanine Aminotransferase (ALT/SGPT) 36, Alkaline Phosphatase 129, Total Protein 8.5, Albumin 3.7, Globulin 4.8, Albumin/Globulin Ratio 0.770, Lipase 123      CMS MANDATED QUALITY DATA - CT RADIATION - 436    All CT scans at this facility use dose modulation, iterative-reconstruction, and/or weight-based dosing when appropriate to reduce radiation dose to as low as reasonably achievable.    CT Abdomen and Pelvis without contrast    HISTORY: Left lower quadrant abdominal pain    TECHNIQUE: Serial axial images were obtained through the abdomen and pelvis.  No intravenous contrast was administered prior to image acquisition. Coronal and sagittal reconstructions were performed. Patient received 11.2 mSv of radiation exposure from this exam.    COMPARISON: No previous study available for comparison.    FINDINGS:    Lung bases: Clear.    Liver: No focal hepatic lesions or biliary ductal dilatation.    Gallbladder: Removed.    Spleen: Normal size.    Pancreas: Unremarkable.    Adrenal glands: Unremarkable.    Kidneys: Kidneys are symmetric and there is no  hydronephrosis.    Stomach and bowel: Stomach and bowel are normal caliber. There is an anastomosis in the central abdomen to level the umbilicus which is unchanged. Scattered colonic diverticula but no evidence of diverticulitis.    Peritoneum/retroperitoneum: No free air or free fluid. No drainable fluid collections. No pathologically enlarged lymph nodes.    Pelvic structures: Unchanged.    Vessels: Unremarkable.    Bones and soft tissues: Surgical clips noted throughout the anterior abdomen. No acute abnormalities.      IMPRESSION:      No acute abnormalities in the abdomen or pelvis. There are scattered colonic diverticula but no evidence of diverticulitis.    DICTATING PHYSICIAN:  SUE CRABTREE MD                Date Dictated: 10/31/22 1323    Signed By:  SUE CRABTREE MD <Electronically signed by SUE CRABTREE MD in OV>                                        Date Signed:  10/31/22 1330    UPPER EXTREMITY W/O CONTRAST Right  CMS MANDATED QUALITY DATA CT RADIATION 436  *All CT scans at this facility uses dose modulation, iterative reconstruction  and/or weight-based dosing when appropriate to reduce radiation dose to as low as reasonably achievable.    Clinical data:  Right upper chest swelling    Technique:  Acquisition: Contiguous scan slices were obtained from the right shoulder to the right elbow  Reconstructions: Axial, coronal and sagittal reconstructions of the data set were obtained.  Contrast:  IV: No contrast was administered for the examination.  Other: None given.  Phases: Noncontrasted images.  Limitations: No technical limitations.  Estimated radiation dose: 0.4 mSv.    Comparison:  No prior relevant studies are available.    Findings:  The bony elements appear intact. No fractures are seen. No areas of bony destruction are noted.    The muscular structures appear unremarkable.    No masses or fluid collections are seen. No adenopathy is seen.    Impression:    1.  Negative exam.        This  document was created using a voice recognitio    No visits with results within 1 Month(s) from this visit.   Latest known visit with results is:   Orders Only on 09/02/2022   Component Date Value Ref Range Status    Sodium 09/06/2022 141  135 - 145 mmol/L Final    Potassium 09/06/2022 4.6  3.6 - 5.2 mmol/L Final    Chloride 09/06/2022 104  100 - 108 mmol/L Final    CO2 09/06/2022 28  21 - 32 mmol/L Final    Anion Gap 09/06/2022 9.0  3.0 - 11.0 mmol/L Final    BUN 09/06/2022 18  7 - 18 mg/dL Final    Creatinine 09/06/2022 0.85  0.55 - 1.02 mg/dL Final    GFR ESTIMATION 09/06/2022 > 60  >60 Final               DESCRIPTION       GLOMERULAR FILTRATION RATE             NORMAL                     >60             KIDNEY  DISEASE           15-60             KIDNEY FAILURE             <15    BUN/Creatinine Ratio 09/06/2022 21.17 (H)  7 - 18 Ratio Final    Glucose 09/06/2022 88  70 - 110 mg/dL Final    Calcium 09/06/2022 9.0  8.8 - 10.5 mg/dL Final    Total Bilirubin 09/06/2022 0.4  0.0 - 1.0 mg/dL Final    AST 09/06/2022 17  15 - 37 U/L Final    ALT 09/06/2022 21  12 - 78 U/L Final    Total Protein 09/06/2022 8.0  6.4 - 8.2 g/dL Final    Albumin 09/06/2022 3.6  3.4 - 5.0 g/dL Final    Globulin 09/06/2022 4.4 (H)  2.3 - 3.5 g/dL Final    Albumin/Globulin Ratio 09/06/2022 0.818 (L)  1.1 - 1.8 Ratio Final    Alkaline Phosphatase 09/06/2022 126 (H)  46 - 116 U/L Final    WBC 09/06/2022 7.7  4.6 - 10.2 10*3/uL Final    RBC 09/06/2022 4.16 (L)  4.2 - 5.4 10*6/uL Final    Hemoglobin 09/06/2022 12.0  12.0 - 16.0 g/dL Final    Hematocrit 09/06/2022 37.4  37.0 - 47.0 % Final    MCV 09/06/2022 89.9  80 - 97 fL Final    MCH 09/06/2022 28.8  27.0 - 31.2 pg Final    MCHC 09/06/2022 32.1  31.8 - 35.4 g/dL Final    RDW RBC Auto-Rto 09/06/2022 13.8  12.5 - 18.0 % Final    Platelets 09/06/2022 219  142 - 424 10*3/uL Final    Neutrophils 09/06/2022 57.0  37 - 80 % Final    Lymphocytes 09/06/2022 30.7  25 - 40 % Final    Monocytes 09/06/2022  8.8  1 - 15 % Final    Eosinophils 09/06/2022 2.5  1 - 3 % Final    Basophils 09/06/2022 0.5  0 - 3 % Final    nRBC# 09/06/2022 0.0  % Final    Neutrophils Absolute 09/06/2022 4.41  1.8 - 7.7 10*3/uL Final    Kappa Free Light Chains 09/07/2022 30.49 (A)  3.30 - 19.40 mg/L Final    INTERPRETIVE INFORMATION: Kappa Qnt Free Light ChainsUndetected antigen excess is a rare event but cannot beexcluded. Free light chain results should always be interpretedin conjunction with other clinical and laboratory findings.    Lambda Light Chain, Free, Serum 09/07/2022 24.30  5.71 - 26.30 mg/L Final    INTERPRETIVE INFORMATION: Lambda Qnt Free Light ChainsUndetected antigen excess is a rare event but cannot beexcluded. Free light chain results should always be interpretedin conjunction with other clinical and laboratory findings.    Kappa/Lambda FLC Ratio 09/07/2022 1.25  0.26 - 1.65 Final    Performed By: [facility] [place] [id number] Director: Koffi Hancock MD, PhD      No results found in the last 30 days.      Duration of encounter: 30 minutes  This includes face-to-face time and non face-to-face time preparing to see the patient (eg, review of tests), obtaining and/or reviewing separately obtained history, documenting clinical information in the electronic or other health record, independently interpreting resultsand communicating results to the patient/family/caregiver, or care coordination.      All diagnostic data (labs/imaging) was reviewed with the patient and/or family member in the room.  All questions were answered to their liking. The patient and/or family member voiced understanding of all instructions provided. Expectations regarding follow up and treatment plan were voiced and confirmed prior to departure. The patient was given orders/instructions at the end of the visit for reference. They were instructed to notify my office if they have not been contacted for  "imaging/referrals/labs/results in 1-2 weeks. They voiced understanding of all of the above.     Follow up:     Patient Instructions     Hidradenitis Suppurativa   The Basics   Written by the doctors and editors at [facility]   What is hidradenitis suppurativa? -- Hidradenitis suppurativa, or "HS," is a condition that causes swollen, painful bumps to form on the body. The bumps usually appear in places where the skin rubs together. They can cause so much pain that they make it hard to move. They can smell bad or drain pus or blood. The bumps also tend to last for weeks or months and keep coming back.  People who have HS often have a hard time dealing with their problem. It can make them feel embarrassed and worried. Sometimes the condition can even cause problems in relationships, or in the workplace. If you have this problem, see a doctor or nurse. There are treatments that can help you.  What are the symptoms of HS? -- The main symptoms are swollen, painful bumps. These bumps can drain pus or blood.  The bumps usually form in places where the skin rubs together. Common locations include:  Armpits  On or under the breasts  Groin area  Inner thighs  Buttocks  Around or near the anus  The skin problems caused by HS last a long time and get worse over time. Often the skin hardens and scars around the painful bumps. Many bumps can form in a single area and sometimes form tunnels under the skin (figure 1).  Should I see a doctor or nurse? -- Yes. If you have symptoms of HS, see a doctor or nurse. They can look at your skin and find out if HS is the cause of your symptoms. Make sure you tell the doctor or nurse if you feel sad, upset, or embarrassed because of your symptoms. They can help you deal with these problems.  It's also important to see your doctor regularly if you have HS. People with HS have a higher chance of getting other health problems, too, such as diabetes and heart disease. Your doctor can check for these " problems and suggest treatment if needed.  How is HS treated? -- Treatment can include:  Antibiotic liquids or gels that you put on the affected skin  Antibiotic pills, which you might need to take for a few months or longer  Injections of steroid medicines into affected areas to bring down inflammation  Hormone pills for some women with HS  A medicine called adalimumab (brand name: Humira)  Minor surgery  There are other medicines and treatments that might help people with HS. People with severe, long-lasting problems can have surgery that helps HS to heal.   Is there anything I can do on my own to feel better? -- Yes. First, you should know that you did not do anything to cause your condition. It is not your fault. You did not cause it by being unclean. You should also know that HS is not contagious, and you cannot spread it to other people.  Here are some things you can do to reduce your symptoms:  Stop smoking, if you smoke. People who smoke are more likely to have HS. If you want to try to quit smoking, your doctor or nurse can help.  Try to lose weight, if you are overweight. HS is more common and more severe in people who are overweight. If you want to lose weight, your doctor or nurse can help you come up with a plan to do this in a healthy way.  How can I learn more about hidradenitis suppurativa? -- More information is available online from the following organizations:  Hidradenitis Suppurativa Foundation (https://www.hs-foundation.org/)  Hidradenitis Suppurativa Trust (https://www.hstrust.org/)  These are also good resources if you are looking for support from other people living with HS. It can help to talk to people who are going through similar things.  All topics are updated as new evidence becomes available and our peer review process is complete.  This topic retrieved from [employer] on: Sep 21, 2021.  Topic 32068 Version 9.0  Release: 29.4.2 - C29.263  © 2021 UpToDate, Inc. and/or its affiliates. All  rights reserved.  figure 1: Hidradenitis suppurativa     Hidradenitis suppurativa causes red, swollen, painful bumps to form on the body (panel A). It usually affects areas where the skin rubs together, like the armpit. Over time, the skin hardens and scars around the bumps. Sometimes, tunnels form under the skin (panel B).  Graphic 152740 Version 1.0     Consumer Information Use and Disclaimer   This information is not specific medical advice and does not replace information you receive from your health care provider. This is only a brief summary of general information. It does NOT include all information about conditions, illnesses, injuries, tests, procedures, treatments, therapies, discharge instructions or life-style choices that may apply to you. You must talk with your health care provider for complete information about your health and treatment options. This information should not be used to decide whether or not to accept your health care provider's advice, instructions or recommendations. Only your health care provider has the knowledge and training to provide advice that is right for you. The use of this information is governed by the [employer] End User License Agreement, available at https://www.[employer]/en/solutions/DN2K/about/cyndy.The use of [employer] content is governed by the [employer] Terms of Use. ©2021 UpToDate, Inc. All rights reserved.  Copyright   © 2021 UpToDate, Inc. and/or its affiliates. All rights reserved.           Follow up if symptoms worsen or fail to improve.

## 2022-11-30 NOTE — ASSESSMENT & PLAN NOTE
RECOMMENDED [facility]OLOG[facility]. SHE DENIED DERM REFERRAL.       SEE AVS FOR EDUCATION PROVIDED.

## 2022-12-14 ENCOUNTER — OFFICE VISIT (OUTPATIENT)
Dept: SURGERY | Facility: CLINIC | Age: 52
End: 2022-12-14
Payer: MEDICARE

## 2022-12-14 ENCOUNTER — TELEPHONE (OUTPATIENT)
Dept: SURGERY | Facility: CLINIC | Age: 52
End: 2022-12-14

## 2022-12-14 VITALS — WEIGHT: 260.38 LBS | BODY MASS INDEX: 39.59 KG/M2

## 2022-12-14 DIAGNOSIS — B37.9 CANDIDA ALBICANS INFECTION: ICD-10-CM

## 2022-12-14 DIAGNOSIS — I89.0 LYMPHEDEMA OF UPPER EXTREMITY, BILATERAL: ICD-10-CM

## 2022-12-14 DIAGNOSIS — B37.2 CANDIDAL INTERTRIGO: ICD-10-CM

## 2022-12-14 PROCEDURE — 1160F PR REVIEW ALL MEDS BY PRESCRIBER/CLIN PHARMACIST DOCUMENTED: ICD-10-PCS | Mod: CPTII,S$GLB,, | Performed by: SURGERY

## 2022-12-14 PROCEDURE — 1160F RVW MEDS BY RX/DR IN RCRD: CPT | Mod: CPTII,S$GLB,, | Performed by: SURGERY

## 2022-12-14 PROCEDURE — 99204 PR OFFICE/OUTPT VISIT, NEW, LEVL IV, 45-59 MIN: ICD-10-PCS | Mod: S$GLB,,, | Performed by: SURGERY

## 2022-12-14 PROCEDURE — 99204 OFFICE O/P NEW MOD 45 MIN: CPT | Mod: S$GLB,,, | Performed by: SURGERY

## 2022-12-14 PROCEDURE — 3008F PR BODY MASS INDEX (BMI) DOCUMENTED: ICD-10-PCS | Mod: CPTII,S$GLB,, | Performed by: SURGERY

## 2022-12-14 PROCEDURE — 1159F MED LIST DOCD IN RCRD: CPT | Mod: CPTII,S$GLB,, | Performed by: SURGERY

## 2022-12-14 PROCEDURE — 1159F PR MEDICATION LIST DOCUMENTED IN MEDICAL RECORD: ICD-10-PCS | Mod: CPTII,S$GLB,, | Performed by: SURGERY

## 2022-12-14 PROCEDURE — 3008F BODY MASS INDEX DOCD: CPT | Mod: CPTII,S$GLB,, | Performed by: SURGERY

## 2022-12-14 NOTE — PROGRESS NOTES
Subjective:       Patient ID: Nancy Sun is a 52 y.o. female.    Chief Complaint: rash under left arm (Rash located under left arm. Swelling in both arms.)      52-year-old female with history of hidradenitis suppurativa of the axilla.  Patient had a local excision in the past by an outside surgeon.  Has developed a bulging area near the incision site that may be a recurrence.  Patient has been on antibiotics but has not had improvement.  He was sent here for surgical excision.    Review of Systems   Constitutional:  Negative for chills and fever.   HENT:  Negative for nasal congestion and rhinorrhea.    Eyes:  Negative for discharge and visual disturbance.   Respiratory:  Negative for shortness of breath and wheezing.    Cardiovascular:  Negative for chest pain and palpitations.   Gastrointestinal: Negative.  Negative for abdominal distention, abdominal pain, anal bleeding, blood in stool, constipation, diarrhea, nausea, rectal pain and vomiting.   Endocrine: Negative for cold intolerance and heat intolerance.   Genitourinary:  Negative for difficulty urinating and frequency.   Musculoskeletal:  Negative for back pain and myalgias.   Integumentary:  Negative for pallor and rash.   Neurological:  Negative for dizziness and headaches.   Hematological:  Negative for adenopathy. Does not bruise/bleed easily.   Psychiatric/Behavioral:  Negative for behavioral problems. The patient is not nervous/anxious.        Objective:      Physical Exam  Constitutional:       Appearance: Normal appearance. She is well-developed.   HENT:      Head: Normocephalic and atraumatic.   Eyes:      General: Lids are normal.      Conjunctiva/sclera: Conjunctivae normal.   Neck:      Trachea: Trachea normal.   Cardiovascular:      Rate and Rhythm: Normal rate and regular rhythm.      Heart sounds: Normal heart sounds.   Pulmonary:      Effort: Pulmonary effort is normal.      Breath sounds: Normal breath sounds.   Abdominal:       General: Bowel sounds are normal. There is no distension.      Palpations: Abdomen is soft.      Tenderness: There is no abdominal tenderness.      Hernia: No hernia is present.   Musculoskeletal:      Cervical back: Normal range of motion.   Skin:     General: Skin is warm and dry.             Comments: Well-healed incision left axilla, lateral aspect of the incision within the axilla has a 3 cm x 3 cm mass.  Nontender.   Neurological:      Mental Status: She is alert and oriented to person, place, and time.   Psychiatric:         Speech: Speech normal.         Behavior: Behavior normal. Behavior is cooperative.       Assessment:       Problem List Items Addressed This Visit          Other    Lymphedema of upper extremity, bilateral     Other Visit Diagnoses       Candidal intertrigo        Candida albicans infection        Recommend biopsy of axilla to confirm treatment w/ Fluconazole is correct regimen. Will prescribe nystatin powder as well. Refer to Dr. Leary for Bx.               Plan:       52-year-old female with a mass in the left axilla near the surgical site from previous excision hidradenitis suppurativa.  We will schedule the patient for localized excision of this skin mass.

## 2022-12-14 NOTE — TELEPHONE ENCOUNTER
----- Message from Aundrea Kohli sent at 12/14/2022  1:47 PM CST -----  Regarding: Cardia Clearance Request  Contact: Cele  Per phone call with Dr Ryan Quiroga office and speaking with Cele they are needing to fax over a Cardiac Clearance request and please fax's to [phone].      Thanks,  SJ

## 2022-12-14 NOTE — TELEPHONE ENCOUNTER
Cardiac clearance request faxed to Cele @ Dr. Quiroga's office fx [phone] along w/ today's OV notes. Fax successfully went through.

## 2022-12-15 DIAGNOSIS — B37.2 CANDIDAL INTERTRIGO: Primary | ICD-10-CM

## 2022-12-15 DIAGNOSIS — B37.9 CANDIDA ALBICANS INFECTION: ICD-10-CM

## 2022-12-15 DIAGNOSIS — I89.0 LYMPHEDEMA OF UPPER EXTREMITY, BILATERAL: ICD-10-CM

## 2022-12-16 ENCOUNTER — TELEPHONE (OUTPATIENT)
Dept: SURGERY | Facility: CLINIC | Age: 52
End: 2022-12-16
Payer: MEDICARE

## 2022-12-16 NOTE — TELEPHONE ENCOUNTER
Pt scheduled for sx (L axillary mesh excision) 12/12722. Orders, consents, and H&P faxed to  fx [phone] 12/16/22. Fax successfully went through. Pt given instructions during OV 12/14/22 and verbalized understanding. Pt is on eliquis and sees a cardiologist. Cardiac clearance faxed to Dr. Quiroga's office fx [phone] 12/14/22. Fax successfully went through.

## 2022-12-20 NOTE — TELEPHONE ENCOUNTER
Called to check on cardiac clearance. Was forwarded to Dr. Quiroga's nurse ENRIKE and LM for her to call back regarding this matter.

## 2022-12-22 NOTE — TELEPHONE ENCOUNTER
Received cardiac clearance from Dr. Quiroga's office. Pt to stop taking eliquis 2 days prior to sx and to restart asap afterwards. Pt called and notified. Pt verbalized understanding. Cardiac clearance faxed to  12/21/22 fx [phone]. Fax successfully went through.

## 2022-12-22 NOTE — TELEPHONE ENCOUNTER
[facility] requesting oncology clearance. Clearance request faxed to Dr. Crum's office fx [phone]. Fax successfully went through.

## 2022-12-27 ENCOUNTER — OUTSIDE PLACE OF SERVICE (OUTPATIENT)
Dept: SURGERY | Facility: CLINIC | Age: 52
End: 2022-12-27

## 2022-12-27 LAB
ABS NRBC COUNT: 0 THOU/UL (ref 0–0.01)
ABSOLUTE BASOPHIL: 0 10*3/UL (ref 0–0.3)
ABSOLUTE EOSINOPHIL: 0.2 10*3/UL (ref 0–0.6)
ABSOLUTE IMMATURE GRAN: 0.03 THOU/UL (ref 0–0.03)
ABSOLUTE LYMPHOCYTE: 2.2 10*3/UL (ref 1.2–4)
ABSOLUTE MONOCYTE: 0.7 10*3/UL (ref 0.1–0.8)
ALBUMIN SERPL BCP-MCNC: 3.4 G/DL (ref 3.4–5)
ALP SERPL-CCNC: 107 U/L (ref 45–117)
ALT SERPL W P-5'-P-CCNC: 20 U/L (ref 13–56)
ANION GAP SERPL CALC-SCNC: 4 MMOL/L (ref 3–11)
APTT PPP: 98.8 SEC (ref 25.7–36.7)
AST SERPL-CCNC: 12 U/L (ref 15–37)
BASOPHILS NFR BLD: 0.6 % (ref 0–3)
BILIRUB SERPL-MCNC: 0.5 MG/DL (ref 0.2–1)
BUN SERPL-MCNC: 18 MG/DL (ref 7–18)
BUN/CREAT SERPL: 20.93 RATIO
CALCIUM SERPL-MCNC: 8.9 MG/DL (ref 8.5–10.1)
CHLORIDE SERPL-SCNC: 104 MMOL/L (ref 98–107)
CO2 SERPL-SCNC: 31 MMOL/L (ref 21–32)
CREAT SERPL-MCNC: 0.86 MG/DL (ref 0.55–1.02)
EOSINOPHIL NFR BLD: 2.1 % (ref 0–6)
ERYTHROCYTE [DISTWIDTH] IN BLOOD BY AUTOMATED COUNT: 13.8 % (ref 0–15.5)
GFR ESTIMATION: > 60
GLUCOSE SERPL-MCNC: 93 MG/DL (ref 74–106)
HCT VFR BLD AUTO: 39.3 % (ref 37–47)
HGB BLD-MCNC: 12.3 G/DL (ref 12–16)
IMMATURE GRANULOCYTES: 0.4 % (ref 0–0.43)
INR PPP: 1 INR (ref 0.9–1.1)
LYMPHOCYTES NFR BLD: 32.1 % (ref 20–45)
MCH RBC QN AUTO: 28.6 PG (ref 27–32)
MCHC RBC AUTO-ENTMCNC: 31.3 % (ref 32–36)
MCV RBC AUTO: 91.4 FL (ref 80–99)
MONOCYTES NFR BLD: 9.9 % (ref 2–10)
NEUTROPHILS # BLD AUTO: 3.8 10*3/UL (ref 1.4–7)
NEUTROPHILS NFR BLD: 54.9 % (ref 50–80)
NUCLEATED RED BLOOD CELLS: 0 % (ref 0–0.2)
PLATELETS: 236 10*3/UL (ref 130–400)
PMV BLD AUTO: 9.6 FL (ref 9.2–12.2)
POTASSIUM SERPL-SCNC: 3.8 MMOL/L (ref 3.5–5.1)
PROT SERPL-MCNC: 7.7 G/DL (ref 6.4–8.2)
PROTHROMBIN TIME: 11.8 SEC (ref 10.2–12.9)
RBC # BLD AUTO: 4.3 10*6/UL (ref 4.2–5.4)
SODIUM BLD-SCNC: 139 MMOL/L (ref 131–143)
WBC # BLD: 7 10*3/UL (ref 4.5–10)

## 2022-12-27 PROCEDURE — 11450 PR EXC SWEAT GLAND LESN AXILL,SIMPL: ICD-10-PCS | Mod: LT,,, | Performed by: SURGERY

## 2022-12-27 PROCEDURE — 11450 EXC SKN HDRDNT AX SMPL/NTRM: CPT | Mod: LT,,, | Performed by: SURGERY

## 2022-12-28 ENCOUNTER — TELEPHONE (OUTPATIENT)
Dept: SURGERY | Facility: CLINIC | Age: 52
End: 2022-12-28
Payer: MEDICARE

## 2022-12-28 NOTE — TELEPHONE ENCOUNTER
----- Message from Kenisha Adkins sent at 12/28/2022  8:41 AM CST -----  Contact: self  Patient is calling in regards to would like medication called in for pain..Please call her at [phone]

## 2022-12-28 NOTE — TELEPHONE ENCOUNTER
Dr. Argueta's notified and wanted to know what pt can take since she is allergic to a lot of pain meds. Called pt back and she states she can only take demerol. MD notified and gave following orders: demerol 50mg 1 tab q6hrs prn severe pain #10 no refills. VORB. Pt notified and verbalized understanding. Pt to  prescription from office with photo ID.

## 2022-12-30 ENCOUNTER — TELEPHONE (OUTPATIENT)
Dept: SURGERY | Facility: CLINIC | Age: 52
End: 2022-12-30
Payer: MEDICARE

## 2022-12-30 LAB — SPECIMEN TO PATHOLOGY: NORMAL

## 2022-12-30 NOTE — TELEPHONE ENCOUNTER
----- Message from Margarita Romero sent at 12/30/2022 11:37 AM CST -----  Regarding: FW: Problems and Concerns  Contact: patient    ----- Message -----  From: Aundrea Jack  Sent: 12/30/2022   8:53 AM CST  To: [facility] [employer]  Subject: Problems and Concerns                            Per phone call with patient, she stated that she had surgery on 12/27/2022 and she stated that she has a water blister on the side of her left arm and she wanted to know if she should be concerned about this.  Please return call at [phone] (home).    Thanks,  SJ

## 2022-12-30 NOTE — TELEPHONE ENCOUNTER
Pt instructed to keep an eye on the area and to not bust the blister. Pt instructed that if area busts to keep clean, dry, and cover w/ bandage daily. Pt also instructed if she starts experiencing s/s of infection: running fever, redness, swelling, purulent drainage - to then go to ER to be evaluated. If not, then we would see pt at  on Wednesday. Pt verbalized understanding.

## 2023-01-04 ENCOUNTER — OFFICE VISIT (OUTPATIENT)
Dept: SURGERY | Facility: CLINIC | Age: 53
End: 2023-01-04
Payer: MEDICARE

## 2023-01-04 DIAGNOSIS — L73.2 HIDRADENITIS SUPPURATIVA OF LEFT AXILLA: Primary | ICD-10-CM

## 2023-01-04 PROCEDURE — 99024 POSTOP FOLLOW-UP VISIT: CPT | Mod: S$GLB,,, | Performed by: SURGERY

## 2023-01-04 PROCEDURE — 1159F PR MEDICATION LIST DOCUMENTED IN MEDICAL RECORD: ICD-10-PCS | Mod: CPTII,S$GLB,, | Performed by: SURGERY

## 2023-01-04 PROCEDURE — 99024 PR POST-OP FOLLOW-UP VISIT: ICD-10-PCS | Mod: S$GLB,,, | Performed by: SURGERY

## 2023-01-04 PROCEDURE — 1159F MED LIST DOCD IN RCRD: CPT | Mod: CPTII,S$GLB,, | Performed by: SURGERY

## 2023-01-04 PROCEDURE — 1160F PR REVIEW ALL MEDS BY PRESCRIBER/CLIN PHARMACIST DOCUMENTED: ICD-10-PCS | Mod: CPTII,S$GLB,, | Performed by: SURGERY

## 2023-01-04 PROCEDURE — 1160F RVW MEDS BY RX/DR IN RCRD: CPT | Mod: CPTII,S$GLB,, | Performed by: SURGERY

## 2023-01-04 RX ORDER — ORAL SEMAGLUTIDE 3 MG/1
TABLET ORAL
COMMUNITY
Start: 2022-11-28

## 2023-01-04 NOTE — PROGRESS NOTES
Patient had some dehiscence of wound, went to the emergency room to be evaluated and was placed on antibiotics.  States that it is draining clear fluid and is a little tender.  Not having any fevers or chills    Medial of the axillary incision has dehisced at skin level, underlying tissue was granulating in well no signs of infection or malodor    Continue to keep the wound covered and follow up my office in 3 weeks for a wound check

## 2023-01-15 DIAGNOSIS — I10 ESSENTIAL HYPERTENSION: ICD-10-CM

## 2023-01-15 RX ORDER — CARVEDILOL 25 MG/1
TABLET ORAL
Qty: 60 TABLET | Refills: 0 | OUTPATIENT
Start: 2023-01-15

## 2023-01-18 ENCOUNTER — OFFICE VISIT (OUTPATIENT)
Dept: SURGERY | Facility: CLINIC | Age: 53
End: 2023-01-18
Payer: MEDICARE

## 2023-01-18 DIAGNOSIS — L73.2 HIDRADENITIS SUPPURATIVA OF LEFT AXILLA: Primary | ICD-10-CM

## 2023-01-18 DIAGNOSIS — I10 ESSENTIAL HYPERTENSION: ICD-10-CM

## 2023-01-18 PROCEDURE — 99024 PR POST-OP FOLLOW-UP VISIT: ICD-10-PCS | Mod: S$GLB,,, | Performed by: SURGERY

## 2023-01-18 PROCEDURE — 99024 POSTOP FOLLOW-UP VISIT: CPT | Mod: S$GLB,,, | Performed by: SURGERY

## 2023-01-18 PROCEDURE — 1160F RVW MEDS BY RX/DR IN RCRD: CPT | Mod: CPTII,S$GLB,, | Performed by: SURGERY

## 2023-01-18 PROCEDURE — 1159F MED LIST DOCD IN RCRD: CPT | Mod: CPTII,S$GLB,, | Performed by: SURGERY

## 2023-01-18 PROCEDURE — 1159F PR MEDICATION LIST DOCUMENTED IN MEDICAL RECORD: ICD-10-PCS | Mod: CPTII,S$GLB,, | Performed by: SURGERY

## 2023-01-18 PROCEDURE — 1160F PR REVIEW ALL MEDS BY PRESCRIBER/CLIN PHARMACIST DOCUMENTED: ICD-10-PCS | Mod: CPTII,S$GLB,, | Performed by: SURGERY

## 2023-01-18 RX ORDER — CARVEDILOL 25 MG/1
TABLET ORAL
Qty: 60 TABLET | Refills: 0 | OUTPATIENT
Start: 2023-01-18

## 2023-01-18 NOTE — PROGRESS NOTES
Patient here for wound check, complaining of malodor coming from the wound but states that it is healing well and is no longer draining.      Axillary wound almost completely closed, a 3 mm x 1 mm area is open and only needs to be epithelialized, no surrounding erythema, induration, or drainage noted, no malodor noted    I feel the wound is healing quite nicely, instructed patient to let the wound continued to heal and to follow up my office in 2 weeks for re-evaluation, will not add any antibiotics at this time.

## 2023-06-09 ENCOUNTER — OFFICE VISIT (OUTPATIENT)
Dept: URGENT CARE | Facility: CLINIC | Age: 53
End: 2023-06-09
Payer: MEDICARE

## 2023-06-09 VITALS
WEIGHT: 260 LBS | HEIGHT: 68 IN | DIASTOLIC BLOOD PRESSURE: 78 MMHG | RESPIRATION RATE: 17 BRPM | OXYGEN SATURATION: 98 % | BODY MASS INDEX: 39.4 KG/M2 | HEART RATE: 74 BPM | SYSTOLIC BLOOD PRESSURE: 117 MMHG | TEMPERATURE: 99 F

## 2023-06-09 DIAGNOSIS — M54.9 ACUTE UPPER BACK PAIN: ICD-10-CM

## 2023-06-09 DIAGNOSIS — R07.89 CHEST DISCOMFORT: Primary | ICD-10-CM

## 2023-06-09 DIAGNOSIS — S29.012A UPPER BACK STRAIN, INITIAL ENCOUNTER: ICD-10-CM

## 2023-06-09 PROCEDURE — 99204 PR OFFICE/OUTPT VISIT, NEW, LEVL IV, 45-59 MIN: ICD-10-PCS | Mod: S$GLB,,, | Performed by: NURSE PRACTITIONER

## 2023-06-09 PROCEDURE — 99204 OFFICE O/P NEW MOD 45 MIN: CPT | Mod: S$GLB,,, | Performed by: NURSE PRACTITIONER

## 2023-06-09 RX ORDER — MELOXICAM 15 MG/1
15 TABLET ORAL DAILY
Qty: 5 TABLET | Refills: 0 | Status: SHIPPED | OUTPATIENT
Start: 2023-06-09 | End: 2023-06-14

## 2023-06-09 NOTE — PATIENT INSTRUCTIONS
Please follow up with your primary care provider within 2-5 days if your signs and symptoms have not resolved or worsen.     If your condition worsens or fails to improve we recommend that you receive another evaluation at the emergency room immediately or contact your primary medical clinic to discuss your concerns.   You must understand that you have received an Urgent Care treatment only and that you may be released before all of your medical problems are known or treated. You, the patient, will arrange for follow up care as instructed.        When do I need to call the doctor?   Activate the emergency medical system right away if you have signs of a heart attack. Call 911 in the [place] or [place]. The sooner treatment begins, the better your chances for recovery. Call for emergency help right away if you have:  Signs of heart attack:  Chest pain  Trouble breathing  Fast heartbeat  Feeling dizzy  Call your doctor if:  The pain is not controlled or worsens  You have trouble breathing  You have fever, chills, or coughing up yellow-green mucus  You have problems swallowing  Your pain is due to stress and the stress cannot be relieved  You have a new chest pain that feels different from what you have had before

## 2023-06-09 NOTE — PROGRESS NOTES
"Subjective:      Patient ID: Nancy Sun is a 52 y.o. female.    Vitals:  height is 5' 8" (1.727 m) and weight is 117.9 kg (260 lb). Her tympanic temperature is 98.5 °F (36.9 °C). Her blood pressure is 117/78 and her pulse is 74. Her respiration is 17 and oxygen saturation is 98%.     Chief Complaint: Back Pain    Patient presents back pain radiation to chest with a 8 out of 10 pain that started this morning. Patient describes the pain as constant and similar to the feeling she had one year ago with blood clots  In the lungs. Patient is currently taking blood thinners since the clots     Sudden onset of  sharp left upper back pain which radiates to left anterior chest wall while seated on bus ~ 2 hours ago. Pain increases with inspiration. Denies fever, cough, dyspnea, wheezing, or palpitations. Denies recent injury or heavy lifting.   Of note, she has PmHx PE with unknown provoking factor and has been compliant with Eliquis . she is s/p bariatric surgery on 4/26 after which her Eliquis was resumed 1 week later but at a lower dose. States this pain resembles the pain she experienced when diagnosed with PE.    Back Pain  This is a new problem. The current episode started today. The problem occurs constantly. The problem is unchanged. The pain is present in the thoracic spine. The quality of the pain is described as stabbing. The pain does not radiate (chest). The pain is at a severity of 8/10. The pain is severe. The pain is Worse during the day. Associated symptoms include chest pain. Pertinent negatives include no abdominal pain, bladder incontinence, bowel incontinence, dysuria, fever, headaches, leg pain, numbness, paresis, paresthesias, pelvic pain, perianal numbness, tingling, weakness or weight loss. She has tried nothing for the symptoms. The treatment provided no relief.     Constitution: Negative for activity change, appetite change, chills, sweating, fatigue and fever.   HENT:  Negative for " nosebleeds.    Cardiovascular:  Positive for chest trauma and chest pain. Negative for leg swelling, palpitations, sob on exertion and passing out.   Respiratory:  Negative for chest tightness, cough, sputum production, bloody sputum, COPD, shortness of breath, stridor, wheezing and asthma.    Gastrointestinal:  Negative for abdominal pain, nausea, vomiting and bowel incontinence.   Genitourinary:  Negative for dysuria, bladder incontinence and pelvic pain.   Musculoskeletal:  Positive for back pain. Negative for trauma, muscle cramps and muscle ache.   Skin:  Negative for color change, pale and rash.   Allergic/Immunologic: Negative for asthma.   Neurological:  Negative for dizziness, light-headedness, passing out, headaches, disorientation, altered mental status and numbness.   Hematologic/Lymphatic: Positive for history of blood clots. Negative for history of bleeding disorder.   Psychiatric/Behavioral:  Negative for altered mental status, disorientation, confusion and agitation.     Objective:     Physical Exam   Constitutional: She is oriented to person, place, and time.  Non-toxic appearance. She does not appear ill. No distress. normal  HENT:   Head: Normocephalic and atraumatic.   Ears:   Right Ear: External ear normal.   Left Ear: External ear normal.   Nose: Nose normal.   Mouth/Throat: Mucous membranes are moist. Oropharynx is clear.   Eyes: Pupils are equal, round, and reactive to light. Extraocular movement intact   Cardiovascular: Normal rate, regular rhythm, normal heart sounds and normal pulses.   Pulmonary/Chest: Effort normal and breath sounds normal. No stridor. No respiratory distress. She has no wheezes. She has no rhonchi. She has no rales. She exhibits tenderness.         Comments: Left chest wall pain reproduced with palpation- pt confirms the character of reproducible pain is consistent with the pain described in her chief complaint    Abdominal: Normal appearance.   Musculoskeletal: Normal  range of motion.         General: Normal range of motion.      Comments: Left upper back/scapular region tenderness on palpation-pt confirms the character of reproducible pain is consistent with the pain described in her chief complaint     Neurological: no focal deficit. She is alert, oriented to person, place, and time and at baseline.   Skin: Skin is warm, dry and not diaphoretic.   Psychiatric: Her behavior is normal. Mood, judgment and thought content normal.   Nursing note and vitals reviewed.    Assessment:     1. Chest discomfort    2. Upper back strain, initial encounter    3. Acute upper back pain        Plan:   XR CHEST PA AND LATERAL    Result Date: 6/9/2023  EXAMINATION: XR CHEST PA AND LATERAL CLINICAL HISTORY: Other chest pain TECHNIQUE: PA and lateral views of the chest were performed. COMPARISON: 09/25/2020 FINDINGS: Left-sided port catheter in place.The lungs are clear, with normal appearance of pulmonary vasculature and no pleural effusion or pneumothorax. The cardiac silhouette is normal in size. The hilar and mediastinal contours are unremarkable. Bones are intact.     No acute abnormality. Electronically signed by: Arnold Hart MD Date:    06/09/2023 Time:    13:50     EKG: normal EKG, normal sinus rhythm rate of 63 BPM.        Chest discomfort  -     XR CHEST PA AND LATERAL; Future; Expected date: 06/09/2023  -     meloxicam (MOBIC) 15 MG tablet; Take 1 tablet (15 mg total) by mouth once daily. for 5 days  Dispense: 5 tablet; Refill: 0    Upper back strain, initial encounter  -     meloxicam (MOBIC) 15 MG tablet; Take 1 tablet (15 mg total) by mouth once daily. for 5 days  Dispense: 5 tablet; Refill: 0    Acute upper back pain  -     meloxicam (MOBIC) 15 MG tablet; Take 1 tablet (15 mg total) by mouth once daily. for 5 days  Dispense: 5 tablet; Refill: 0          Medical Decision Making:   Differential Diagnosis:   Differential Diagnosis includes, but is not limited  to:  pericarditis/pneumothorax/hemothaox/stemi/ischemic changes/effusions/Pulmonary Embolism/pneumonia/costochondritis/musculoskeletal strain.  Independently Interpreted Test(s):   I have ordered and independently interpreted EKG Reading(s) - see summary below       <> Summary of EKG Reading(s): NSR, rate of 63 BPM, normal EKG, no prior tracings for comparison available. No ST changes.  Clinical Tests:   Radiological Study: Ordered  Medical Tests: Ordered  Urgent Care Management:  Will proceed with symptomatic treatment for musculoskeletal back/chest pain with use of muscle relaxants, anti-inflammatories, heat compresses, and gentle massage. Discussed treatment options with patient. Patient expressed verbal understanding and agreement with treatment plan.  Strict ER precautions were discussed with pt who verbalizes understanding of need for emergency evaluation if she develops dyspnea, worsening chest/upper back pain, tachycardia, palpitations, fever, etc.      Other:   I have discussed this case with another health care provider.       <> Summary of the Discussion: Discussed case with Dr KARIE Roe who is in agreement with the plan of care as follows:    Per Dr Roe: If you have reproducible msk back pain, in an anticoagulated patient, with no tachycardia, no tachypenia, I would treat the msk back pain. normal ekg, normal cxr ruling out pericarditis/pneumothorax/hemothaox/stemi/ischemic changes/effusions and a hpi which is very different than prior PE im assuming, I think you are ok with precautions. Additional MDM:     Heart Failure Score:   COPD = No    Patient Instructions   Please follow up with your primary care provider within 2-5 days if your signs and symptoms have not resolved or worsen.     If your condition worsens or fails to improve we recommend that you receive another evaluation at the emergency room immediately or contact your primary medical clinic to discuss your concerns.   You must understand  that you have received an Urgent Care treatment only and that you may be released before all of your medical problems are known or treated. You, the patient, will arrange for follow up care as instructed.        When do I need to call the doctor?   Activate the emergency medical system right away if you have signs of a heart attack. Call 911 in the [place] or [place]. The sooner treatment begins, the better your chances for recovery. Call for emergency help right away if you have:  Signs of heart attack:  Chest pain  Trouble breathing  Fast heartbeat  Feeling dizzy  Call your doctor if:  The pain is not controlled or worsens  You have trouble breathing  You have fever, chills, or coughing up yellow-green mucus  You have problems swallowing  Your pain is due to stress and the stress cannot be relieved  You have a new chest pain that feels different from what you have had before

## 2023-06-14 ENCOUNTER — TELEPHONE (OUTPATIENT)
Dept: URGENT CARE | Facility: CLINIC | Age: 53
End: 2023-06-14
Payer: MEDICARE

## 2023-06-14 NOTE — TELEPHONE ENCOUNTER
Incoming call from patient returning a missed call. Patient states her symptoms have improved slightly, however, still present. Reports compliance with taking medications prescribed to her at her visit and doesn't feel that any additional follow up/evaluation is necessary. No questions, comments, concerns.

## 2023-06-29 ENCOUNTER — OFFICE VISIT (OUTPATIENT)
Dept: URGENT CARE | Facility: CLINIC | Age: 53
End: 2023-06-29
Payer: MEDICARE

## 2023-06-29 VITALS
TEMPERATURE: 98 F | HEIGHT: 68 IN | RESPIRATION RATE: 16 BRPM | HEART RATE: 63 BPM | WEIGHT: 193 LBS | BODY MASS INDEX: 29.25 KG/M2 | DIASTOLIC BLOOD PRESSURE: 69 MMHG | SYSTOLIC BLOOD PRESSURE: 107 MMHG | OXYGEN SATURATION: 96 %

## 2023-06-29 DIAGNOSIS — S80.211A ABRASION OF RIGHT KNEE, INITIAL ENCOUNTER: ICD-10-CM
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
## 2023-06-29 DIAGNOSIS — S89.91XA INJURY OF RIGHT KNEE, INITIAL ENCOUNTER: Primary | ICD-10-CM

## 2023-06-29 PROCEDURE — 99214 OFFICE O/P EST MOD 30 MIN: CPT | Mod: S$GLB,,, | Performed by: PHYSICIAN ASSISTANT

## 2023-06-29 PROCEDURE — 99214 PR OFFICE/OUTPT VISIT, EST, LEVL IV, 30-39 MIN: ICD-10-PCS | Mod: S$GLB,,, | Performed by: PHYSICIAN ASSISTANT
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
## 2023-06-29 NOTE — PATIENT INSTRUCTIONS
-Wear knee brace as needed for support.   -Elevate extremity, apply ice, and take ibuprofen or tylenol as needed for pain.  -Clean wound with antibacterial soap daily. Keep area clean. Return for any signs of infection (surrounding redness, pain, swelling, pus drainage, fever).    Please follow up with your primary care provider within 2-5 days if your signs and symptoms have not resolved or worsen.     If your condition worsens or fails to improve we recommend that you receive another evaluation at the emergency room immediately or contact your primary medical clinic to discuss your concerns.   You must understand that you have received an Urgent Care treatment only and that you may be released before all of your medical problems are known or treated. You, the patient, will arrange for follow up care as instructed.

## 2023-07-06 ENCOUNTER — TELEPHONE (OUTPATIENT)
Dept: URGENT CARE | Facility: CLINIC | Age: 53
End: 2023-07-06
Payer: MEDICARE

## 2023-07-06 NOTE — TELEPHONE ENCOUNTER
Called placed to patient regarding her UC visit on 6/29. Patient reports significant improvement in her knee pain.

## 2023-08-24 ENCOUNTER — OFFICE VISIT (OUTPATIENT)
Dept: URGENT CARE | Facility: CLINIC | Age: 53
End: 2023-08-24
Payer: MEDICARE

## 2023-08-24 VITALS
OXYGEN SATURATION: 98 % | HEIGHT: 68 IN | TEMPERATURE: 98 F | HEART RATE: 49 BPM | SYSTOLIC BLOOD PRESSURE: 118 MMHG | DIASTOLIC BLOOD PRESSURE: 75 MMHG | RESPIRATION RATE: 16 BRPM | BODY MASS INDEX: 29.25 KG/M2 | WEIGHT: 193 LBS

## 2023-08-24 DIAGNOSIS — Z20.822 LAB TEST NEGATIVE FOR COVID-19 VIRUS: Primary | ICD-10-CM

## 2023-08-24 LAB
CTP QC/QA: YES
SARS-COV-2 AG RESP QL IA.RAPID: NEGATIVE

## 2023-08-24 PROCEDURE — 99213 PR OFFICE/OUTPT VISIT, EST, LEVL III, 20-29 MIN: ICD-10-PCS | Mod: S$GLB,,, | Performed by: PHYSICIAN ASSISTANT

## 2023-08-24 PROCEDURE — 99213 OFFICE O/P EST LOW 20 MIN: CPT | Mod: S$GLB,,, | Performed by: PHYSICIAN ASSISTANT

## 2023-08-24 PROCEDURE — 87811 SARS CORONAVIRUS 2 ANTIGEN POCT, MANUAL READ: ICD-10-PCS | Mod: QW,S$GLB,, | Performed by: PHYSICIAN ASSISTANT

## 2023-08-24 PROCEDURE — 87811 SARS-COV-2 COVID19 W/OPTIC: CPT | Mod: QW,S$GLB,, | Performed by: PHYSICIAN ASSISTANT

## 2023-08-24 NOTE — PROGRESS NOTES
"Subjective:      Patient ID: Nancy Sun is a 53 y.o. female.    Vitals:  height is 5' 8" (1.727 m) and weight is 87.5 kg (193 lb). Her temperature is 98.2 °F (36.8 °C). Her blood pressure is 118/75 and her pulse is 49 (abnormal). Her respiration is 16 and oxygen saturation is 98%.     Chief Complaint: Cough    Patient complains of a productive cough. She tested positive for covid on 8/14/2023. She is here to be retested for covid. Of note, pt states that her heart rate has been low requiring her to go to the ER yesterday. She followed up with cardiology this morning and is currently wearing a heart monitor. Pt's cardiologist is Dr. Quiroga.     Cough  This is a new problem. The current episode started 1 to 4 weeks ago. The problem has been gradually improving. Episode frequency: intermittent. The cough is Productive of sputum. Pertinent negatives include no chest pain, chills, ear pain, fever, headaches, myalgias, rash, sore throat or shortness of breath. The symptoms are aggravated by lying down. There is no history of asthma or COPD.       Constitution: Negative for chills, fatigue and fever.   HENT:  Negative for ear pain, sinus pain and sore throat.    Neck: Negative for neck pain, neck stiffness and painful lymph nodes.   Cardiovascular:  Negative for chest trauma, chest pain, leg swelling, palpitations, sob on exertion and passing out.   Eyes:  Negative for eye discharge, eye itching and eye pain.   Respiratory:  Positive for cough. Negative for sputum production and shortness of breath.    Gastrointestinal:  Negative for abdominal pain, nausea, vomiting and diarrhea.   Genitourinary:  Negative for dysuria, hematuria and pelvic pain.   Musculoskeletal:  Negative for pain, muscle cramps and muscle ache.   Skin:  Negative for pale, rash and wound.   Neurological:  Negative for dizziness, light-headedness and headaches.   Hematologic/Lymphatic: Negative for swollen lymph nodes.      Objective:     Physical " Exam   Constitutional: She is oriented to person, place, and time. She appears well-developed. She is cooperative.  Non-toxic appearance. She does not appear ill. No distress.   HENT:   Head: Normocephalic and atraumatic.   Ears:   Right Ear: External ear normal.   Left Ear: External ear normal.   Nose: Nose normal.   Mouth/Throat: Oropharynx is clear and moist.   Eyes: Conjunctivae, EOM and lids are normal. Right eye exhibits no discharge. Left eye exhibits no discharge. No scleral icterus.   Neck: Trachea normal and phonation normal. Neck supple.   Cardiovascular: Regular rhythm and normal heart sounds. Bradycardia present.   No murmur heard.Exam reveals no gallop.   Pulmonary/Chest: Effort normal and breath sounds normal. No respiratory distress. She has no decreased breath sounds. She has no wheezes. She has no rhonchi. She has no rales.   Musculoskeletal:         General: No deformity or edema.   Neurological: She is alert and oriented to person, place, and time. Coordination normal.   Skin: Skin is warm, dry, intact, not diaphoretic and not pale.   Psychiatric: Her speech is normal and behavior is normal. Judgment and thought content normal.   Nursing note and vitals reviewed.      Assessment:     1. Lab test negative for COVID-19 virus      Office Visit on 08/24/2023   Component Date Value Ref Range Status    SARS Coronavirus 2 Antigen 08/24/2023 Negative  Negative Final     Acceptable 08/24/2023 Yes   Final      Plan:       Lab test negative for COVID-19 virus  -     SARS Coronavirus 2 Antigen, POCT Manual Read          Medical Decision Making:   Urgent Care Management:  Negative covid test. Lung sounds are clear. Pt bradycardic at 49 bpm. She was seen yesterday in the ER for the same symptoms and was seen by cardiology today. Cardiology placed a cardiac monitor on patient. She has follow up scheduled with cardiology and denies symptoms at this time - only reports persistent cough after covid.  Return precautions discussed.

## 2023-09-06 ENCOUNTER — OFFICE VISIT (OUTPATIENT)
Dept: URGENT CARE | Facility: CLINIC | Age: 53
End: 2023-09-06
Payer: MEDICARE

## 2023-09-06 VITALS
BODY MASS INDEX: 25.76 KG/M2 | RESPIRATION RATE: 17 BRPM | TEMPERATURE: 98 F | DIASTOLIC BLOOD PRESSURE: 69 MMHG | SYSTOLIC BLOOD PRESSURE: 109 MMHG | WEIGHT: 170 LBS | HEIGHT: 68 IN | OXYGEN SATURATION: 98 % | HEART RATE: 60 BPM

## 2023-09-06 DIAGNOSIS — S01.21XA LACERATION OF NOSE, INITIAL ENCOUNTER: ICD-10-CM

## 2023-09-06 DIAGNOSIS — S09.92XA NASAL TRAUMA, INITIAL ENCOUNTER: Primary | ICD-10-CM

## 2023-09-06 PROCEDURE — 70160 X-RAY EXAM OF NASAL BONES: CPT | Mod: 26,,, | Performed by: RADIOLOGY

## 2023-09-06 PROCEDURE — 70160 X-RAY EXAM OF NASAL BONES: CPT | Mod: TC,,, | Performed by: STUDENT IN AN ORGANIZED HEALTH CARE EDUCATION/TRAINING PROGRAM

## 2023-09-06 PROCEDURE — 70160 XR NASAL BONES: ICD-10-PCS | Mod: TC,,, | Performed by: STUDENT IN AN ORGANIZED HEALTH CARE EDUCATION/TRAINING PROGRAM

## 2023-09-06 PROCEDURE — 70160 XR NASAL BONES: ICD-10-PCS | Mod: 26,,, | Performed by: RADIOLOGY

## 2023-09-06 PROCEDURE — 99213 PR OFFICE/OUTPT VISIT, EST, LEVL III, 20-29 MIN: ICD-10-PCS | Mod: 25,S$GLB,, | Performed by: STUDENT IN AN ORGANIZED HEALTH CARE EDUCATION/TRAINING PROGRAM

## 2023-09-06 PROCEDURE — 99213 OFFICE O/P EST LOW 20 MIN: CPT | Mod: 25,S$GLB,, | Performed by: STUDENT IN AN ORGANIZED HEALTH CARE EDUCATION/TRAINING PROGRAM
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
## 2023-09-06 NOTE — PATIENT INSTRUCTIONS
Please follow up with your primary care provider within 2-5 days if your signs and symptoms have not resolved or worsen.     If your condition worsens or fails to improve we recommend that you receive another evaluation at the emergency room immediately or contact your primary medical clinic to discuss your concerns.   You must understand that you have received an Urgent Care treatment only and that you may be released before all of your medical problems are known or treated. You, the patient, will arrange for follow up care as instructed.      Your X-ray show that you do not have a nasal fracture.  You cannot take medicines such as ibuprofen Advil or naproxen due to you being on Eliquis.  This medication increases your risk of bleeding. I would suggest that you take Tylenol for pain.  Please apply ice to your nose to help with the pain and swelling.  If you continue to have any pain or swelling then please return to the urgent care.   Please purchase neosporin and apply to nasal laceration once daily for 3 days.

## 2023-09-22 ENCOUNTER — TELEPHONE (OUTPATIENT)
Dept: HEMATOLOGY/ONCOLOGY | Facility: CLINIC | Age: 53
End: 2023-09-22
Payer: MEDICARE

## 2023-09-22 NOTE — TELEPHONE ENCOUNTER
"  Contacted patient to notify that due to her previous non-compliance and statements that she no longer wanted to follow up in clinic despite the recommendations for follow up labs evaluation every 3 months and provider visits every 6 months and her declining to proceed.   Patient also stated that coming to the clinic was a "waste of her time".      Patient got argumentative and hung up.    LO 9/22/23 @1:18pm    ----- Message from Roxann Hicks sent at 9/22/2023 12:20 PM CDT -----  Contact: pt  Pt calling for status of referral and she can be reached at [phone] Pt stated she has called several times.      Thanks,          "

## 2023-10-27 ENCOUNTER — OUTSIDE PLACE OF SERVICE (OUTPATIENT)
Dept: INTERVENTIONAL RADIOLOGY/VASCULAR | Facility: CLINIC | Age: 53
End: 2023-10-27
Payer: MEDICARE

## 2023-10-27 PROCEDURE — 74220 X-RAY XM ESOPHAGUS 1CNTRST: CPT | Mod: 26,,, | Performed by: STUDENT IN AN ORGANIZED HEALTH CARE EDUCATION/TRAINING PROGRAM

## 2023-10-27 PROCEDURE — 74220 PR  ESOPHAGRAM: ICD-10-PCS | Mod: 26,,, | Performed by: STUDENT IN AN ORGANIZED HEALTH CARE EDUCATION/TRAINING PROGRAM

## 2023-12-15 ENCOUNTER — OFFICE VISIT (OUTPATIENT)
Dept: URGENT CARE | Facility: CLINIC | Age: 53
End: 2023-12-15
Payer: MEDICARE

## 2023-12-15 VITALS
WEIGHT: 156 LBS | BODY MASS INDEX: 24.48 KG/M2 | HEART RATE: 55 BPM | HEIGHT: 67 IN | DIASTOLIC BLOOD PRESSURE: 67 MMHG | SYSTOLIC BLOOD PRESSURE: 114 MMHG | OXYGEN SATURATION: 99 % | TEMPERATURE: 98 F | RESPIRATION RATE: 16 BRPM

## 2023-12-15 DIAGNOSIS — J06.9 UPPER RESPIRATORY TRACT INFECTION, UNSPECIFIED TYPE: Primary | ICD-10-CM

## 2023-12-15 DIAGNOSIS — R05.9 COUGH, UNSPECIFIED TYPE: ICD-10-CM

## 2023-12-15 DIAGNOSIS — R09.81 NASAL CONGESTION: ICD-10-CM

## 2023-12-15 LAB
CTP QC/QA: YES
CTP QC/QA: YES
FLUAV AG NPH QL: NEGATIVE
FLUBV AG NPH QL: NEGATIVE
SARS-COV-2 AG RESP QL IA.RAPID: NEGATIVE

## 2023-12-15 PROCEDURE — 99213 OFFICE O/P EST LOW 20 MIN: CPT | Mod: S$GLB,,, | Performed by: STUDENT IN AN ORGANIZED HEALTH CARE EDUCATION/TRAINING PROGRAM

## 2023-12-15 PROCEDURE — 87811 SARS-COV-2 COVID19 W/OPTIC: CPT | Mod: QW,S$GLB,, | Performed by: STUDENT IN AN ORGANIZED HEALTH CARE EDUCATION/TRAINING PROGRAM

## 2023-12-15 PROCEDURE — 87804 POCT INFLUENZA A/B: ICD-10-PCS | Mod: 59,QW,, | Performed by: STUDENT IN AN ORGANIZED HEALTH CARE EDUCATION/TRAINING PROGRAM

## 2023-12-15 PROCEDURE — 99213 PR OFFICE/OUTPT VISIT, EST, LEVL III, 20-29 MIN: ICD-10-PCS | Mod: S$GLB,,, | Performed by: STUDENT IN AN ORGANIZED HEALTH CARE EDUCATION/TRAINING PROGRAM

## 2023-12-15 PROCEDURE — 87804 INFLUENZA ASSAY W/OPTIC: CPT | Mod: 59,QW,, | Performed by: STUDENT IN AN ORGANIZED HEALTH CARE EDUCATION/TRAINING PROGRAM

## 2023-12-15 PROCEDURE — 87811 SARS CORONAVIRUS 2 ANTIGEN POCT, MANUAL READ: ICD-10-PCS | Mod: QW,S$GLB,, | Performed by: STUDENT IN AN ORGANIZED HEALTH CARE EDUCATION/TRAINING PROGRAM

## 2023-12-15 RX ORDER — FLUTICASONE PROPIONATE 50 MCG
2 SPRAY, SUSPENSION (ML) NASAL DAILY
Qty: 9.9 ML | Refills: 0 | Status: SHIPPED | OUTPATIENT
Start: 2023-12-15

## 2023-12-15 RX ORDER — PSEUDOEPHEDRINE HCL 120 MG/1
120 TABLET, FILM COATED, EXTENDED RELEASE ORAL EVERY 12 HOURS PRN
Qty: 14 TABLET | Refills: 0 | Status: SHIPPED | OUTPATIENT
Start: 2023-12-15 | End: 2023-12-22

## 2023-12-15 NOTE — PROGRESS NOTES
"Subjective:      Patient ID: Nancy Sun is a 53 y.o. female.    Vitals:  height is 5' 7" (1.702 m) and weight is 70.8 kg (156 lb). Her oral temperature is 98.2 °F (36.8 °C). Her blood pressure is 114/67 and her pulse is 55 (abnormal). Her respiration is 16 and oxygen saturation is 99%.     Chief Complaint: Headache, Nasal Congestion, Fever, and Cough    Pt is here today complaining of headaches, nasal congestion, cough, and fever. Pt states her symptoms started yesterday. Pt was recently exposed to covid by her son who tested positive Monday. Pt has been taking OTC tylenol. Her temp was 101 yesterday.     Headache   This is a new problem. The current episode started yesterday. The problem occurs constantly. The problem has been unchanged. Pertinent negatives include no abdominal pain, blurred vision, coughing, fever, nausea, sore throat or vomiting.   Fever   This is a new problem. The current episode started yesterday. Associated symptoms include congestion. Pertinent negatives include no abdominal pain, chest pain, coughing, nausea, sore throat or vomiting.   Cough  This is a new problem. The current episode started yesterday. The problem has been unchanged. Pertinent negatives include no chest pain, fever, myalgias, sore throat or shortness of breath.       Constitution: Negative for fever.   HENT:  Positive for congestion. Negative for sore throat.    Cardiovascular:  Negative for chest pain.   Eyes:  Negative for blurred vision.   Respiratory:  Negative for cough, sputum production and shortness of breath.    Gastrointestinal:  Negative for abdominal pain, nausea and vomiting.   Musculoskeletal:  Negative for muscle ache.      Objective:     Physical Exam   HENT:   Head: Normocephalic and atraumatic.   Ears:   Right Ear: Tympanic membrane normal.   Left Ear: Tympanic membrane normal.   Nose: Congestion present. Right sinus exhibits maxillary sinus tenderness. Right sinus exhibits no frontal sinus " tenderness. Left sinus exhibits maxillary sinus tenderness. Left sinus exhibits no frontal sinus tenderness.   Mouth/Throat: Mucous membranes are moist. Oropharynx is clear.   Eyes: Conjunctivae are normal. Pupils are equal, round, and reactive to light.   Cardiovascular: Normal rate and regular rhythm.   Pulmonary/Chest: Effort normal and breath sounds normal.   Abdominal: Normal appearance.   Neurological: She is alert.   Psychiatric: Her behavior is normal. Mood normal.     Results for orders placed or performed in visit on 12/15/23   SARS Coronavirus 2 Antigen, POCT Manual Read   Result Value Ref Range    SARS Coronavirus 2 Antigen Negative Negative     Acceptable Yes    POCT Influenza A/B   Result Value Ref Range    Rapid Influenza A Ag Negative Negative    Rapid Influenza B Ag Negative Negative     Acceptable Yes       Assessment:     1. Upper respiratory tract infection, unspecified type    2. Cough, unspecified type    3. Nasal congestion        Plan:       Upper respiratory tract infection, unspecified type  -     fluticasone propionate (FLONASE) 50 mcg/actuation nasal spray; 2 sprays (100 mcg total) by Each Nostril route once daily.  Dispense: 9.9 mL; Refill: 0  -     pseudoephedrine (SUDAFED 12 HOUR) 120 mg 12 hr tablet; Take 1 tablet (120 mg total) by mouth every 12 (twelve) hours as needed for Congestion.  Dispense: 14 tablet; Refill: 0    Cough, unspecified type  -     SARS Coronavirus 2 Antigen, POCT Manual Read  -     POCT Influenza A/B    Nasal congestion      Please follow up with your primary care provider within 2-5 days if your signs and symptoms have not resolved or worsen.     If your condition worsens or fails to improve we recommend that you receive another evaluation at the emergency room immediately or contact your primary medical clinic to discuss your concerns.   You must understand that you have received an Urgent Care treatment only and that you may be  released before all of your medical problems are known or treated. You, the patient, will arrange for follow up care as instructed.        General Instructions for Upper Respiratory Infection (URI):     Alternate Tylenol and Ibuprofen every 3 hrs for fever, pain and inflammation.   Avoid NSAIDs (Ibuprofen, Aleve, Motrin, Aspirin) if you are pregnant, or have advanced kidney disease or history of stomach ulcers/bleeding.     Sore throat/Post Nasal Drip:  Salt water gargles, chloraseptic spray, lozenges, or cough drops   Honey/lemon water or warm tea   Cepachol   Zantac will help if there is reflux from the post nasal drip and helpful to take at night     Sinus Congestion/Runny nose:  Zyrtec/Claritin/Allegra during the day and Benadryl at night as needed  Mucinex, Dayquil, or Coricidin   If you DO NOT have Hypertension (high blood pressure) or any history of palpitations, it is ok to take over the counter Sudafed or Mucinex D or Allegra-D or Claritin-D or Zyrtec-D.  If you do take one of the above, it is ok to combine that with plain over the counter Mucinex or Allegra or Claritin or Zyrtec. If, for example, you are taking Zyrtec -D, you can combine that with Mucinex, but not Mucinex-D. If you are taking Mucinex-D, you can combine that with plain Allegra or Claritin or Zyrtec.  If you DO have Hypertension or palpitations, it is safe to take Coricidin HBP for relief of sinus symptoms.  Nasal saline spray reduces inflammation and dryness  Flonase OTC or Nasacort OTC to help decrease inflammation in nasal turbinates and allow sinuses to drain  Warm face compresses/hot showers as often as you can to open sinuses and allow to drain.   Vicks vapor rub and/or humidifier at night  Cold-eeze helps to reduce the duration of URI symptoms if taken early  Elderberry, Emergen-C, and/or Zinc to reduce duration of viral URI symptoms    Cough:  Robitussin or Delsym as needed  Cough drops  Vicks vapor rub and/or humidifier at night        Rest as much as you can     Your symptoms are likely viral and will typically last 7-10 days, maybe longer depending on how it affects your body.  You are contagious until day 5-7, so minimize contact with others to reduce the spread to others and stay home from work or school as we discussed. Dehydration is preventable but is one of the main reasons why you will feel so badly. Drink pedialyte, gatorade or propel. Stay hydrated.  Antibiotics are not needed unless a complication( such as Otitis Media, Bacterial sinus infection or pneumonia) develops. Taking antibiotics for Flu/Cold is not supported by evidence-based medicine and can expose you to unnecessary side effects of the medication, such as anaphylaxis, yeast infection and leads to antibiotic resistance.     Please follow up with your primary care provider within 5-7 days if your signs and symptoms have not resolved or worsen.  If your condition worsens or fails to improve we recommend that you receive another evaluation at the emergency  room immediately or contact your primary medical clinic to discuss your concerns.  You must understand that you have received an Urgent Care treatment only and that you may be released before all of  your medical problems are known or treated. You, the patient, will arrange for follow up care as instructed.    Go to Emergency Room immediately if you experience any:  Chest pain, shortness of breath, wheezing or difficulty breathing,  Severe headache, face, neck or ear pain,  New rash,  Fever over 101.5º F (38.6 C) for more than three days,  Confusion, behavior change or seizure,  Severe weakness or dizziness or passing out        Medical Decision Making:   Differential Diagnosis:   URI  Clinical Tests:   Lab Tests: Ordered and Reviewed       <> Summary of Lab: Covid and flu neg  Urgent Care Management:  Pt is here today complaining of headaches, nasal congestion, cough, and fever. Pt states her symptoms started yesterday.  Pt was recently exposed to covid by her son who tested positive Monday. Pt has been taking OTC tylenol. Her temp was 101 yesterday.   Physical Exam   HENT:   Head: Normocephalic and atraumatic.   Ears:   Right Ear: Tympanic membrane normal.   Left Ear: Tympanic membrane normal.   Nose: Congestion present. Right sinus exhibits maxillary sinus tenderness. Right sinus exhibits no frontal sinus tenderness. Left sinus exhibits maxillary sinus tenderness. Left sinus exhibits no frontal sinus tenderness.   Mouth/Throat: Mucous membranes are moist. Oropharynx is clear.   Eyes: Conjunctivae are normal. Pupils are equal, round, and reactive to light.   Cardiovascular: Normal rate and regular rhythm.   Pulmonary/Chest: Effort normal and breath sounds normal.   Abdominal: Normal appearance.   Neurological: She is alert.   Psychiatric: Her behavior is normal. Mood normal.   The pt has a a URI. I sent her home with some meds to help her symptoms. ED and return precautions were given. The pt gen.

## 2024-09-23 ENCOUNTER — OFFICE VISIT (OUTPATIENT)
Dept: URGENT CARE | Facility: CLINIC | Age: 54
End: 2024-09-23
Payer: MEDICARE

## 2024-09-23 VITALS
WEIGHT: 140 LBS | HEART RATE: 64 BPM | BODY MASS INDEX: 21.97 KG/M2 | RESPIRATION RATE: 16 BRPM | TEMPERATURE: 98 F | HEIGHT: 67 IN | OXYGEN SATURATION: 99 % | DIASTOLIC BLOOD PRESSURE: 64 MMHG | SYSTOLIC BLOOD PRESSURE: 107 MMHG

## 2024-09-23 DIAGNOSIS — T81.41XA SUPERFICIAL INCISIONAL SURGICAL SITE INFECTION: Primary | ICD-10-CM

## 2024-09-23 DIAGNOSIS — R23.9 REACTION OF SKIN DUE TO SUTURE MATERIAL: ICD-10-CM

## 2024-09-23 DIAGNOSIS — L08.9 SKIN PUSTULE: ICD-10-CM

## 2024-09-23 DIAGNOSIS — S61.011A LACERATION OF SKIN OF RIGHT THUMB, INITIAL ENCOUNTER: ICD-10-CM

## 2024-09-23 DIAGNOSIS — T88.8XXA REACTION OF SKIN DUE TO SUTURE MATERIAL: ICD-10-CM

## 2024-09-23 RX ORDER — DOXYCYCLINE 100 MG/1
100 CAPSULE ORAL 2 TIMES DAILY
Qty: 14 CAPSULE | Refills: 0 | Status: SHIPPED | OUTPATIENT
Start: 2024-09-23 | End: 2024-09-30

## 2024-09-23 RX ORDER — MUPIROCIN 20 MG/G
OINTMENT TOPICAL 3 TIMES DAILY
Qty: 22 G | Refills: 0 | Status: SHIPPED | OUTPATIENT
Start: 2024-09-23 | End: 2024-09-30

## 2024-09-23 RX ORDER — MUPIROCIN 20 MG/G
OINTMENT TOPICAL
Status: COMPLETED | OUTPATIENT
Start: 2024-09-23 | End: 2024-09-23

## 2024-09-23 RX ADMIN — MUPIROCIN: 20 OINTMENT TOPICAL at 02:09

## 2024-09-23 NOTE — PATIENT INSTRUCTIONS
Please follow up with your primary care provider within 2-5 days if your signs and symptoms have not resolved or worsen.     If your condition worsens or fails to improve we recommend that you receive another evaluation at the emergency room immediately or contact your primary medical clinic to discuss your concerns.   You must understand that you have received an Urgent Care treatment only and that you may be released before all of your medical problems are known or treated. You, the patient, will arrange for follow up care as instructed.     You MUST call today to schedule a follow up with Dr Charlton RE: surgical site wound infection.  Please take antibiotics to completion.  Use Mupirocin ointment to neck wound site and cover with bandage.  Go to ER for any worsening of condition.  Follow up with your pcp regarding your chronic headaches.  Your thumb laceration was repaired with skin glue. Do NOT use any ointments/creams on this site!

## 2024-09-23 NOTE — PROGRESS NOTES
"Subjective:      Patient ID: Nancy Sun is a 54 y.o. female.    Vitals:  height is 5' 7" (1.702 m) and weight is 63.5 kg (140 lb). Her oral temperature is 98.3 °F (36.8 °C). Her blood pressure is 107/64 and her pulse is 64. Her respiration is 16 and oxygen saturation is 99%.     Chief Complaint: OTHER    Patient complaints: had a power port implanted by Dr Charlton on  08/29/24 to R chest wall;access site was to right side of neck and she thinks the site is infected. Reports localized swelling and pain    -also cut her left thumb today cutting cabbage; Td utd  -no OTC meds      Other  This is a new problem. Associated symptoms include neck pain. Pertinent negatives include no arthralgias, chest pain, chills, coughing, diaphoresis, fatigue, fever, joint swelling, nausea, numbness or vomiting.       Constitution: Negative for activity change, appetite change, chills, sweating, fatigue and fever.   Neck: Positive for neck pain and neck swelling. Negative for neck stiffness.   Cardiovascular:  Negative for chest pain.   Respiratory:  Negative for cough.    Gastrointestinal:  Negative for nausea, vomiting and diarrhea.   Musculoskeletal:  Positive for pain. Negative for joint pain, joint swelling and abnormal ROM of joint.   Skin:  Positive for laceration and erythema.   Allergic/Immunologic: Positive for immunizations up-to-date.   Neurological:  Negative for disorientation, altered mental status, numbness and tingling.   Psychiatric/Behavioral:  Negative for altered mental status, disorientation and confusion.       Objective:     Physical Exam   Constitutional: She is oriented to person, place, and time.  Non-toxic appearance. She does not appear ill. No distress.   HENT:   Head: Normocephalic and atraumatic.   Mouth/Throat: Mucous membranes are moist.   Neck:          Comments: Localized swelling and tenderness to palpation noted to R anterolateral neck with slight erythema surrounding recent surgical " incision/insertion site.   Also present is a small pustule adjacent to a protruding internal suture.     erythema present.No neck rigidity present. No decreased range of motion present.   Cardiovascular: Normal rate and normal pulses.   Pulmonary/Chest: Effort normal.   Abdominal: Normal appearance.   Musculoskeletal:         General: Tenderness and signs of injury present.      Cervical back: She exhibits tenderness.      Right hand: She exhibits normal capillary refill. Right thumb: Exhibits tenderness. Injuries: laceration. Motor /Testing: The patient has normal right wrist strength. Comments: Superficial laceration noted to distal tip of right thumb without active bleeding.   No sensory or motor deficits.        Left hand: Normal.   Lymphadenopathy:     She has no cervical adenopathy.   Neurological: She is alert and oriented to person, place, and time.   Skin: Skin is warm, dry and not diaphoretic. Capillary refill takes less than 2 seconds. erythema   Psychiatric: Her behavior is normal. Mood, judgment and thought content normal.   Nursing note and vitals reviewed.      Assessment:     1. Superficial incisional surgical site infection    2. Laceration of skin of right thumb, initial encounter    3. Reaction of skin due to suture material    4. Skin pustule        Plan:       Superficial incisional surgical site infection  -     mupirocin 2 % ointment  -     mupirocin (BACTROBAN) 2 % ointment; Apply topically 3 (three) times daily. for 7 days  Dispense: 22 g; Refill: 0  -     doxycycline (VIBRAMYCIN) 100 MG Cap; Take 1 capsule (100 mg total) by mouth 2 (two) times daily. for 7 days  Dispense: 14 capsule; Refill: 0    Laceration of skin of right thumb, initial encounter  -     Physician communication order    Reaction of skin due to suture material    Skin pustule  -     mupirocin 2 % ointment  -     mupirocin (BACTROBAN) 2 % ointment; Apply topically 3 (three) times daily. for 7 days  Dispense: 22 g; Refill:  0  -     doxycycline (VIBRAMYCIN) 100 MG Cap; Take 1 capsule (100 mg total) by mouth 2 (two) times daily. for 7 days  Dispense: 14 capsule; Refill: 0          Medical Decision Making:   Urgent Care Management:  Pt called to schedule f/u with Dr Charlton while in clinic- has appt tomorrow at noon to evaluate post surgical infection vs allergic reaction to internal suture. Mupirocin ointment applied and covered with bandage. Will initiate antibiotic coverage for suspected skin infection.    Thumb wound cleaned and repaired with dermabond.        Patient Instructions   Please follow up with your primary care provider within 2-5 days if your signs and symptoms have not resolved or worsen.     If your condition worsens or fails to improve we recommend that you receive another evaluation at the emergency room immediately or contact your primary medical clinic to discuss your concerns.   You must understand that you have received an Urgent Care treatment only and that you may be released before all of your medical problems are known or treated. You, the patient, will arrange for follow up care as instructed.     You MUST call today to schedule a follow up with Dr Charlton RE: surgical site wound infection.  Please take antibiotics to completion.  Use Mupirocin ointment to neck wound site and cover with bandage.  Go to ER for any worsening of condition.  Follow up with your pcp regarding your chronic headaches.  Your thumb laceration was repaired with skin glue. Do NOT use any ointments/creams on this site!

## 2024-09-27 NOTE — PROCEDURES
Laceration Repair    Date/Time: 9/23/2024 1:00 PM    Performed by: Halima Desouza NP  Authorized by: Halima Desouza NP  Body area: upper extremity  Location details: right thumb  Laceration length: 1 cm  Foreign bodies: no foreign bodies  Tendon involvement: none  Nerve involvement: none  Vascular damage: no    Patient sedated: no  Irrigation solution: tap water  Irrigation method: tap  Amount of cleaning: standard  Debridement: none  Degree of undermining: none  Dressing: dressing applied  Patient tolerance: Patient tolerated the procedure well with no immediate complications  Comments: Laceration repaired with skin adhesive (dermabond)

## 2024-12-23 DIAGNOSIS — R31.9 HEMATURIA: Primary | ICD-10-CM

## 2025-01-08 ENCOUNTER — OFFICE VISIT (OUTPATIENT)
Dept: UROLOGY | Facility: CLINIC | Age: 55
End: 2025-01-08
Payer: MEDICARE

## 2025-01-08 VITALS
HEIGHT: 67 IN | BODY MASS INDEX: 21.97 KG/M2 | SYSTOLIC BLOOD PRESSURE: 126 MMHG | WEIGHT: 140 LBS | DIASTOLIC BLOOD PRESSURE: 63 MMHG | HEART RATE: 58 BPM

## 2025-01-08 DIAGNOSIS — R31.9 HEMATURIA: ICD-10-CM

## 2025-01-08 DIAGNOSIS — R31.0 GROSS HEMATURIA: Primary | ICD-10-CM

## 2025-01-08 DIAGNOSIS — N39.0 FREQUENT URINARY TRACT INFECTIONS: ICD-10-CM

## 2025-01-08 PROCEDURE — 3008F BODY MASS INDEX DOCD: CPT | Mod: CPTII,,, | Performed by: FAMILY MEDICINE

## 2025-01-08 PROCEDURE — 99204 OFFICE O/P NEW MOD 45 MIN: CPT | Mod: S$PBB,,, | Performed by: FAMILY MEDICINE

## 2025-01-08 PROCEDURE — 3074F SYST BP LT 130 MM HG: CPT | Mod: CPTII,,, | Performed by: FAMILY MEDICINE

## 2025-01-08 PROCEDURE — 1159F MED LIST DOCD IN RCRD: CPT | Mod: CPTII,,, | Performed by: FAMILY MEDICINE

## 2025-01-08 PROCEDURE — 3078F DIAST BP <80 MM HG: CPT | Mod: CPTII,,, | Performed by: FAMILY MEDICINE

## 2025-01-08 NOTE — PROGRESS NOTES
Subjective:       Patient ID: Nacny Sun is a 54 y.o. female.    Chief Complaint: Hematuria      HPI: 54-year-old female patient presenting to the clinic to establish care for gross hematuria.  Patient denies being a current or former smoker but she does state that she has had a large amount of secondhand smoke exposure throughout her life.  Patient states she also experiences recurrent UTIs however she has had gross hematuria outside of urinary tract infections.         Past Medical History:   Past Medical History:   Diagnosis Date    Abdominal pain     Arthritis     Clotting disorder     Constipation     COPD (chronic obstructive pulmonary disease)     Generalized headaches     GERD (gastroesophageal reflux disease)     Heart murmur     Hypertension     Sleep disorder     Smoldering myeloma 2022       Past Surgical Historical:   Past Surgical History:   Procedure Laterality Date    APPENDECTOMY  1989     SECTION      CHOLECYSTECTOMY      COLECTOMY  2012    CYST REMOVAL  2018    right shoulder    CYST REMOVAL Left 2020    Under L arm    HYSTERECTOMY  2007    JOINT REPLACEMENT      KNEE JOINT MANIPULATION Left 2018    knee surg Left 2016    knee replacement    PORTACATH PLACEMENT      REVISION OF TOTAL REPLACEMENT OF KNEE USING ROTATING HINGE PROSTHESIS Left 2017    TOTAL KNEE REPLACEMENT USING COMPUTER NAVIGATION Right     TUBAL LIGATION  2007        Medications:   Medication List with Changes/Refills   Current Medications    APIXABAN (ELIQUIS) 5 MG TAB    Take 1 tablet (5 mg total) by mouth 2 (two) times daily.    CARVEDILOL (COREG) 25 MG TABLET    Take 1 tablet (25 mg total) by mouth 2 (two) times daily with meals.    FLUTICASONE PROPIONATE (FLONASE) 50 MCG/ACTUATION NASAL SPRAY    2 sprays (100 mcg total) by Each Nostril route once daily.    METOCLOPRAMIDE HCL (REGLAN) 10 MG TABLET    Take 30 mg by mouth 2 (two) times daily.    NYSTATIN (MYCOSTATIN) POWDER     Apply topically 2 (two) times daily.    OMEPRAZOLE (PRILOSEC) 20 MG CAPSULE    Take 20 mg by mouth once daily.    ONETOUCH ULTRA TEST STRP    USE TEST STRIP WITH GLUCOSE METER TO TEST BLOOD SUGAR TWICE DAILY AS NEEDED.    ONETOUCH ULTRASOFT LANCETS LANCETS    USE ONE LANCET TO TEST BLOOD GLUCOSE TWICE DAILY.    PANTOPRAZOLE (PROTONIX) 40 MG TABLET    Take 1 tablet (40 mg total) by mouth once daily.    RYBELSUS 3 MG TABLET        SPIRONOLACTONE (ALDACTONE) 50 MG TABLET    Take 1 tablet (50 mg total) by mouth 2 (two) times daily.    ZOLPIDEM (AMBIEN) 5 MG TAB    Take 1 tablet (5 mg total) by mouth nightly as needed.        Past Social History:   Social History     Socioeconomic History    Marital status: Single   Occupational History    Occupation: none   Tobacco Use    Smoking status: Never    Smokeless tobacco: Never   Substance and Sexual Activity    Alcohol use: No    Drug use: No    Sexual activity: Never     Social Drivers of Health     Financial Resource Strain: Low Risk  (3/12/2019)    Overall Financial Resource Strain (CARDIA)     Difficulty of Paying Living Expenses: Not hard at all   Food Insecurity: No Food Insecurity (3/12/2019)    Hunger Vital Sign     Worried About Running Out of Food in the Last Year: Never true     Ran Out of Food in the Last Year: Never true   Transportation Needs: No Transportation Needs (3/12/2019)    PRAPARE - Transportation     Lack of Transportation (Medical): No     Lack of Transportation (Non-Medical): No   Physical Activity: Unknown (3/12/2019)    Exercise Vital Sign     Days of Exercise per Week: 2 days   Stress: No Stress Concern Present (3/12/2019)    [employer] - Occupational Stress Questionnaire     Feeling of Stress : Not at all       Allergies:   Review of patient's allergies indicates:   Allergen Reactions    Codeine Hives    Dilaudid [hydromorphone (bulk)] Other (See Comments)     Respiratory distress    Hydrocodone-acetaminophen  Shortness Of Breath    Hydromorphone hcl Shortness Of Breath    Morphine Shortness Of Breath    Hydrocodone Rash    Oxycodone Itching    Diclofenac     Losartan      Coughing - difficulty breathing    Tramadol     Acetaminophen Rash    Ketorolac Rash        Family History: No family history on file.     Review of Systems:  Review of Systems   Constitutional:  Negative for activity change, appetite change, chills, diaphoresis, fatigue, fever and unexpected weight change.   HENT:  Negative for congestion, dental problem, drooling, ear discharge, ear pain, facial swelling, hearing loss, mouth sores, nosebleeds, postnasal drip, rhinorrhea, sinus pressure, sinus pain, sneezing, sore throat, tinnitus, trouble swallowing and voice change.    Eyes:  Negative for photophobia, pain, discharge, redness, itching and visual disturbance.   Respiratory:  Negative for apnea, cough, choking, chest tightness, shortness of breath, wheezing and stridor.    Cardiovascular:  Negative for chest pain and leg swelling.   Gastrointestinal:  Negative for abdominal distention, abdominal pain, anal bleeding, blood in stool, constipation, diarrhea, nausea, rectal pain and vomiting.   Endocrine: Negative for cold intolerance, heat intolerance, polydipsia, polyphagia and polyuria.   Genitourinary: Negative.  Negative for decreased urine volume, difficulty urinating, dyspareunia, dysuria, enuresis, flank pain, frequency, genital sores, hematuria, menstrual problem, pelvic pain, urgency, vaginal bleeding, vaginal discharge and vaginal pain.   Musculoskeletal:  Negative for arthralgias, back pain, gait problem, joint swelling, myalgias, neck pain and neck stiffness.   Skin:  Negative for color change, pallor, rash and wound.   Allergic/Immunologic: Negative for environmental allergies, food allergies and immunocompromised state.   Neurological:  Negative for dizziness, tremors, seizures, syncope, facial asymmetry, speech difficulty, weakness,  light-headedness, numbness and headaches.   Hematological:  Negative for adenopathy. Does not bruise/bleed easily.   Psychiatric/Behavioral:  Negative for agitation, behavioral problems, confusion, decreased concentration, dysphoric mood, hallucinations, self-injury, sleep disturbance and suicidal ideas. The patient is not nervous/anxious and is not hyperactive.        Physical Exam:  Physical Exam  Exam conducted with a chaperone present.   Constitutional:       Appearance: Normal appearance.   HENT:      Head: Normocephalic.      Nose: Nose normal.      Mouth/Throat:      Mouth: Mucous membranes are moist.      Pharynx: Oropharynx is clear.   Eyes:      Extraocular Movements: Extraocular movements intact.      Conjunctiva/sclera: Conjunctivae normal.      Pupils: Pupils are equal, round, and reactive to light.   Cardiovascular:      Rate and Rhythm: Normal rate and regular rhythm.      Pulses: Normal pulses.      Heart sounds: Normal heart sounds.   Pulmonary:      Effort: Pulmonary effort is normal.      Breath sounds: Normal breath sounds.   Abdominal:      General: Abdomen is flat. Bowel sounds are normal.      Palpations: Abdomen is soft.      Hernia: There is no hernia in the left inguinal area or right inguinal area.   Genitourinary:     General: Normal vulva.      Pubic Area: No rash or pubic lice.       Labia:         Right: No rash, tenderness, lesion or injury.         Left: No rash, tenderness, lesion or injury.       Urethra: No prolapse, urethral pain, urethral swelling or urethral lesion.      Rectum: Normal.   Musculoskeletal:         General: Normal range of motion.      Cervical back: Normal range of motion and neck supple.   Lymphadenopathy:      Lower Body: No right inguinal adenopathy. No left inguinal adenopathy.   Skin:     General: Skin is warm and dry.      Capillary Refill: Capillary refill takes less than 2 seconds.   Neurological:      General: No focal deficit present.      Mental  Status: She is alert and oriented to person, place, and time. Mental status is at baseline.   Psychiatric:         Mood and Affect: Mood normal.         Behavior: Behavior normal.         Thought Content: Thought content normal.         Judgment: Judgment normal.         Assessment/Plan:     Gross hematuria:  We will set the patient up for CT urogram and cystoscopy.  Patient denies being a current or former smoker.  Follow up based on hematuria workup results    Recurrent UTI:  We will culture the patient's urine today and treat if indicated.  Offered 90 days of Macrobid 100 mg daily with the patient declined at this time and stating she probably would not take the medication.  Complete urine drop-off with our office if symptoms of infection develop in the future  Problem List Items Addressed This Visit    None  Visit Diagnoses       Gross hematuria    -  Primary    Relevant Orders    CT Urogram Abd Pelvis W WO    Cystoscopy    Hematuria        Relevant Orders    CT Urogram Abd Pelvis W WO    Cystoscopy

## 2025-01-09 LAB
BILIRUBIN, UA POC OHS: NEGATIVE
BLOOD, UA POC OHS: NEGATIVE
CLARITY, UA POC OHS: CLEAR
COLOR, UA POC OHS: YELLOW
GLUCOSE, UA POC OHS: NEGATIVE
KETONES, UA POC OHS: NEGATIVE
LEUKOCYTES, UA POC OHS: ABNORMAL
NITRITE, UA POC OHS: NEGATIVE
PH, UA POC OHS: 6.5
PROTEIN, UA POC OHS: NEGATIVE
SPECIFIC GRAVITY, UA POC OHS: 1.02
UROBILINOGEN, UA POC OHS: 1

## 2025-01-10 ENCOUNTER — TELEPHONE (OUTPATIENT)
Dept: UROLOGY | Facility: CLINIC | Age: 55
End: 2025-01-10
Payer: MEDICARE

## 2025-01-10 DIAGNOSIS — N39.0 FREQUENT URINARY TRACT INFECTIONS: Primary | ICD-10-CM

## 2025-01-10 RX ORDER — SULFAMETHOXAZOLE AND TRIMETHOPRIM 800; 160 MG/1; MG/1
1 TABLET ORAL 2 TIMES DAILY
Qty: 14 TABLET | Refills: 0 | Status: SHIPPED | OUTPATIENT
Start: 2025-01-10

## 2025-01-10 NOTE — TELEPHONE ENCOUNTER
Returned request for call back. Notified patient that urine culture grew out bacteria & that bactrim DS has been sent out to her pharmacy & to complete entire course to treat. 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 Verbalized understanding.                   ----- Message from Hope sent at 1/10/2025  2:14 PM CST -----  Contact: KAYLA ALVAREZ [[id number]]  ..Type:  Patient Requesting Call    Who Called:KAYLA ALVAREZ [[id number]]  What is the call regarding?: returning call  Would the patient rather a call back or a response via [facility]?  call  Best Call Back Number: [phone] (home)   Additional Information:

## 2025-01-10 NOTE — TELEPHONE ENCOUNTER
Attempted to contact in regards of culture results, abx  Bactrim BID 7 days will be prescribed. LMTRC. MARIXAP

## 2025-01-14 ENCOUNTER — TELEPHONE (OUTPATIENT)
Dept: UROLOGY | Facility: CLINIC | Age: 55
End: 2025-01-14
Payer: MEDICARE

## 2025-01-14 NOTE — TELEPHONE ENCOUNTER
Spoke with patient and informed her that CT was negative and she still needs to complete her cystoscopy on 1/23/25.  Pt acknowledges understanding.----- Message from Lilian Long NP sent at 1/13/2025  4:47 PM CST -----  Please notify patient of CT results.  No abnormal findings.  Patient should complete hematuria workup with cystoscopy

## 2025-01-23 ENCOUNTER — PROCEDURE VISIT (OUTPATIENT)
Dept: UROLOGY | Facility: CLINIC | Age: 55
End: 2025-01-23
Payer: MEDICARE

## 2025-01-23 ENCOUNTER — TELEPHONE (OUTPATIENT)
Dept: UROLOGY | Facility: CLINIC | Age: 55
End: 2025-01-23

## 2025-01-23 VITALS
BODY MASS INDEX: 24.51 KG/M2 | DIASTOLIC BLOOD PRESSURE: 71 MMHG | WEIGHT: 156.13 LBS | HEART RATE: 63 BPM | SYSTOLIC BLOOD PRESSURE: 119 MMHG | HEIGHT: 67 IN

## 2025-01-23 DIAGNOSIS — N39.0 FREQUENT URINARY TRACT INFECTIONS: Primary | ICD-10-CM

## 2025-01-23 DIAGNOSIS — R31.0 GROSS HEMATURIA: ICD-10-CM

## 2025-01-23 LAB
BILIRUBIN, UA POC OHS: NEGATIVE
BLOOD, UA POC OHS: NEGATIVE
CLARITY, UA POC OHS: CLEAR
COLOR, UA POC OHS: YELLOW
GLUCOSE, UA POC OHS: NEGATIVE
KETONES, UA POC OHS: NEGATIVE
LEUKOCYTES, UA POC OHS: NEGATIVE
NITRITE, UA POC OHS: NEGATIVE
PH, UA POC OHS: 6
PROTEIN, UA POC OHS: NEGATIVE
SPECIFIC GRAVITY, UA POC OHS: 1.02
UROBILINOGEN, UA POC OHS: 0.2

## 2025-01-23 PROCEDURE — 52000 CYSTOURETHROSCOPY: CPT | Mod: S$PBB,,, | Performed by: UROLOGY

## 2025-01-23 NOTE — PROCEDURES
Cystoscopy    Date/Time: 1/23/2025 2:00 PM    Performed by: Maurice Altamirano MD  Authorized by: Lilian Long NP    Timeout: prior to procedure the correct patient, procedure, and site was verified    Prep: patient was prepped and draped in usual sterile fashion    Anesthesia:  Intraurethral instillation  Indications: hematuria    Position:  Supine  Anesthesia:  Intraurethral instillation  Patient sedated?: No    Preparation: Patient was prepped and draped in usual sterile fashion    Scope type:  Flexible cystoscope  External exam normal: Yes    Urethra normal: Yes    Comments:      The patient was brought to the procedure room placed on the table padded prepped and draped in usual sterile fashion in supine position. The cystoscope was inserted into the urethra and advanced the urethra was normal. The bladder was entered and inspected, it was found to be free of tumor stone or foreign body.  Bilateral ureteral orifices were identified and noted to be normal in appearance with clear efflux of urine at this point the scope was removed the patient tolerated the procedure well there were no complications.  Pelvic exam was performed and no abnormalities were noted.    Impression:   Patient has recurrent UTIs will put her on daily nitrofurantoin if this continues

## 2025-05-22 DIAGNOSIS — R31.0 GROSS HEMATURIA: Primary | ICD-10-CM
